# Patient Record
Sex: MALE | NOT HISPANIC OR LATINO | Employment: OTHER | URBAN - METROPOLITAN AREA
[De-identification: names, ages, dates, MRNs, and addresses within clinical notes are randomized per-mention and may not be internally consistent; named-entity substitution may affect disease eponyms.]

---

## 2017-08-16 ENCOUNTER — TRANSFERRED RECORDS (OUTPATIENT)
Dept: HEALTH INFORMATION MANAGEMENT | Facility: CLINIC | Age: 72
End: 2017-08-16
Payer: COMMERCIAL

## 2018-07-17 ENCOUNTER — RECORDS - HEALTHEAST (OUTPATIENT)
Dept: LAB | Facility: CLINIC | Age: 73
End: 2018-07-17

## 2018-07-17 LAB
ALBUMIN SERPL-MCNC: 4 G/DL (ref 3.5–5)
ALP SERPL-CCNC: 71 U/L (ref 45–120)
ALT SERPL W P-5'-P-CCNC: 36 U/L (ref 0–45)
ANION GAP SERPL CALCULATED.3IONS-SCNC: 11 MMOL/L (ref 5–18)
AST SERPL W P-5'-P-CCNC: 26 U/L (ref 0–40)
BILIRUB SERPL-MCNC: 0.8 MG/DL (ref 0–1)
BUN SERPL-MCNC: 19 MG/DL (ref 8–28)
CALCIUM SERPL-MCNC: 9.4 MG/DL (ref 8.5–10.5)
CHLORIDE BLD-SCNC: 104 MMOL/L (ref 98–107)
CHOLEST SERPL-MCNC: 222 MG/DL
CO2 SERPL-SCNC: 26 MMOL/L (ref 22–31)
CREAT SERPL-MCNC: 0.94 MG/DL (ref 0.7–1.3)
FASTING STATUS PATIENT QL REPORTED: ABNORMAL
GFR SERPL CREATININE-BSD FRML MDRD: >60 ML/MIN/1.73M2
GLUCOSE BLD-MCNC: 103 MG/DL (ref 70–125)
HDLC SERPL-MCNC: 42 MG/DL
LDLC SERPL CALC-MCNC: 137 MG/DL
POTASSIUM BLD-SCNC: 3.7 MMOL/L (ref 3.5–5)
PROT SERPL-MCNC: 6.8 G/DL (ref 6–8)
PSA SERPL-MCNC: 5.6 NG/ML (ref 0–6.5)
SODIUM SERPL-SCNC: 141 MMOL/L (ref 136–145)
TRIGL SERPL-MCNC: 216 MG/DL
TSH SERPL DL<=0.005 MIU/L-ACNC: 1.71 UIU/ML (ref 0.3–5)
VIT B12 SERPL-MCNC: 789 PG/ML (ref 213–816)

## 2018-07-18 LAB — HCV AB SERPL QL IA: NEGATIVE

## 2019-07-18 ENCOUNTER — RECORDS - HEALTHEAST (OUTPATIENT)
Dept: LAB | Facility: CLINIC | Age: 74
End: 2019-07-18

## 2019-07-18 LAB
ALBUMIN SERPL-MCNC: 4.2 G/DL (ref 3.5–5)
ALP SERPL-CCNC: 67 U/L (ref 45–120)
ALT SERPL W P-5'-P-CCNC: 27 U/L (ref 0–45)
ANION GAP SERPL CALCULATED.3IONS-SCNC: 13 MMOL/L (ref 5–18)
AST SERPL W P-5'-P-CCNC: 22 U/L (ref 0–40)
BILIRUB SERPL-MCNC: 0.6 MG/DL (ref 0–1)
BUN SERPL-MCNC: 19 MG/DL (ref 8–28)
CALCIUM SERPL-MCNC: 9.6 MG/DL (ref 8.5–10.5)
CHLORIDE BLD-SCNC: 102 MMOL/L (ref 98–107)
CHOLEST SERPL-MCNC: 238 MG/DL
CO2 SERPL-SCNC: 27 MMOL/L (ref 22–31)
CREAT SERPL-MCNC: 0.98 MG/DL (ref 0.7–1.3)
FASTING STATUS PATIENT QL REPORTED: ABNORMAL
GFR SERPL CREATININE-BSD FRML MDRD: >60 ML/MIN/1.73M2
GLUCOSE BLD-MCNC: 99 MG/DL (ref 70–125)
HDLC SERPL-MCNC: 50 MG/DL
LDLC SERPL CALC-MCNC: 135 MG/DL
POTASSIUM BLD-SCNC: 3.9 MMOL/L (ref 3.5–5)
PROT SERPL-MCNC: 6.9 G/DL (ref 6–8)
PSA SERPL-MCNC: 3.9 NG/ML (ref 0–6.5)
SODIUM SERPL-SCNC: 142 MMOL/L (ref 136–145)
TRIGL SERPL-MCNC: 266 MG/DL

## 2019-12-03 ENCOUNTER — AMBULATORY - HEALTHEAST (OUTPATIENT)
Dept: CARDIOLOGY | Facility: CLINIC | Age: 74
End: 2019-12-03

## 2019-12-03 ENCOUNTER — RECORDS - HEALTHEAST (OUTPATIENT)
Dept: ADMINISTRATIVE | Facility: OTHER | Age: 74
End: 2019-12-03

## 2019-12-04 ENCOUNTER — OFFICE VISIT - HEALTHEAST (OUTPATIENT)
Dept: CARDIOLOGY | Facility: CLINIC | Age: 74
End: 2019-12-04

## 2019-12-04 ENCOUNTER — AMBULATORY - HEALTHEAST (OUTPATIENT)
Dept: CARDIOLOGY | Facility: CLINIC | Age: 74
End: 2019-12-04

## 2019-12-04 DIAGNOSIS — I20.0 ANGINA PECTORIS, CRESCENDO (H): ICD-10-CM

## 2019-12-04 DIAGNOSIS — E78.00 PURE HYPERCHOLESTEROLEMIA: ICD-10-CM

## 2019-12-04 DIAGNOSIS — E66.812 CLASS 2 OBESITY DUE TO EXCESS CALORIES IN ADULT: ICD-10-CM

## 2019-12-04 DIAGNOSIS — I10 ESSENTIAL HYPERTENSION: ICD-10-CM

## 2019-12-04 DIAGNOSIS — E66.09 CLASS 2 OBESITY DUE TO EXCESS CALORIES IN ADULT: ICD-10-CM

## 2019-12-04 RX ORDER — BIOTIN 5 MG
1-2 TABLET ORAL DAILY
Status: SHIPPED | COMMUNITY
Start: 2019-12-04

## 2019-12-04 RX ORDER — NITROGLYCERIN 0.4 MG/1
0.4 TABLET SUBLINGUAL EVERY 5 MIN PRN
Status: SHIPPED | COMMUNITY
Start: 2019-12-04 | End: 2022-05-03

## 2019-12-04 RX ORDER — HYDROCHLOROTHIAZIDE 25 MG/1
25 TABLET ORAL DAILY
Status: SHIPPED | COMMUNITY
Start: 2019-12-04

## 2019-12-04 ASSESSMENT — MIFFLIN-ST. JEOR: SCORE: 1962.35

## 2019-12-05 ENCOUNTER — SURGERY - HEALTHEAST (OUTPATIENT)
Dept: CARDIOLOGY | Facility: CLINIC | Age: 74
End: 2019-12-05

## 2019-12-06 ENCOUNTER — SURGERY - HEALTHEAST (OUTPATIENT)
Dept: CARDIOLOGY | Facility: CLINIC | Age: 74
End: 2019-12-06

## 2019-12-06 ASSESSMENT — MIFFLIN-ST. JEOR: SCORE: 1952.83

## 2019-12-12 ENCOUNTER — COMMUNICATION - HEALTHEAST (OUTPATIENT)
Dept: CARDIOLOGY | Facility: CLINIC | Age: 74
End: 2019-12-12

## 2019-12-19 ENCOUNTER — OFFICE VISIT - HEALTHEAST (OUTPATIENT)
Dept: CARDIOLOGY | Facility: CLINIC | Age: 74
End: 2019-12-19

## 2019-12-19 DIAGNOSIS — I10 ESSENTIAL HYPERTENSION: ICD-10-CM

## 2019-12-19 DIAGNOSIS — E78.00 PURE HYPERCHOLESTEROLEMIA: ICD-10-CM

## 2019-12-19 DIAGNOSIS — I25.10 CORONARY ARTERY DISEASE INVOLVING NATIVE HEART WITHOUT ANGINA PECTORIS, UNSPECIFIED VESSEL OR LESION TYPE: ICD-10-CM

## 2019-12-19 ASSESSMENT — MIFFLIN-ST. JEOR: SCORE: 1975.96

## 2019-12-20 ENCOUNTER — AMBULATORY - HEALTHEAST (OUTPATIENT)
Dept: CARDIAC REHAB | Facility: HOSPITAL | Age: 74
End: 2019-12-20

## 2019-12-20 DIAGNOSIS — I20.0 ANGINA PECTORIS, CRESCENDO (H): ICD-10-CM

## 2019-12-20 DIAGNOSIS — Z95.5 STENTED CORONARY ARTERY: ICD-10-CM

## 2019-12-23 ENCOUNTER — AMBULATORY - HEALTHEAST (OUTPATIENT)
Dept: CARDIAC REHAB | Facility: HOSPITAL | Age: 74
End: 2019-12-23

## 2019-12-23 DIAGNOSIS — Z95.5 STENTED CORONARY ARTERY: ICD-10-CM

## 2019-12-30 ENCOUNTER — AMBULATORY - HEALTHEAST (OUTPATIENT)
Dept: CARDIAC REHAB | Facility: HOSPITAL | Age: 74
End: 2019-12-30

## 2019-12-30 DIAGNOSIS — Z95.5 STENTED CORONARY ARTERY: ICD-10-CM

## 2020-01-06 ENCOUNTER — AMBULATORY - HEALTHEAST (OUTPATIENT)
Dept: CARDIAC REHAB | Facility: HOSPITAL | Age: 75
End: 2020-01-06

## 2020-01-06 DIAGNOSIS — Z95.5 STENTED CORONARY ARTERY: ICD-10-CM

## 2020-01-13 ENCOUNTER — AMBULATORY - HEALTHEAST (OUTPATIENT)
Dept: CARDIAC REHAB | Facility: HOSPITAL | Age: 75
End: 2020-01-13

## 2020-01-13 DIAGNOSIS — Z95.5 STENTED CORONARY ARTERY: ICD-10-CM

## 2020-01-15 ENCOUNTER — AMBULATORY - HEALTHEAST (OUTPATIENT)
Dept: CARDIAC REHAB | Facility: HOSPITAL | Age: 75
End: 2020-01-15

## 2020-01-15 DIAGNOSIS — Z95.5 STENTED CORONARY ARTERY: ICD-10-CM

## 2020-01-20 ENCOUNTER — AMBULATORY - HEALTHEAST (OUTPATIENT)
Dept: CARDIAC REHAB | Facility: HOSPITAL | Age: 75
End: 2020-01-20

## 2020-01-20 DIAGNOSIS — Z95.5 STENTED CORONARY ARTERY: ICD-10-CM

## 2020-01-22 ENCOUNTER — AMBULATORY - HEALTHEAST (OUTPATIENT)
Dept: CARDIAC REHAB | Facility: HOSPITAL | Age: 75
End: 2020-01-22

## 2020-01-22 DIAGNOSIS — Z95.5 STENTED CORONARY ARTERY: ICD-10-CM

## 2020-01-27 ENCOUNTER — COMMUNICATION - HEALTHEAST (OUTPATIENT)
Dept: CARDIOLOGY | Facility: CLINIC | Age: 75
End: 2020-01-27

## 2020-01-27 ENCOUNTER — OFFICE VISIT - HEALTHEAST (OUTPATIENT)
Dept: CARDIOLOGY | Facility: CLINIC | Age: 75
End: 2020-01-27

## 2020-01-27 DIAGNOSIS — E66.812 CLASS 2 OBESITY DUE TO EXCESS CALORIES IN ADULT: ICD-10-CM

## 2020-01-27 DIAGNOSIS — E78.00 PURE HYPERCHOLESTEROLEMIA: ICD-10-CM

## 2020-01-27 DIAGNOSIS — I10 ESSENTIAL HYPERTENSION: ICD-10-CM

## 2020-01-27 DIAGNOSIS — E66.09 CLASS 2 OBESITY DUE TO EXCESS CALORIES IN ADULT: ICD-10-CM

## 2020-01-27 DIAGNOSIS — I25.10 CORONARY ARTERY DISEASE INVOLVING NATIVE HEART WITHOUT ANGINA PECTORIS, UNSPECIFIED VESSEL OR LESION TYPE: ICD-10-CM

## 2020-01-27 ASSESSMENT — MIFFLIN-ST. JEOR: SCORE: 1971.43

## 2020-01-29 ENCOUNTER — AMBULATORY - HEALTHEAST (OUTPATIENT)
Dept: CARDIAC REHAB | Facility: HOSPITAL | Age: 75
End: 2020-01-29

## 2020-01-29 DIAGNOSIS — Z95.5 STENTED CORONARY ARTERY: ICD-10-CM

## 2020-02-03 ENCOUNTER — AMBULATORY - HEALTHEAST (OUTPATIENT)
Dept: CARDIAC REHAB | Facility: HOSPITAL | Age: 75
End: 2020-02-03

## 2020-02-03 DIAGNOSIS — Z95.5 STENTED CORONARY ARTERY: ICD-10-CM

## 2020-02-10 ENCOUNTER — HOSPITAL ENCOUNTER (OUTPATIENT)
Dept: CARDIOLOGY | Facility: HOSPITAL | Age: 75
Discharge: HOME | End: 2020-02-10
Attending: INTERNAL MEDICINE

## 2020-02-10 ENCOUNTER — AMBULATORY - HEALTHEAST (OUTPATIENT)
Dept: LAB | Facility: HOSPITAL | Age: 75
End: 2020-02-10

## 2020-02-10 ENCOUNTER — HOSPITAL ENCOUNTER (OUTPATIENT)
Dept: NUCLEAR MEDICINE | Facility: HOSPITAL | Age: 75
Discharge: HOME | End: 2020-02-10
Attending: INTERNAL MEDICINE

## 2020-02-10 DIAGNOSIS — E78.00 PURE HYPERCHOLESTEROLEMIA: ICD-10-CM

## 2020-02-10 DIAGNOSIS — I25.10 CORONARY ARTERY DISEASE INVOLVING NATIVE HEART WITHOUT ANGINA PECTORIS, UNSPECIFIED VESSEL OR LESION TYPE: ICD-10-CM

## 2020-02-10 LAB
CHOLEST SERPL-MCNC: 204 MG/DL
CV STRESS CURRENT BP HE: NORMAL
CV STRESS CURRENT HR HE: 100
CV STRESS CURRENT HR HE: 111
CV STRESS CURRENT HR HE: 112
CV STRESS CURRENT HR HE: 115
CV STRESS CURRENT HR HE: 117
CV STRESS CURRENT HR HE: 121
CV STRESS CURRENT HR HE: 121
CV STRESS CURRENT HR HE: 123
CV STRESS CURRENT HR HE: 126
CV STRESS CURRENT HR HE: 126
CV STRESS CURRENT HR HE: 73
CV STRESS CURRENT HR HE: 75
CV STRESS CURRENT HR HE: 75
CV STRESS CURRENT HR HE: 77
CV STRESS CURRENT HR HE: 77
CV STRESS CURRENT HR HE: 80
CV STRESS CURRENT HR HE: 81
CV STRESS CURRENT HR HE: 82
CV STRESS CURRENT HR HE: 83
CV STRESS CURRENT HR HE: 86
CV STRESS CURRENT HR HE: 86
CV STRESS DEVIATION TIME HE: NORMAL
CV STRESS ECHO PERCENT HR HE: NORMAL
CV STRESS EXERCISE STAGE HE: NORMAL
CV STRESS EXERCISE STAGE REACHED HE: NORMAL
CV STRESS FINAL RESTING BP HE: NORMAL
CV STRESS FINAL RESTING HR HE: 83
CV STRESS MAX HR HE: 126
CV STRESS MAX TREADMILL GRADE HE: 12
CV STRESS MAX TREADMILL SPEED HE: 2.5
CV STRESS PEAK DIA BP HE: NORMAL
CV STRESS PEAK SYS BP HE: NORMAL
CV STRESS PHASE HE: NORMAL
CV STRESS PROTOCOL HE: NORMAL
CV STRESS RESTING PT POSITION HE: NORMAL
CV STRESS RESTING PT POSITION HE: NORMAL
CV STRESS ST DEVIATION AMOUNT HE: NORMAL
CV STRESS ST DEVIATION ELEVATION HE: NORMAL
CV STRESS ST EVELATION AMOUNT HE: NORMAL
CV STRESS TEST TYPE HE: NORMAL
CV STRESS TOTAL STAGE TIME MIN 1 HE: NORMAL
FASTING STATUS PATIENT QL REPORTED: YES
HDLC SERPL-MCNC: 52 MG/DL
LDLC SERPL CALC-MCNC: 124 MG/DL
NUC STRESS EJECTION FRACTION: 52 %
RATE PRESSURE PRODUCT: NORMAL
STRESS ECHO BASELINE DIASTOLIC HE: 84
STRESS ECHO BASELINE HR: 74
STRESS ECHO BASELINE SYSTOLIC BP: 136
STRESS ECHO CALCULATED PERCENT HR: 85 %
STRESS ECHO LAST STRESS DIASTOLIC BP: 82
STRESS ECHO LAST STRESS HR: 126
STRESS ECHO LAST STRESS SYSTOLIC BP: 168
STRESS ECHO POST ESTIMATED WORKLOAD: 5.8
STRESS ECHO POST EXERCISE DUR MIN: 4
STRESS ECHO POST EXERCISE DUR SEC: 0
STRESS ECHO TARGET HR: 148
TRIGL SERPL-MCNC: 140 MG/DL

## 2020-02-11 ENCOUNTER — COMMUNICATION - HEALTHEAST (OUTPATIENT)
Dept: CARDIOLOGY | Facility: CLINIC | Age: 75
End: 2020-02-11

## 2020-02-12 ENCOUNTER — AMBULATORY - HEALTHEAST (OUTPATIENT)
Dept: CARDIAC REHAB | Facility: HOSPITAL | Age: 75
End: 2020-02-12

## 2020-02-12 ENCOUNTER — COMMUNICATION - HEALTHEAST (OUTPATIENT)
Dept: CARDIOLOGY | Facility: CLINIC | Age: 75
End: 2020-02-12

## 2020-02-12 DIAGNOSIS — Z95.5 STENTED CORONARY ARTERY: ICD-10-CM

## 2020-02-12 DIAGNOSIS — I20.0 ANGINA PECTORIS, CRESCENDO (H): ICD-10-CM

## 2020-02-17 ENCOUNTER — AMBULATORY - HEALTHEAST (OUTPATIENT)
Dept: CARDIAC REHAB | Facility: HOSPITAL | Age: 75
End: 2020-02-17

## 2020-02-17 DIAGNOSIS — Z95.5 STENTED CORONARY ARTERY: ICD-10-CM

## 2020-02-19 ENCOUNTER — AMBULATORY - HEALTHEAST (OUTPATIENT)
Dept: CARDIAC REHAB | Facility: HOSPITAL | Age: 75
End: 2020-02-19

## 2020-02-19 DIAGNOSIS — Z95.5 STENTED CORONARY ARTERY: ICD-10-CM

## 2020-03-02 ENCOUNTER — AMBULATORY - HEALTHEAST (OUTPATIENT)
Dept: CARDIAC REHAB | Facility: HOSPITAL | Age: 75
End: 2020-03-02

## 2020-03-02 DIAGNOSIS — Z95.5 STENTED CORONARY ARTERY: ICD-10-CM

## 2020-03-11 ENCOUNTER — AMBULATORY - HEALTHEAST (OUTPATIENT)
Dept: CARDIAC REHAB | Facility: HOSPITAL | Age: 75
End: 2020-03-11

## 2020-03-11 DIAGNOSIS — Z95.5 STENTED CORONARY ARTERY: ICD-10-CM

## 2020-03-16 ENCOUNTER — AMBULATORY - HEALTHEAST (OUTPATIENT)
Dept: CARDIAC REHAB | Facility: HOSPITAL | Age: 75
End: 2020-03-16

## 2020-03-16 DIAGNOSIS — Z95.5 STENTED CORONARY ARTERY: ICD-10-CM

## 2020-05-05 ENCOUNTER — AMBULATORY - HEALTHEAST (OUTPATIENT)
Dept: CARDIAC REHAB | Facility: HOSPITAL | Age: 75
End: 2020-05-05

## 2020-05-05 DIAGNOSIS — Z95.5 STENTED CORONARY ARTERY: ICD-10-CM

## 2020-05-07 ENCOUNTER — AMBULATORY - HEALTHEAST (OUTPATIENT)
Dept: CARDIAC REHAB | Facility: HOSPITAL | Age: 75
End: 2020-05-07

## 2020-05-07 DIAGNOSIS — Z95.5 STENTED CORONARY ARTERY: ICD-10-CM

## 2020-05-12 ENCOUNTER — AMBULATORY - HEALTHEAST (OUTPATIENT)
Dept: CARDIAC REHAB | Facility: HOSPITAL | Age: 75
End: 2020-05-12

## 2020-05-12 DIAGNOSIS — Z95.5 STENTED CORONARY ARTERY: ICD-10-CM

## 2020-05-14 ENCOUNTER — AMBULATORY - HEALTHEAST (OUTPATIENT)
Dept: CARDIAC REHAB | Facility: HOSPITAL | Age: 75
End: 2020-05-14

## 2020-05-14 DIAGNOSIS — Z95.5 STENTED CORONARY ARTERY: ICD-10-CM

## 2020-05-19 ENCOUNTER — AMBULATORY - HEALTHEAST (OUTPATIENT)
Dept: CARDIAC REHAB | Facility: HOSPITAL | Age: 75
End: 2020-05-19

## 2020-05-19 DIAGNOSIS — Z95.5 STENTED CORONARY ARTERY: ICD-10-CM

## 2020-05-22 ENCOUNTER — COMMUNICATION - HEALTHEAST (OUTPATIENT)
Dept: EMERGENCY MEDICINE | Facility: HOSPITAL | Age: 75
End: 2020-05-22

## 2020-06-09 ENCOUNTER — AMBULATORY - HEALTHEAST (OUTPATIENT)
Dept: CARDIAC REHAB | Facility: HOSPITAL | Age: 75
End: 2020-06-09

## 2020-06-09 DIAGNOSIS — Z95.5 STENTED CORONARY ARTERY: ICD-10-CM

## 2020-06-11 ENCOUNTER — AMBULATORY - HEALTHEAST (OUTPATIENT)
Dept: CARDIAC REHAB | Facility: HOSPITAL | Age: 75
End: 2020-06-11

## 2020-06-11 DIAGNOSIS — Z95.5 STENTED CORONARY ARTERY: ICD-10-CM

## 2020-06-16 ENCOUNTER — AMBULATORY - HEALTHEAST (OUTPATIENT)
Dept: CARDIAC REHAB | Facility: HOSPITAL | Age: 75
End: 2020-06-16

## 2020-06-16 DIAGNOSIS — Z95.5 STENTED CORONARY ARTERY: ICD-10-CM

## 2020-06-18 ENCOUNTER — AMBULATORY - HEALTHEAST (OUTPATIENT)
Dept: CARDIAC REHAB | Facility: HOSPITAL | Age: 75
End: 2020-06-18

## 2020-06-18 DIAGNOSIS — Z95.5 STENTED CORONARY ARTERY: ICD-10-CM

## 2020-06-19 ENCOUNTER — COMMUNICATION - HEALTHEAST (OUTPATIENT)
Dept: CARDIOLOGY | Facility: CLINIC | Age: 75
End: 2020-06-19

## 2020-06-19 DIAGNOSIS — I10 ESSENTIAL HYPERTENSION: ICD-10-CM

## 2020-06-23 ENCOUNTER — AMBULATORY - HEALTHEAST (OUTPATIENT)
Dept: CARDIAC REHAB | Facility: HOSPITAL | Age: 75
End: 2020-06-23

## 2020-06-23 DIAGNOSIS — Z95.5 STENTED CORONARY ARTERY: ICD-10-CM

## 2020-06-25 ENCOUNTER — AMBULATORY - HEALTHEAST (OUTPATIENT)
Dept: CARDIAC REHAB | Facility: HOSPITAL | Age: 75
End: 2020-06-25

## 2020-06-25 DIAGNOSIS — Z95.5 STENTED CORONARY ARTERY: ICD-10-CM

## 2020-06-30 ENCOUNTER — AMBULATORY - HEALTHEAST (OUTPATIENT)
Dept: CARDIAC REHAB | Facility: HOSPITAL | Age: 75
End: 2020-06-30

## 2020-06-30 DIAGNOSIS — Z95.5 STENTED CORONARY ARTERY: ICD-10-CM

## 2020-07-02 ENCOUNTER — AMBULATORY - HEALTHEAST (OUTPATIENT)
Dept: CARDIAC REHAB | Facility: HOSPITAL | Age: 75
End: 2020-07-02

## 2020-07-02 DIAGNOSIS — Z95.5 STENTED CORONARY ARTERY: ICD-10-CM

## 2020-07-07 ENCOUNTER — AMBULATORY - HEALTHEAST (OUTPATIENT)
Dept: CARDIAC REHAB | Facility: HOSPITAL | Age: 75
End: 2020-07-07

## 2020-07-07 ENCOUNTER — AMBULATORY - HEALTHEAST (OUTPATIENT)
Dept: CARDIOLOGY | Facility: CLINIC | Age: 75
End: 2020-07-07

## 2020-07-07 ENCOUNTER — RECORDS - HEALTHEAST (OUTPATIENT)
Dept: ADMINISTRATIVE | Facility: OTHER | Age: 75
End: 2020-07-07

## 2020-07-07 DIAGNOSIS — Z95.5 STENTED CORONARY ARTERY: ICD-10-CM

## 2020-07-09 ENCOUNTER — COMMUNICATION - HEALTHEAST (OUTPATIENT)
Dept: CARDIOLOGY | Facility: CLINIC | Age: 75
End: 2020-07-09

## 2020-07-09 ENCOUNTER — AMBULATORY - HEALTHEAST (OUTPATIENT)
Dept: CARDIAC REHAB | Facility: HOSPITAL | Age: 75
End: 2020-07-09

## 2020-07-09 DIAGNOSIS — Z95.5 STENTED CORONARY ARTERY: ICD-10-CM

## 2020-07-10 ENCOUNTER — OFFICE VISIT - HEALTHEAST (OUTPATIENT)
Dept: CARDIOLOGY | Facility: CLINIC | Age: 75
End: 2020-07-10

## 2020-07-10 DIAGNOSIS — I10 ESSENTIAL HYPERTENSION: ICD-10-CM

## 2020-07-10 DIAGNOSIS — I47.29 PAROXYSMAL VT (H): ICD-10-CM

## 2020-07-10 DIAGNOSIS — E66.812 CLASS 2 OBESITY DUE TO EXCESS CALORIES IN ADULT: ICD-10-CM

## 2020-07-10 DIAGNOSIS — I25.10 CORONARY ARTERY DISEASE INVOLVING NATIVE HEART WITHOUT ANGINA PECTORIS, UNSPECIFIED VESSEL OR LESION TYPE: ICD-10-CM

## 2020-07-10 DIAGNOSIS — E66.09 CLASS 2 OBESITY DUE TO EXCESS CALORIES IN ADULT: ICD-10-CM

## 2020-07-10 DIAGNOSIS — E78.00 PURE HYPERCHOLESTEROLEMIA: ICD-10-CM

## 2020-07-10 DIAGNOSIS — M54.40 BACK PAIN OF LUMBAR REGION WITH SCIATICA: ICD-10-CM

## 2020-07-10 LAB
CHOLEST SERPL-MCNC: 209 MG/DL
FASTING STATUS PATIENT QL REPORTED: YES
HDLC SERPL-MCNC: 45 MG/DL
LDLC SERPL CALC-MCNC: 112 MG/DL
TRIGL SERPL-MCNC: 258 MG/DL

## 2020-07-10 ASSESSMENT — MIFFLIN-ST. JEOR: SCORE: 1982.31

## 2020-07-13 ENCOUNTER — AMBULATORY - HEALTHEAST (OUTPATIENT)
Dept: CARDIOLOGY | Facility: CLINIC | Age: 75
End: 2020-07-13

## 2020-07-13 DIAGNOSIS — E78.00 PURE HYPERCHOLESTEROLEMIA: ICD-10-CM

## 2020-07-14 ENCOUNTER — AMBULATORY - HEALTHEAST (OUTPATIENT)
Dept: CARDIAC REHAB | Facility: HOSPITAL | Age: 75
End: 2020-07-14

## 2020-07-14 DIAGNOSIS — Z95.5 STENTED CORONARY ARTERY: ICD-10-CM

## 2020-07-16 ENCOUNTER — AMBULATORY - HEALTHEAST (OUTPATIENT)
Dept: CARDIAC REHAB | Facility: HOSPITAL | Age: 75
End: 2020-07-16

## 2020-07-16 DIAGNOSIS — Z95.5 STENTED CORONARY ARTERY: ICD-10-CM

## 2020-08-11 ENCOUNTER — RECORDS - HEALTHEAST (OUTPATIENT)
Dept: LAB | Facility: CLINIC | Age: 75
End: 2020-08-11

## 2020-08-11 LAB — PSA SERPL-MCNC: 2.2 NG/ML (ref 0–6.5)

## 2020-12-30 ENCOUNTER — COMMUNICATION - HEALTHEAST (OUTPATIENT)
Dept: CARDIOLOGY | Facility: CLINIC | Age: 75
End: 2020-12-30

## 2020-12-30 DIAGNOSIS — I20.0 ANGINA PECTORIS, CRESCENDO (H): ICD-10-CM

## 2021-01-14 ENCOUNTER — COMMUNICATION - HEALTHEAST (OUTPATIENT)
Dept: CARDIOLOGY | Facility: CLINIC | Age: 76
End: 2021-01-14

## 2021-01-14 DIAGNOSIS — I20.0 ANGINA PECTORIS, CRESCENDO (H): ICD-10-CM

## 2021-01-28 ENCOUNTER — COMMUNICATION - HEALTHEAST (OUTPATIENT)
Dept: CARDIOLOGY | Facility: CLINIC | Age: 76
End: 2021-01-28

## 2021-01-29 ENCOUNTER — AMBULATORY - HEALTHEAST (OUTPATIENT)
Dept: CARDIOLOGY | Facility: CLINIC | Age: 76
End: 2021-01-29

## 2021-01-29 DIAGNOSIS — E78.00 PURE HYPERCHOLESTEROLEMIA: ICD-10-CM

## 2021-01-29 LAB
CHOLEST SERPL-MCNC: 164 MG/DL
FASTING STATUS PATIENT QL REPORTED: YES
HDLC SERPL-MCNC: 38 MG/DL
LDLC SERPL CALC-MCNC: 78 MG/DL
TRIGL SERPL-MCNC: 241 MG/DL

## 2021-02-02 ENCOUNTER — RECORDS - HEALTHEAST (OUTPATIENT)
Dept: ADMINISTRATIVE | Facility: OTHER | Age: 76
End: 2021-02-02

## 2021-02-04 ENCOUNTER — COMMUNICATION - HEALTHEAST (OUTPATIENT)
Dept: CARDIOLOGY | Facility: CLINIC | Age: 76
End: 2021-02-04

## 2021-02-05 ENCOUNTER — OFFICE VISIT - HEALTHEAST (OUTPATIENT)
Dept: CARDIOLOGY | Facility: CLINIC | Age: 76
End: 2021-02-05

## 2021-02-05 DIAGNOSIS — M54.40 BACK PAIN OF LUMBAR REGION WITH SCIATICA: ICD-10-CM

## 2021-02-05 DIAGNOSIS — E66.812 CLASS 2 OBESITY DUE TO EXCESS CALORIES IN ADULT: ICD-10-CM

## 2021-02-05 DIAGNOSIS — I10 ESSENTIAL HYPERTENSION: ICD-10-CM

## 2021-02-05 DIAGNOSIS — E66.09 CLASS 2 OBESITY DUE TO EXCESS CALORIES IN ADULT: ICD-10-CM

## 2021-02-05 DIAGNOSIS — I25.10 CORONARY ARTERY DISEASE INVOLVING NATIVE HEART WITHOUT ANGINA PECTORIS, UNSPECIFIED VESSEL OR LESION TYPE: ICD-10-CM

## 2021-02-05 DIAGNOSIS — E78.00 PURE HYPERCHOLESTEROLEMIA: ICD-10-CM

## 2021-02-05 RX ORDER — CELECOXIB 200 MG/1
1 CAPSULE ORAL DAILY
Status: ON HOLD | COMMUNITY
Start: 2021-01-06 | End: 2021-08-05

## 2021-02-05 RX ORDER — VENLAFAXINE HYDROCHLORIDE 37.5 MG/1
1 CAPSULE, EXTENDED RELEASE ORAL DAILY PRN
Status: SHIPPED | COMMUNITY
Start: 2021-01-06 | End: 2021-08-03

## 2021-02-05 ASSESSMENT — MIFFLIN-ST. JEOR: SCORE: 2012.25

## 2021-02-08 ENCOUNTER — COMMUNICATION - HEALTHEAST (OUTPATIENT)
Dept: CARDIOLOGY | Facility: CLINIC | Age: 76
End: 2021-02-08

## 2021-02-08 DIAGNOSIS — R06.02 SHORTNESS OF BREATH: ICD-10-CM

## 2021-02-18 ENCOUNTER — OFFICE VISIT - HEALTHEAST (OUTPATIENT)
Dept: CARDIOLOGY | Facility: CLINIC | Age: 76
End: 2021-02-18

## 2021-02-18 DIAGNOSIS — I47.29 PAROXYSMAL VT (H): ICD-10-CM

## 2021-02-23 ENCOUNTER — COMMUNICATION - HEALTHEAST (OUTPATIENT)
Dept: CARDIOLOGY | Facility: CLINIC | Age: 76
End: 2021-02-23

## 2021-02-25 ENCOUNTER — OFFICE VISIT - HEALTHEAST (OUTPATIENT)
Dept: CARDIOLOGY | Facility: CLINIC | Age: 76
End: 2021-02-25

## 2021-02-25 DIAGNOSIS — I47.29 PAROXYSMAL VT (H): ICD-10-CM

## 2021-03-02 ENCOUNTER — OFFICE VISIT - HEALTHEAST (OUTPATIENT)
Dept: CARDIOLOGY | Facility: CLINIC | Age: 76
End: 2021-03-02

## 2021-03-02 DIAGNOSIS — I47.29 PAROXYSMAL VT (H): ICD-10-CM

## 2021-03-09 ENCOUNTER — COMMUNICATION - HEALTHEAST (OUTPATIENT)
Dept: CARDIOLOGY | Facility: CLINIC | Age: 76
End: 2021-03-09

## 2021-03-09 DIAGNOSIS — E78.00 PURE HYPERCHOLESTEROLEMIA: ICD-10-CM

## 2021-03-09 DIAGNOSIS — I20.0 ANGINA PECTORIS, CRESCENDO (H): ICD-10-CM

## 2021-03-10 ENCOUNTER — OFFICE VISIT - HEALTHEAST (OUTPATIENT)
Dept: CARDIOLOGY | Facility: CLINIC | Age: 76
End: 2021-03-10

## 2021-03-10 DIAGNOSIS — I47.29 PAROXYSMAL VT (H): ICD-10-CM

## 2021-03-10 RX ORDER — ATORVASTATIN CALCIUM 80 MG/1
TABLET, FILM COATED ORAL
Qty: 90 TABLET | Refills: 3 | Status: SHIPPED | OUTPATIENT
Start: 2021-03-10 | End: 2021-07-22

## 2021-03-10 RX ORDER — EZETIMIBE 10 MG/1
TABLET ORAL
Qty: 90 TABLET | Refills: 3 | Status: SHIPPED | OUTPATIENT
Start: 2021-03-10 | End: 2022-03-02

## 2021-03-23 ENCOUNTER — TRANSFERRED RECORDS (OUTPATIENT)
Dept: HEALTH INFORMATION MANAGEMENT | Facility: CLINIC | Age: 76
End: 2021-03-23

## 2021-04-08 ENCOUNTER — HOSPITAL ENCOUNTER (OUTPATIENT)
Dept: CARDIOLOGY | Facility: HOSPITAL | Age: 76
Discharge: HOME | End: 2021-04-08
Attending: INTERNAL MEDICINE

## 2021-04-08 DIAGNOSIS — R06.02 SHORTNESS OF BREATH: ICD-10-CM

## 2021-04-08 LAB
AORTIC ROOT: 3.7 CM
AORTIC VALVE MEAN VELOCITY: 119 CM/S
ASCENDING AORTA: 4 CM
AV DIMENSIONLESS INDEX VTI: 0.6
AV MEAN GRADIENT: 6 MMHG
AV PEAK GRADIENT: 9.2 MMHG
AV VALVE AREA: 3 CM2
AV VELOCITY RATIO: 0.6
BSA FOR ECHO PROCEDURE: 2.49 M2
CV BLOOD PRESSURE: ABNORMAL MMHG
CV ECHO HEIGHT: 70 IN
CV ECHO WEIGHT: 278 LBS
DOP CALC AO PEAK VEL: 152 CM/S
DOP CALC AO VTI: 32.6 CM
DOP CALC LVOT AREA: 4.91 CM2
DOP CALC LVOT DIAMETER: 2.5 CM
DOP CALC LVOT PEAK VEL: 92.5 CM/S
DOP CALC LVOT STROKE VOLUME: 97.6 CM3
DOP CALCLVOT PEAK VEL VTI: 19.9 CM
ECHO EJECTION FRACTION ESTIMATED: 45 %
EJECTION FRACTION: 40 % (ref 55–75)
FRACTIONAL SHORTENING: 28.6 % (ref 28–44)
INTERVENTRICULAR SEPTUM IN END DIASTOLE: 1.1 CM (ref 0.6–1)
IVS/PW RATIO: 1.1
LA AREA 1: 22.2 CM2
LA AREA 2: 31.5 CM2
LEFT ATRIUM LENGTH: 6.43 CM
LEFT ATRIUM SIZE: 4.8 CM
LEFT ATRIUM VOLUME INDEX: 37.1 ML/M2
LEFT ATRIUM VOLUME: 92.4 ML
LEFT VENTRICLE CARDIAC INDEX: 2.6 L/MIN/M2
LEFT VENTRICLE CARDIAC OUTPUT: 6.5 L/MIN
LEFT VENTRICLE DIASTOLIC VOLUME INDEX: 34.5 CM3/M2 (ref 34–74)
LEFT VENTRICLE DIASTOLIC VOLUME: 86 CM3 (ref 62–150)
LEFT VENTRICLE HEART RATE: 67 BPM
LEFT VENTRICLE MASS INDEX: 114.7 G/M2
LEFT VENTRICLE SYSTOLIC VOLUME INDEX: 20.9 CM3/M2 (ref 11–31)
LEFT VENTRICLE SYSTOLIC VOLUME: 52 CM3 (ref 21–61)
LEFT VENTRICULAR INTERNAL DIMENSION IN DIASTOLE: 6.3 CM (ref 4.2–5.8)
LEFT VENTRICULAR INTERNAL DIMENSION IN SYSTOLE: 4.5 CM (ref 2.5–4)
LEFT VENTRICULAR MASS: 285.7 G
LEFT VENTRICULAR OUTFLOW TRACT MEAN GRADIENT: 2 MMHG
LEFT VENTRICULAR OUTFLOW TRACT MEAN VELOCITY: 70 CM/S
LEFT VENTRICULAR OUTFLOW TRACT PEAK GRADIENT: 3 MMHG
LEFT VENTRICULAR POSTERIOR WALL IN END DIASTOLE: 1 CM (ref 0.6–1)
LV STROKE VOLUME INDEX: 39.2 ML/M2
MITRAL VALVE E/A RATIO: 0.6
MV AVERAGE E/E' RATIO: 9.3 CM/S
MV DECELERATION TIME: 275 MS
MV E'TISSUE VEL-LAT: 4.19 CM/S
MV E'TISSUE VEL-MED: 6.24 CM/S
MV LATERAL E/E' RATIO: 11.6
MV MEDIAL E/E' RATIO: 7.8
MV PEAK A VELOCITY: 86.4 CM/S
MV PEAK E VELOCITY: 48.4 CM/S
NUC REST DIASTOLIC VOLUME INDEX: 4448 LBS
NUC REST SYSTOLIC VOLUME INDEX: 70 IN
TRICUSPID VALVE ANULAR PLANE SYSTOLIC EXCURSION: 1.9 CM

## 2021-04-08 ASSESSMENT — MIFFLIN-ST. JEOR: SCORE: 2012.25

## 2021-04-09 ENCOUNTER — AMBULATORY - HEALTHEAST (OUTPATIENT)
Dept: CARDIOLOGY | Facility: CLINIC | Age: 76
End: 2021-04-09

## 2021-04-09 ENCOUNTER — COMMUNICATION - HEALTHEAST (OUTPATIENT)
Dept: CARDIOLOGY | Facility: CLINIC | Age: 76
End: 2021-04-09

## 2021-04-09 DIAGNOSIS — I10 ESSENTIAL HYPERTENSION: ICD-10-CM

## 2021-05-03 RX ORDER — CARVEDILOL 3.12 MG/1
9.25 TABLET ORAL 2 TIMES DAILY WITH MEALS
Qty: 540 TABLET | Refills: 1 | Status: SHIPPED | OUTPATIENT
Start: 2021-05-03 | End: 2021-10-18

## 2021-06-01 ENCOUNTER — RECORDS - HEALTHEAST (OUTPATIENT)
Dept: ADMINISTRATIVE | Facility: CLINIC | Age: 76
End: 2021-06-01

## 2021-06-04 VITALS — BODY MASS INDEX: 38.6 KG/M2 | WEIGHT: 269 LBS

## 2021-06-04 VITALS — HEIGHT: 70 IN | WEIGHT: 264.9 LBS | BODY MASS INDEX: 37.92 KG/M2

## 2021-06-04 VITALS — WEIGHT: 267 LBS | BODY MASS INDEX: 38.31 KG/M2

## 2021-06-04 VITALS — WEIGHT: 271 LBS | BODY MASS INDEX: 38.88 KG/M2

## 2021-06-04 VITALS
WEIGHT: 271.4 LBS | DIASTOLIC BLOOD PRESSURE: 70 MMHG | HEIGHT: 70 IN | BODY MASS INDEX: 38.85 KG/M2 | RESPIRATION RATE: 16 BRPM | HEART RATE: 64 BPM | SYSTOLIC BLOOD PRESSURE: 130 MMHG

## 2021-06-04 VITALS
BODY MASS INDEX: 38.51 KG/M2 | SYSTOLIC BLOOD PRESSURE: 120 MMHG | DIASTOLIC BLOOD PRESSURE: 84 MMHG | RESPIRATION RATE: 16 BRPM | HEIGHT: 70 IN | WEIGHT: 269 LBS | HEART RATE: 69 BPM

## 2021-06-04 VITALS
WEIGHT: 270 LBS | SYSTOLIC BLOOD PRESSURE: 128 MMHG | RESPIRATION RATE: 16 BRPM | BODY MASS INDEX: 38.65 KG/M2 | HEART RATE: 68 BPM | HEIGHT: 70 IN | DIASTOLIC BLOOD PRESSURE: 84 MMHG

## 2021-06-04 VITALS
WEIGHT: 267 LBS | HEART RATE: 59 BPM | BODY MASS INDEX: 38.22 KG/M2 | SYSTOLIC BLOOD PRESSURE: 130 MMHG | RESPIRATION RATE: 12 BRPM | HEIGHT: 70 IN | DIASTOLIC BLOOD PRESSURE: 82 MMHG

## 2021-06-04 VITALS — BODY MASS INDEX: 38.45 KG/M2 | WEIGHT: 268 LBS

## 2021-06-04 VITALS — WEIGHT: 268 LBS | BODY MASS INDEX: 38.45 KG/M2

## 2021-06-04 VITALS — BODY MASS INDEX: 38.31 KG/M2 | WEIGHT: 267 LBS

## 2021-06-04 VITALS — BODY MASS INDEX: 38.74 KG/M2 | WEIGHT: 270 LBS

## 2021-06-04 VITALS — WEIGHT: 265 LBS | BODY MASS INDEX: 38.02 KG/M2

## 2021-06-04 VITALS — BODY MASS INDEX: 37.74 KG/M2 | WEIGHT: 263 LBS

## 2021-06-04 VITALS — WEIGHT: 269 LBS | BODY MASS INDEX: 38.6 KG/M2

## 2021-06-04 VITALS — WEIGHT: 266 LBS | BODY MASS INDEX: 38.17 KG/M2

## 2021-06-04 VITALS — BODY MASS INDEX: 37.59 KG/M2 | WEIGHT: 262 LBS

## 2021-06-04 VITALS — BODY MASS INDEX: 38.88 KG/M2 | WEIGHT: 271 LBS

## 2021-06-04 VITALS — WEIGHT: 270 LBS | BODY MASS INDEX: 38.74 KG/M2

## 2021-06-04 VITALS — BODY MASS INDEX: 38.17 KG/M2 | WEIGHT: 266 LBS

## 2021-06-04 NOTE — PROGRESS NOTES
Cardiac Rehab  Phase II Assessment    Assessment Date: 12-20-19    Diagnosis: CAD    Procedure: CARLOS EDUARDO to LAD  Date of Onset: 12-6-19  ICD/Pacemaker: No   Post-op Complications: na  ECG History: SR with 1 AVB and PVC's EF%:50-55%  Past Medical History:   Patient Active Problem List   Diagnosis     Essential hypertension     Pure hypercholesterolemia     Class 2 obesity due to excess calories in adult     CAD (coronary artery disease)     Past Medical History:   Diagnosis Date     Angina pectoris (H)     per h&p     Hypertension     per H&P     Pure hypercholesterolemia     per H&P     Past Surgical History:   Procedure Laterality Date     CV CORONARY ANGIOGRAM N/A 12/6/2019    Procedure: Coronary Angiogram;  Surgeon: Andria Morrison MD;  Location: [facility];  Service: Cardiology     CV LEFT HEART CATHETERIZATION WITH LEFT VENTRICULOGRAM N/A 12/6/2019    Procedure: Left Heart Catheterization with Left Ventriculogram;  Surgeon: Andria Morrison MD;  Location: [facility];  Service: Cardiology         Physical Assessment  Precautions/ Physical Limitations: Back pain and left leg pain   Oxygen: No  O2 Sats: 95-97% Lung Sounds: Clear Edema: none  Incisions: na  Sleeping Pattern: fair   Appetite: good   Nutrition Risk Screen: no risk at this time    Pain  Location: none  Intensity: (0-10 scale) 0    Psychosocial/ Emotional Health  1. In the past 12 months, have you been in a relationship where you have been abused physically, emotionally, sexually or financially? No  notified: NA  2. Who do you turn to for emotional support?: Wife/ Family  3. Do you have cultural or spiritual needs? No  4. Have there been any major life changes in the past 12 months? Yes - Cousin and Nephew- sudden death    Referral Information  Primary Physician: Bryson Worthington MD  Cardiologist: Jesus  Surgeon: alberta    Home exercise/Equipment: Air dyne/ Gym membership    Patient's long-term goal(s): Home exercise  program    1. Living Accommodations: Home Steps: Yes      Support people at home: Wife   2. Marital Status:   3. Family is able to assist with cares      P[employer]/Community involvement: yes  4. Recreation/Hobbies: Travel, Family        See Doc Flowsheet

## 2021-06-04 NOTE — PROGRESS NOTES
ITP ASSESSMENT   Assessment Day: Initial    Session Number: 1-2  Precautions: Standard    Diagnosis: Stent    Risk Stratification: High    Referring Provider: Andria Morrison MD  EXERCISE  Exercise Assessment: Initial       6 Minute Walk Test   Pre   Pre Exercise HR: 64                    Pre Exercise BP: 126/85      Peak  Peak HR: 101                   Peak BP: 143/93    Peak feet: 1200    Peak O2 SAT: 95    Peak RPE: 5    Peak MPH: 2.27      Symptoms:  Peak Symptoms: Back and left leg pain      5 mins. Post  5 Min Post HR: 68    5 Min Post BP: 145/92                           Exercise Plan  Goals Next 30 days   STG-- Pt will tolerate 40 min of exercise in CR at met levels 2.7-3.2 without sx's.  Pt will establish a home exercise program for 20-25 mins, 3-4 x week. Pt will continue to tolerate PT work as a .  LTG-- Pt will tolerate a exercise program at his gym- 5-6 x week for 40-45 mins at 5.5-6 met level.       Education Goals: All goals in this section met    Education Goals Met: Patient can state cardiac s/s and appropriate emergency response.;Has system for taking medication.;Medication review.      Exercise Prescription  Exercise Mode: Treadmill;Bike;Nustep;Arm Erg.;Hallway Walking    Frequency:  2-3 x week    Duration: 30-40 min    Intensity / THR: 20-30 beats above resting heart rate    RPE 11-14  Progression / Met level: 2.7-3.2    Resistive Training?: Yes      Current Exercise (mins/week): 5      Interventions  Home Exercise:  Mode: Walk/ Bike    Frequency: 3-4 x week    Duration: 20-25 min      Education Material : Educational videos;Provide written material;Individual education and counseling;Offer educational classes      Education Completed  Exercise Education Completed: Cardiac Anatomy;Signs and Symptoms;Medication review;RPE;Emergency Plan;Home Exercise;Warm up/cool down;FITT Principles;BP/HR Reponse to exercise;Benefits of Exercise;End point of exercise              Exercise  "Follow-up/Discharge  Follow up/Discharge: Skilled therapy needed to monitor for CV sx's and cardiac rhythm with increasing workloads, pt will learn to exercise within restrictions of back and left leg pain. Provide education and support for risk factor management.    NUTRITION  Nutrition Assessment: Initial      Nutrition Risk Factors:  Nutrition Risk Factors: Dyslipidemia;Overweight  Cholesterol: 243  LDL: 166  HDL: 47  Triglycerides: 149      Nutrition Plan  Interventions  Other Nutrition Intervention: Diet Class;Therapist/Pt Discussion;Educational Videos;Provide with Written Material      Education Completed  Nutrition Education Completed: Risk factor overview      Goals  Nutrition Goals (Next 30 days): Patient will follow a low sodium diet;Patient will follow a low saturated fat diet;Patient knows appropriate portion size;Patient will lose weight      Goals Met  Nutrition Goals Met: Completed Nutritional Risk Screen;Provided Rate your Plate Survey;Reviewed Dietitian schedule      Height, Weight, and  BMI  Weight: 266 lb (120.7 kg)  Height: 5' 10\" (1.778 m)  BMI: 38.17      Nutrition Follow-up  Follow-up/Discharge: Enc to return diet survey and consult with the dietitian         Other Risk Factors  Other Risk Factor Assessment: Initial      HTN Risk Factor: Hypertension      Pre Exercise BP: 126/85  Post Exercise BP: 104/62      Hypertension Plan  Goals  HTN Goals: Follow low sodium diet;Take medication as prescribed;Exercises regularly      Goals Met  HTN Goals Met: Take medication as prescribed      HTN Interventions  HTN Interventions: Diet consult;Therapist/patient discussion;Provide written material;Offer educational videos;Offer educational classes      HTN Education Completed  HTN Education Completed: Medication review;Risk factor overview      Tobacco Risk Factor: NA        Risk Factor Follow-up   Follow-up/Discharge: Provide education and support for risk factor management     PSYCHOSOCIAL  Psychosocial " Assessment: Initial       [facility] Q of L Summary Score: 26      PHQ-9 Total Score: 8      Psychosocial Risk Factor: Stress      Psychosocial Plan  Interventions    Interventions: Offer educational videos and classes;Provide written material;Individual education and counseling       Education Completed  Education Completed: Relaxation/Coping Techniques;S/S of depression;Effects of stress on body      Goals  Goals (Next 30 days): Identify stressors;Practicing stress management skills;Improvement in [facility] score      Goals Met  Goals Met: Identified Support system;Oriented to stress management classes      Psychosocial Follow-up  Follow-up/Discharge: Reviewed stress as a risk factor       Patient involved in Goal setting?: Yes      Signature: _____________________________________________________________    Date: __________________    Time: __________________See Doc Flowsheet

## 2021-06-04 NOTE — PATIENT INSTRUCTIONS - HE
Medication     o Take all your medications as prescribed  o Do not stop any medications without talking with a healthcare provider    Exercise      o Physical activity is important for overall health  o Set a goal of 150 minutes of exercise each week  o For example, 30 minutes of exercise 5 days each week.    o These 30 minutes can be broken into shorter periods of 15 minutes twice daily or 10 minutes three times daily  o Start any exercise program slowly and work towards the goal of 150 minutes each week  o For example, you may start with 10 minutes and plan to add a few minutes each week as you get stronger   o Examples of exercise include walking, swimming, or biking  o Remember to stretch and stay hydrated with exercise    Diet     o A heart healthy diet includes:  o A variety of fruits and vegetables  o Whole grains  o Low-fat dairy (fat-free, 1% fat, and low-fat)  o Lean meats and poultry without skin   o Fish (eat fish 2 times each week)  o Nuts  o Limit saturated fat to about 13 grams each day (based on a 2000 calorie diet)  o Limit red meat  o Limit sugars (sweets and sugary beverages)  o Limit your portion sizes  o Do not add salt to your food when cooking or at the table  o Limit alcohol intake (no more than 1 drink each day for women or 2 drinks each day for men)    Weight Loss     o Work on losing weight with diet and exercise  o You BMI (body mass index) should be between 18.5-24.9  o This is a calculation of your weight and height  o Please ask your healthcare provider for your BMI    Manage Other Chronic Health Conditions     o Control cholesterol  o Eat a diet low in saturated fat  o Exercise   o Take a statin medication as prescribed  o Manage blood pressure  o Eat a diet low in sodium  o Exercise  o Reduce stress  o Lose weight   o Take blood pressure medications as prescribed  o Control blood sugars if diabetic  o Monitor sugars and carbohydrates in your diet  o Lose weight   o Take diabetes  medications as prescribed  o Follow-up with your primary care provider to make sure your blood sugars are well controlled    Stress Reduction     o Find time each day to relax  o Reading, listening to music, yoga, meditation, exercise, spending time with friends and family, volunteering   o Get 6-8 hours of sleep each night    Smoking Cessation     o Smoking causes numerous health problems including coronary artery disease  o It is never too late to quit  o Set realistic goals for quitting  o Decrease the number of cigarettes used each week  o Use nicotine gum or patches to help you quit    Information from the [employer] Association.  Please visit their website at www.heart.org

## 2021-06-04 NOTE — TELEPHONE ENCOUNTER
From: Andria Lee MD  Sent: 12/12/2019  10:19 AM CST  To: Viridiana Figueroa RN  This 75-year-old gentleman just got recent stents due to accelerated angina.  He is now on dual antiplatelet therapy.  If he wants to try Naprosyn once a day with food we can do that, I am little worried about bleeding probability however.        Called pt to discuss. Shared with him Dr. Lee's recommendations. Pt aware of the increased risk of bleeding with taking Aleve. He will take sparingly and only as needed. Confirmed FU plans with Clare next Wednesday. Encouraged pt to bring questions or concerns up during OV.

## 2021-06-04 NOTE — TELEPHONE ENCOUNTER
----- Message from Beverly Chopra sent at 12/12/2019  9:49 AM CST -----  Regarding: LBF pt  Caller: Cem  Primary cardiologist: Dr. Lee  Detailed reason for call: Cem states he has knee issues and wants to take Naproxen 200mg in a 12 hour release, one in the a.m. and one in the p.m. for this knee pain. Has recent stents. Please call back.   Best phone number: [phone]  Best time to contact: Today  Ok to leave a detailed message? Yes  Device? No        Dr. Lee, is there any issue with pt taking Naproxen 200 mg 12 hour release two times a day for knee pain? Pt had recent stents placed 12/6.

## 2021-06-05 VITALS — WEIGHT: 278 LBS | BODY MASS INDEX: 39.8 KG/M2 | HEIGHT: 70 IN

## 2021-06-05 VITALS
RESPIRATION RATE: 14 BRPM | WEIGHT: 278 LBS | SYSTOLIC BLOOD PRESSURE: 124 MMHG | OXYGEN SATURATION: 91 % | DIASTOLIC BLOOD PRESSURE: 76 MMHG | HEIGHT: 70 IN | HEART RATE: 63 BPM | BODY MASS INDEX: 39.8 KG/M2

## 2021-06-05 NOTE — PATIENT INSTRUCTIONS - HE
Mr Priest EBONY Kevin,  I enjoyed visiting with you again today.  I am glad to hear you are doing well.  Per our conversation let us check the stress test and the lipids in about 2 weeks.  I will plan on seeing you 3-4 months or sooner if needed.  Suresh Lee

## 2021-06-05 NOTE — TELEPHONE ENCOUNTER
LM for patient to call us back as he forgot his medications in the exam room. Medication bag was left at the .

## 2021-06-05 NOTE — PROGRESS NOTES
Cem EBONY Almontedonald has participated in 9 sessions of Phase II Cardiac Rehab. To see Dr. Lee on 1/27/20.    Progress Report:   Cardiac Rehab Treatment Progress Report 1/6/2020 1/13/2020 1/15/2020 1/20/2020 1/22/2020   Weight 270 lbs 271 lbs 271 lbs 267 lbs 270 lbs   Pre Exercise  HR 74 91 81 71 72   Pre Exercise /64 158/86 124/68 124/70 130/78   Nustep Peak Heart Rate 103 100 108 106 100   Nustep Peak Blood Pressure 156/80 158/80 138/76 136/80 150/86   Heart Rate 77 79 83 72 800   Post Exercise /78 128/70 120/60 110/74 130/68   ECG SR/1 AVB/PVC SR, occ-freq PAC's, occ PVC's with exercise, resolved with slightly decreased workloads SR occ PAC's SR/PVC SR;  Occ. PAC's and PVC's   Total Exercise Minutes 45 40 40 40 40         Current Status:  Currently exercising without complaints or symptoms. Reports chest discomfort and arm discomfort at home, did not use NTG. States sx resolve with rest. Pt has been instructed to report sx to MD.    If Physician recommends change in treatment plan, please place orders.        __________________________________________________      _____________  Signature                                                                                                  Date

## 2021-06-05 NOTE — PROGRESS NOTES
ITP ASSESSMENT   Assessment Day: 30 Day    Session Number: 7  Precautions: Standard    Diagnosis: Stent    Risk Stratification: High    Referring Provider: Bryson Worthington MD  EXERCISE  Exercise Assessment: Reassessment                           Exercise Plan  Goals Next 30 days  ADL'S: STG-- Pt will tolerate 40 min of exercise in CR at met levels 3-3.5 without sx's.  Pt will establish a home exercise program for 20-25 mins, 3-4 x week. Pt will continue to tolerate PT work as a .  LTG-- Pt will tolerate a exercise program at his gym- 5-6 x week for 40-45 mins at 5.5-6 met level.     Education Goals: All goals in this section met    Education Goals Met: Patient can state cardiac s/s and appropriate emergency response.;Has system for taking medication.;Medication review.                          Goals Met  Initial ADL's goals met: STG met: Tolerates 40 min of exercise at 3 METS with no sx's.      Initial Leisure goals met: STG not yet met:  Reports he has not established a home exercise plan, has not yet RTW part time.    Initial Progression: Tolerates 40 min of exercise at 2.8-3 METS with no sx's.  Knee pain limits his exercise tolerance.  Encouraged him to use his [employer] bike at home for regular exercise.      Exercise Prescription  Exercise Mode: Bike;Nustep;Arm Erg.;Stairs    Frequency: 2-3x/week    Duration: 40 min    Intensity / THR: 20-30 beats above resting heart rate    RPE 11-14  Progression / Met level: 3-4    Resistive Training?: Yes      Current Exercise (mins/week): 100      Interventions  Home Exercise:  Mode: Bike/Walk    Frequency: 3-4x/week    Duration: 20-30 min      Education Material : Educational videos;Provide written material;Individual education and counseling;Offer educational classes      Education Completed  Exercise Education Completed: Cardiac Anatomy;Signs and Symptoms;Medication review;RPE;Emergency Plan;Home Exercise;Warm up/cool down;FITT Principles;BP/HR Reponse to  "exercise;Benefits of Exercise;End point of exercise              Exercise Follow-up/Discharge  Follow up/Discharge: Skilled therapy needed to monitor for CV sx's and cardiac rhythm, (increased PVC's and PAC's with exercise occasionally,)  with increasing workloads, pt will learn to exercise within restrictions of back and left leg pain. Provide education and support for risk factor management.            NUTRITION  Nutrition Assessment: Reassessment      Nutrition Risk Factors:  Nutrition Risk Factors: Dyslipidemia;Overweight  Cholesterol: 243  LDL: 166  HDL: 47  Triglycerides: 149      Nutrition Plan  Interventions  No data recorded  Other Nutrition Intervention: Diet Class;Therapist/Pt Discussion;Educational Videos;Provide with Written Material      Education Completed  Nutrition Education Completed: Risk factor overview      Goals  Nutrition Goals (Next 30 days): Patient will lose weight;Patient knows appropriate portion size;Patient will follow a low saturated fat diet;Patient will follow a low sodium diet      Goals Met  Nutrition Goals Met: Completed Nutritional Risk Screen;Provided Rate your Plate Survey;Reviewed Dietitian schedule      Height, Weight, and  BMI  Weight: 271 lb (122.9 kg)  Height: 5' 10\" (1.778 m)  BMI: 38.88      Nutrition Follow-up  Follow-up/Discharge: Enc to return diet survey and consult with the dietitian       Other Risk Factors  Other Risk Factor Assessment: Reassessment      HTN Risk Factor: Hypertension      Pre Exercise BP: 124/68  Post Exercise BP: 128/70      Hypertension Plan  Goals  HTN Goals: Follow low sodium diet;Exercises regularly      Goals Met  HTN Goals Met: Take medication as prescribed      HTN Interventions  HTN Interventions: Diet consult;Therapist/patient discussion;Provide written material;Offer educational videos;Offer educational classes      HTN Education Completed  HTN Education Completed: Medication review;Risk factor overview      Tobacco Risk Factor: " NA    Risk Factor Follow-up   Follow-up/Discharge: BP's have been occasionally elevated.  Will continue to monitor.         PSYCHOSOCIAL  Psychosocial Assessment: Reassessment       [facility] INGRID Q of L Summary Score: 26      PHQ-9 Total Score: 8      Psychosocial Risk Factor: Stress      Psychosocial Plan  Interventions  Interventions: Offer educational videos and classes;Provide written material;Individual education and counseling      Education Completed  Education Completed: Relaxation/Coping Techniques;S/S of depression;Effects of stress on body      Goals  Goals (Next 30 days): Practicing stress management skills      Goals Met  Goals Met: Identified Support system;Oriented to stress management classes      Psychosocial Follow-up  Follow-up/Discharge: Offered class on Effects of Stress on Your Heart.           Patient involved in Goal setting?: Yes      Signature: _____________________________________________________________    Date: __________________    Time: __________________See Doc Flowsheet

## 2021-06-06 NOTE — PROGRESS NOTES
ITP ASSESSMENT   Assessment Day: 60 Day    Session Number: 12  Precautions: Standard    Diagnosis: Stent    Risk Stratification: High    Referring Provider: Bryson Worthington MD   ITPs sent to Dr. Lee  EXERCISE  Exercise Assessment: Reassessment                           Exercise Plan  Goals Next 30 days  ADL'S: STG-- Pt will tolerate 40 min of exercise in CR at met levels 3-3.5 without sx's. Pt will continue to tolerate PT work as a .  LTG-- Pt will tolerate a exercise program at his gym- 5-6 x week for 40-45 mins at 5.5-6 met level.     Education Goals: All goals in this section met    Education Goals Met: Patient can state cardiac s/s and appropriate emergency response.;Has system for taking medication.;Medication review.                          Goals Met  30 day ADL'S goals met: Goal met: Pt exercises 30-40 minutes 2x/week at health club     30 day Leisure goals met: Goal partially met: Pt is able to tolerate 3 METs for 40 minutes     30 Day Progression: Pt has reached 3 METs on Nustep. Continues to progress well.       Initial ADL's goals met: STG met: Tolerates 40 min of exercise at 3 METS with no sx's.      Initial Leisure goals met: STG not yet met:  Reports he has not established a home exercise plan, has not yet RTW part time.    Initial Progression: Tolerates 40 min of exercise at 2.8-3 METS with no sx's.  Knee pain limits his exercise tolerance.  Encouraged him to use his [employer] bike at home for regular exercise.      Exercise Prescription  Exercise Mode: Bike;Nustep;Arm Erg.;Stairs    Frequency: 2-3x/week    Duration: 40 min    Intensity / THR: 20-30 beats above resting heart rate    RPE 11-14  Progression / Met level: 3-4    Resistive Training?: Yes      Current Exercise (mins/week): 130      Interventions  Home Exercise:  Mode: Bike/Walk    Frequency: 3-4x/week    Duration: 30-40 min      Education Material : Educational videos;Provide written material;Individual education and  "counseling;Offer educational classes      Education Completed  Exercise Education Completed: Cardiac Anatomy;Signs and Symptoms;Medication review;RPE;Emergency Plan;Home Exercise;Warm up/cool down;FITT Principles;BP/HR Reponse to exercise;Benefits of Exercise;End point of exercise              Exercise Follow-up/Discharge  Follow up/Discharge: Skilled therapy needed to monitor for CV sx's and cardiac rhythm, (increased PVC's and PAC's with exercise occasionally,)  with increasing workloads, pt will learn to exercise within restrictions of back and left leg pain. Provide education and support for risk factor management.    NUTRITION  Nutrition Assessment: Reassessment      Nutrition Risk Factors:  Nutrition Risk Factors: Dyslipidemia;Overweight  Cholesterol: 204 (2/10/2020)  LDL: 124  HDL: 52  Triglycerides: 140      Nutrition Plan  Interventions  Diet Consult: Completed    Other Nutrition Intervention: Therapist/Pt Discussion;Provide with Written Material    Initial Rate Your Plate Score: 44    Education Completed  Nutrition Education Completed: Low Saturated fat diet;Low sodium diet      Goals  Nutrition Goals (Next 30 days): Patient will follow a low saturated fat diet;Patient knows appropriate portion size      Goals Met  Nutrition Goals Met: Completed Nutritional Risk Screen;Provided Rate your Plate Survey;Reviewed Dietitian schedule      Height, Weight, and  BMI  Weight: 265 lb (120.2 kg)  Height: 5' 10\" (1.778 m)  BMI: 38.02      Nutrition Follow-up  Follow-up/Discharge: Encouraged pt to ask RD questions as needed         Other Risk Factors  Other Risk Factor Assessment: Reassessment      HTN Risk Factor: Hypertension      Pre Exercise BP: 122/74  Post Exercise BP: 116/66      Hypertension Plan  Goals  HTN Goals: Follow low sodium diet;Exercises regularly      Goals Met  HTN Goals Met: Take medication as prescribed      HTN Interventions  HTN Interventions: Diet consult;Therapist/patient discussion;Provide " written material;Offer educational videos;Offer educational classes      HTN Education Completed  HTN Education Completed: Medication review;Risk factor overview      Tobacco Risk Factor: NA      Risk Factor Follow-up   Follow-up/Discharge: BP's have been occasionally elevated.  Will continue to monitor.     PSYCHOSOCIAL  Psychosocial Assessment: Reassessment       [facility] INGRID Q of L Summary Score: 26      PHQ-9 Total Score: 8      Psychosocial Risk Factor: Stress      Psychosocial Plan  Interventions  Interventions: Offer educational videos and classes;Provide written material;Individual education and counseling      Education Completed  Education Completed: Relaxation/Coping Techniques;S/S of depression;Effects of stress on body      Goals  Goals (Next 30 days): Practicing stress management skills      Goals Met  Goals Met: Identified Support system;Oriented to stress management classes      Psychosocial Follow-up  Follow-up/Discharge: Offered class on Effects of Stress on Your Heart.             Patient involved in Goal setting?: Yes      Signature: _____________________________________________________________    Date: __________________    Time: __________________

## 2021-06-06 NOTE — PROGRESS NOTES
ITP ASSESSMENT   Assessment Day: 90 Day    Session Number: 16  Precautions: Standard    Diagnosis: Stent    Risk Stratification: High    Referring Provider: Bryson Worthington MD   ITP:Dr. Lee  EXERCISE  Exercise Assessment: Reassessment                          Exercise Plan  Goals Next 30 days  ADL'S: STG:Pt will tolerate 40 min exercise in CR at 3.3-4MET level and start light weight training.    Leisure: LTG:Pt will exercise at his gym 2-3x/week for 40-45 min, gradually increasing to 5-5-6 MET level    Education Goals: All goals in this section met    Education Goals Met: Patient can state cardiac s/s and appropriate emergency response.;Has system for taking medication.;Medication review.                          Goals Met  60 day ADL'S goals met: STG partially met, pt is tolerating 40 min of eercise at 3-3.3MET level.    60 day Leisure goals met: LTG not met, pt has been inconsistent with going to gym for exercise, states he is too busy.    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    60 Day Progression: Pt has progressed to 3-3.3MET level on the nustep.  Pt refuses treadmill due to knee pain.      30 day ADL'S goals met: Goal met: Pt exercises 30-40 minutes 2x/week at health club     30 day Leisure goals met: Goal partially met: Pt is able to tolerate 3 METs for 40 minutes     30 Day Progression: Pt has reached 3 METs on Nustep. Continues to progress well.       Initial ADL's goals met: STG met: Tolerates 40 min of exercise at 3 METS with no sx's.      Initial Leisure goals met: STG not yet met:  Reports he has not established a home exercise plan, has not yet RTW part time.    Initial Progression: Tolerates 40 min of exercise at 2.8-3 METS with no sx's.  Knee pain limits his exercise tolerance.  Encouraged him to use his [employer] bike at home for regular exercise.      Exercise Prescription  Exercise Mode: Nustep    Frequency: 2x/week    Duration: 40 min    Intensity / THR:  "20-30 beats above resting heart rate    RPE 11-14  Progression / Met level: 3.3-4.5    Resistive Training?: Yes      Current Exercise (mins/week): 190      Interventions  Home Exercise:  Mode: bike, walk    Frequency: 3-4x/week    Duration: 30-40 min      Education Material : Educational videos;Provide written material;Individual education and counseling;Offer educational classes      Education Completed  Exercise Education Completed: Cardiac Anatomy;Signs and Symptoms;Medication review;RPE;Emergency Plan;Home Exercise;Warm up/cool down;FITT Principles;BP/HR Reponse to exercise;Benefits of Exercise;End point of exercise              Exercise Follow-up/Discharge  Follow up/Discharge: Skilled therapy continues to be  needed to monitor for CV sx's and cardiac rhythm, (increased PVC's and PAC's with exercise occasionally,)  with increasing workloads, pt will learn to exercise within restrictions of back and left leg pain. Provide education and support for risk factor management.    NUTRITION  Nutrition Assessment: Reassessment      Nutrition Risk Factors:  Nutrition Risk Factors: Dyslipidemia;Overweight  Cholesterol: 204  LDL: 124  HDL: 52  Triglycerides: 140      Nutrition Plan  Interventions  Diet Consult: Completed    Other Nutrition Intervention: Therapist/Pt Discussion;Provide with Written Material    Initial Rate Your Plate Score: 44      Education Completed  Nutrition Education Completed: Low Saturated fat diet;Low sodium diet      Goals  Nutrition Goals (Next 30 days): Patient will follow a low sodium diet;Patient will follow a low saturated fat diet;Patient knows appropriate portion size      Goals Met  Nutrition Goals Met: Completed Nutritional Risk Screen;Provided Rate your Plate Survey;Reviewed Dietitian schedule;Patient follows a low sodium diet;Patient has lost weight;Patient states following a low saturated fat diet      Height, Weight, and  BMI  Weight: 265 lb (120.2 kg)  Height: 5' 10\" (1.778 m)  BMI: " 38.02      Nutrition Follow-up  Follow-up/Discharge: Pt reports he is doing better with low sodium and low fat, encouraged to continue improving his heart heallthy diet         Other Risk Factors  Other Risk Factor Assessment: Reassessment      HTN Risk Factor: Hypertension      Pre Exercise BP: 126/70  Post Exercise BP: 112/66      Hypertension Plan  Goals  HTN Goals: Exercises regularly      Goals Met  HTN Goals Met: Follow low sodium diet;Take medication as prescribed      HTN Interventions  HTN Interventions: Diet consult;Therapist/patient discussion;Provide written material;Offer educational videos;Offer educational classes      HTN Education Completed  HTN Education Completed: Medication review;Risk factor overview      Tobacco Risk Factor: NA      Risk Factor Follow-up   Follow-up/Discharge: Pt encouraged to be more consistent with home exercise.     PSYCHOSOCIAL  Psychosocial Assessment: Reassessment       [place] COOP Q of L Summary Score: 26      PHQ-9 Total Score: 8      Psychosocial Risk Factor: Stress      Psychosocial Plan  Interventions  Interventions: Offer educational videos and classes;Provide written material;Individual education and counseling      Education Completed  Education Completed: Relaxation/Coping Techniques;S/S of depression;Effects of stress on body      Goals  Goals (Next 30 days): Improvement in [place] COOP score      Goals Met  Goals Met: Identified Support system;Oriented to stress management classes;Identify stressors;Practicing stress management skills      Psychosocial Follow-up  Follow-up/Discharge: Pt reports he likes to read for relaxation             Patient involved in Goal setting?: Yes      Signature: _____________________________________________________________    Date: __________________    Time: __________________

## 2021-06-07 NOTE — PROGRESS NOTES
ITP ASSESSMENT   Assessment Day: 120 Day    Session Number: 17  Precautions: Standard    Diagnosis: Stent    Risk Stratification: High    Referring Provider: Bryson Worthington MD  EXERCISE  Exercise Assessment: Reassessment    Tolerated 40' of exercise at 3.3 Mets,  Pt is on hold secondary to Corona-Virus                            Exercise Plan  Goals Next 30 days   STG:Pt will tolerate 40 min exercise in CR at 3.3-4MET level and start light weight training.     LTG:Pt will exercise at his gym 2-3x/week for 40-45 min, gradually increasing to 5-5-6 MET level      Education Goals: All goals in this section met    Education Goals Met: Patient can state cardiac s/s and appropriate emergency response.;Has system for taking medication.;Medication review.                          Goals Met  90 day ADL'S goals met: Will l evaluate goals when pt resumes        60 day ADL'S goals met: STG partially met, pt is tolerating 40 min of eercise at 3-3.3MET level.    60 day Leisure goals met: LTG not met, pt has been inconsistent with going to gym for exercise, states he is too busy.    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    60 Day Progression: Pt has progressed to 3-3.3MET level on the nustep.  Pt refuses treadmill due to knee pain.      30 day ADL'S goals met: Goal met: Pt exercises 30-40 minutes 2x/week at health club     30 day Leisure goals met: Goal partially met: Pt is able to tolerate 3 METs for 40 minutes   30 Day Progression: Pt has reached 3 METs on Nustep. Continues to progress well.       Initial ADL's goals met: STG met: Tolerates 40 min of exercise at 3 METS with no sx's.      Initial Leisure goals met: STG not yet met:  Reports he has not established a home exercise plan, has not yet RTW part time.    Initial Progression: Tolerates 40 min of exercise at 2.8-3 METS with no sx's.  Knee pain limits his exercise tolerance.  Encouraged him to use his [employer] bike at home for regular  exercise.      Exercise Prescription  Exercise Mode: Nustep    Frequency: 2x/week    Duration: 40 min    Intensity / THR: 20-30 beats above resting heart rate    RPE 11-14  Progression / Met level: 3.3-4.5    Resistive Training?: Yes      Current Exercise (mins/week): 190      Interventions  Home Exercise:  Mode: bike, walk    Frequency: 3-4x/week    Duration: 3-4x/week      Education Material : Educational videos;Provide written material;Individual education and counseling;Offer educational classes      Education Completed  Exercise Education Completed: Cardiac Anatomy;Signs and Symptoms;Medication review;RPE;Emergency Plan;Home Exercise;Warm up/cool down;FITT Principles;BP/HR Reponse to exercise;Benefits of Exercise;End point of exercise              Exercise Follow-up/Discharge  Follow up/Discharge: Skilled therapy continues to be  needed to monitor for CV sx's and cardiac rhythm, (increased PVC's and PAC's with exercise occasionally,)  with increasing workloads, pt will learn to exercise within restrictions of back and left leg pain. Provide education and support for risk factor management.    NUTRITION  Nutrition Assessment: Reassessment      Nutrition Risk Factors:  Nutrition Risk Factors: Dyslipidemia;Overweight  Cholesterol: 204  LDL: 124  HDL: 52  Triglycerides: 140      Nutrition Plan  Interventions  Diet Consult: Completed    Other Nutrition Intervention: Therapist/Pt Discussion;Provide with Written Material    Initial Rate Your Plate Score: 44      Education Completed  Nutrition Education Completed: Low Saturated fat diet;Low sodium diet      Goals  Nutrition Goals (Next 30 days): Patient will follow a low sodium diet;Patient will follow a low saturated fat diet;Patient knows appropriate portion size      Goals Met  Nutrition Goals Met: Completed Nutritional Risk Screen;Provided Rate your Plate Survey;Reviewed Dietitian schedule;Patient follows a low sodium diet;Patient has lost weight;Patient states  "following a low saturated fat diet      Height, Weight, and  BMI  Weight: 262 lb (118.8 kg)  Height: 5' 10\" (1.778 m)  BMI: 37.59      Nutrition Follow-up  Follow-up/Discharge: Pt reports he is doing better with low sodium and low fat, encouraged to continue improving his heart heallthy diet         Other Risk Factors  Other Risk Factor Assessment: Reassessment      HTN Risk Factor: Hypertension      Pre Exercise BP: 118/66  Post Exercise BP: 110/68      Hypertension Plan  Goals  HTN Goals: Exercises regularly      Goals Met  HTN Goals Met: Follow low sodium diet;Take medication as prescribed      HTN Interventions  HTN Interventions: Diet consult;Therapist/patient discussion;Provide written material;Offer educational videos;Offer educational classes      HTN Education Completed  HTN Education Completed: Medication review;Risk factor overview      Tobacco Risk Factor: NA        Risk Factor Follow-up   Follow-up/Discharge: Nilesh review Risk factor education when pt resumes     PSYCHOSOCIAL  Psychosocial Assessment: Reassessment       [facility]: 26      PHQ-9 Total Score: 8      Psychosocial Risk Factor: Stress      Psychosocial Plan  Interventions  Interventions: Offer educational videos and classes;Provide written material;Individual education and counseling      Education Completed  Education Completed: Relaxation/Coping Techniques;S/S of depression;Effects of stress on body      Goals  Goals (Next 30 days): Improvement in [place] CO[employer] score      Goals Met  Goals Met: Identified Support system;Oriented to stress management classes;Identify stressors;Practicing stress management skills      Psychosocial Follow-up  Follow-up/Discharge: Will review Stress/ Relaxation when pt resumes   (W)             Patient involved in Goal setting?: No (ON hold secondary to COVID -19)      Signature: _____________________________________________________________    Date: __________________    Time: " __________________

## 2021-06-07 NOTE — PROGRESS NOTES
"ITP ASSESSMENT   Assessment Day: 150 Day    Session Number: 18  Precautions: Standard    Diagnosis: Stent    Risk Stratification: High    Referring Provider: Bryson Worthington MD  EXERCISE  Exercise Assessment: Reassessment    Resumes after being on hold due to Covid 19 precautions.  States \"I'm back at [facility] [address]\"  So needs to continue CR.                           Exercise Plan  Goals Next 30 days  ADL'S: STG:Pt will tolerate 40 min exercise in CR at 3.3-4MET level and start light weight training.    Leisure: STG: Lose 1-2 lbs /month with daily exercise and portion-controlled, cardiac diet    Education Goals: All goals in this section met    Education Goals Met: Patient can state cardiac s/s and appropriate emergency response.;Has system for taking medication.;Medication review.                          Goals Met        120 day ADL'S goals met: Goal met:  Tolerates 40 min of exercise at 3.2-3.4 METS,    120 Day Progress: SOB continues to limit his progress.  Will advance workloads as tolerates      90 day ADL'S goals met: Will l evaluate goals when pt resumes    60 day ADL'S goals met: STG partially met, pt is tolerating 40 min of eercise at 3-3.3MET level.    60 day Leisure goals met: LTG not met, pt has been inconsistent with going to gym for exercise, states he is too busy.    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    60 Day Progression: Pt has progressed to 3-3.3MET level on the nustep.  Pt refuses treadmill due to knee pain.      30 day ADL'S goals met: Goal met: Pt exercises 30-40 minutes 2x/week at health club     30 day Leisure goals met: Goal partially met: Pt is able to tolerate 3 METs for 40 minutes     No data recorded  30 Day Progression: Pt has reached 3 METs on Nustep. Continues to progress well.       Exercise Prescription  Exercise Mode: Nustep;Arm Erg.;Stairs    Frequency: 2x/week    Duration: 40 min    Intensity / THR: 20-30 beats above resting heart rate    RPE " "11-14  Progression / Met level: 3.4-4    Resistive Training?: Yes      Current Exercise (mins/week): 60      Interventions  Home Exercise:  Mode: Bike, walk    Frequency: 3-4x/week    Duration: 30-40 min      Education Material : Educational videos;Provide written material;Individual education and counseling;Offer educational classes      Education Completed  Exercise Education Completed: Cardiac Anatomy;Signs and Symptoms;Medication review;RPE;Emergency Plan;Home Exercise;Warm up/cool down;FITT Principles;BP/HR Reponse to exercise;Benefits of Exercise;End point of exercise;Stretching;Strength training              Exercise Follow-up/Discharge  Follow up/Discharge: Skilled therapy continues to be  needed to monitor for CV sx's and cardiac rhythm, (increased PVC's and PAC's with exercise occasionally,)  with increasing workloads, pt will learn to exercise within restrictions of back and left leg pain. Provide education and support for risk factor management.    NUTRITION  Nutrition Assessment: Reassessment      Nutrition Risk Factors:  Nutrition Risk Factors: Dyslipidemia;Overweight      Nutrition Plan  Interventions  Diet Consult: Completed    Other Nutrition Intervention: Therapist/Pt Discussion;Provide with Written Material    Initial Rate Your Plate Score: 44      Education Completed  Nutrition Education Completed: Low Saturated fat diet;Low sodium diet      Goals  Nutrition Goals (Next 30 days): Patient will follow a low sodium diet;Patient will follow a low saturated fat diet;Patient knows appropriate portion size;Patient will lose weight      Goals Met  Nutrition Goals Met: Completed Nutritional Risk Screen;Provided Rate your Plate Survey;Reviewed Dietitian schedule;Patient follows a low sodium diet;Patient has lost weight;Patient states following a low saturated fat diet      Height, Weight, and  BMI  Weight: 269 lb (122 kg)  Height: 5' 10\" (1.778 m)  BMI: 38.6    Pre BMI: 38.6     Nutrition " "Follow-up  Follow-up/Discharge: Has gained 7 lbs since being on hold due to Covid 19 precautions.  Wants to get back \"on track\" and improve his health.         Other Risk Factors  Other Risk Factor Assessment: Reassessment      HTN Risk Factor: Hypertension      Pre Exercise BP: 128/66  Post Exercise BP: 112/68      Hypertension Plan  Goals  HTN Goals: Exercises regularly      Goals Met  HTN Goals Met: Follow low sodium diet;Take medication as prescribed      HTN Interventions  HTN Interventions: Diet consult;Therapist/patient discussion;Provide written material;Offer educational videos;Offer educational classes      HTN Education Completed  HTN Education Completed: Medication review;Risk factor overview      Tobacco Risk Factor: NA    Risk Factor Follow-up   Follow-up/Discharge: BP's are occ elevated, will continue to monitor this.     PSYCHOSOCIAL  Psychosocial Assessment: Reassessment      Psychosocial Risk Factor: Stress      Psychosocial Plan  Interventions  If PHQ-9 is >9, send letter to MD  Interventions: Offer educational videos and classes;Provide written material;Individual education and counseling      Education Completed  Education Completed: Relaxation/Coping Techniques;S/S of depression;Effects of stress on body      Goals  Goals (Next 30 days): Practicing stress management skills      Goals Met  Goals Met: Identified Support system;Oriented to stress management classes;Identify stressors;Practicing stress management skills      Psychosocial Follow-up  Follow-up/Discharge: Continues to work on stress management, health concerns are stressful to him.             Patient involved in Goal setting?: Yes      Signature: _____________________________________________________________    Date: __________________    Time: __________________See Doc Flowsheet  "

## 2021-06-08 NOTE — PROGRESS NOTES
ITP ASSESSMENT   Assessment Day: 180 Day    Session Number: 0    Diagnosis: Stent    Risk Stratification: High    Referring Provider: Bryson Worthington MD  EXERCISE  Exercise Assessment: Reassessment    Continues to feel limited by shortness of breath with exercise.  Tolerates 40 min at 3.5 METS.                         Exercise Plan  Goals Next 30 days  ADL'S: STG:Pt will tolerate 40 min exercise in CR at 3.3-4MET level and start light weight training.    Leisure: STG: Lose 1-2 lbs /month with daily exercise and portion-controlled, cardiac diet    Education Goals: All goals in this section met    Education Goals Met: Patient can state cardiac s/s and appropriate emergency response.;Has system for taking medication.;Medication review.                          Goals Met  150+ day ADL'S goals met: Goal partially met: Tolerates 40 min of exercise at 3.5 METS  but is limited by SOB   150+ day Leisure goals met: Goal not met:  No weight loss this past month      150+ Day Progress: Slow progress as Pt reports he continues to be limited by SOB with activity/exercise.  Has missed several sessions and was encouraged to report sx's to MD.       150+ day ADL'S goals met: Goal partially met: Tolerates 40 min of exercise at 3.5 METS  but is limited by SOB   150+ day Leisure goals met: Goal not met:  No weight loss this past month      150+ Day Progress: Slow progress as Pt reports he continues to be limited by SOB with activity/exercise.  Has missed several sessions and was encouraged to report sx's to MD.       120 day ADL'S goals met: Goal met:  Tolerates 40 min of exercise at 3.2-3.4 METS,    120 Day Progress: SOB continues to limit his progress.  Will advance workloads as tolerates      90 day ADL'S goals met: Will l evaluate goals when pt resumes    60 day ADL'S goals met: STG partially met, pt is tolerating 40 min of eercise at 3-3.3MET level.    60 day Leisure goals met: LTG not met, pt has been inconsistent with going  to gym for exercise, states he is too busy.    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    60 Day Progression: Pt has progressed to 3-3.3MET level on the nustep.  Pt refuses treadmill due to knee pain.      30 day ADL'S goals met: Goal met: Pt exercises 30-40 minutes 2x/week at health club     30 day Leisure goals met: Goal partially met: Pt is able to tolerate 3 METs for 40 minutes     30 Day Progression: Pt has reached 3 METs on Nustep. Continues to progress well.       Exercise Prescription  Exercise Mode: Nustep;Arm Erg.;Stairs    Frequency: 2x/week    Duration: 40 min    Intensity / THR: 20-30 beats above resting heart rate    RPE 11-14  Progression / Met level: 3.5-4    Resistive Training?: Yes      Current Exercise (mins/week): 60      Interventions  Home Exercise:  Mode: Bike, walk    Frequency: 3-4x/week    Duration: 30-40 min      Education Material : Educational videos;Provide written material;Individual education and counseling;Offer educational classes      Education Completed  Exercise Education Completed: Cardiac Anatomy;Signs and Symptoms;Medication review;RPE;Emergency Plan;Home Exercise;Warm up/cool down;FITT Principles;BP/HR Reponse to exercise;Benefits of Exercise;End point of exercise;Stretching;Strength training              Exercise Follow-up/Discharge  Follow up/Discharge: Skilled therapy continues to be  needed to monitor for CV sx's and cardiac rhythm, (increased PVC's and PAC's with exercise occasionally,)  with increasing workloads, pt will learn to exercise within restrictions of back and left leg pain. Provide education and support for risk factor management.    NUTRITION  Nutrition Assessment: Reassessment      Nutrition Risk Factors:  Nutrition Risk Factors: Dyslipidemia;Overweight      Nutrition Plan  Interventions  Diet Consult: Completed    Other Nutrition Intervention: Therapist/Pt Discussion;Provide with Written Material    Initial Rate Your Plate  "Score: 44    Pre       Education Completed  Nutrition Education Completed: Low Saturated fat diet;Low sodium diet      Goals  Nutrition Goals (Next 30 days): Patient will lose weight;Patient will follow a low sodium diet;Patient will follow a low saturated fat diet;Patient knows appropriate portion size      Goals Met  Nutrition Goals Met: Completed Nutritional Risk Screen;Provided Rate your Plate Survey;Reviewed Dietitian schedule;Patient follows a low sodium diet;Patient has lost weight;Patient states following a low saturated fat diet      Height, Weight, and  BMI  Weight: 269 lb (122 kg)  Height: 5' 10\" (1.778 m)  BMI: 38.6    Pre BMI: 38.6     Nutrition Follow-up  Follow-up/Discharge: No weight loss (or gain) this month.  Will continue to provide support and education for losing wt to manage risk factors.         Other Risk Factors  Other Risk Factor Assessment: Reassessment      HTN Risk Factor: Hypertension      Pre Exercise BP: 114/68  Post Exercise BP: 116/66      Hypertension Plan  Goals  HTN Goals: Exercises regularly      Goals Met  HTN Goals Met: Follow low sodium diet;Take medication as prescribed      HTN Interventions  HTN Interventions: Diet consult;Therapist/patient discussion;Provide written material;Offer educational videos;Offer educational classes      HTN Education Completed  HTN Education Completed: Medication review;Risk factor overview;Low sodium diet      Tobacco Risk Factor: NA      Risk Factor Follow-up   Follow-up/Discharge: BP's are controlled.     PSYCHOSOCIAL  Psychosocial Assessment: Reassessment       Pre       Pre       Psychosocial Risk Factor: Stress      Psychosocial Plan  Interventions  If PHQ-9 is >9, send letter to MD  Interventions: Offer educational videos and classes;Provide written material;Individual education and counseling      Education Completed  Education Completed: Relaxation/Coping Techniques;S/S of depression;Effects of stress on body      Goals  Goals (Next 30 " days): Practicing stress management skills      Goals Met  Goals Met: Identified Support system;Oriented to stress management classes;Identify stressors;Practicing stress management skills      Psychosocial Follow-up  Follow-up/Discharge: Continues to work on stress management, health concerns are stressful to him.             Patient involved in Goal setting?: No (Goals to be reviewed when Pt. returns to CR)      Signature: _____________________________________________________________    Date: __________________    Time: __________________See Doc Flowsheet

## 2021-06-09 NOTE — TELEPHONE ENCOUNTER
----- Message from Andria Lee MD sent at 6/18/2020  1:35 PM CDT -----  Regarding: FW: Cardiac Rehab Arrhythmia  Can you please contact patient and let him know cardiac rehab contacted me and I reviewed chart.Does have what appear to be PACs with a Baron SANABRIADoubt this is PVC triplets.In any event, would benefit from beta-blocker therapy, would like to start metoprolol tartrate 25 mg p.o. twice daily and have him cut his amlodipine in half from 10 to 5 mg a day.  Have him monitor blood pressures and let us know if they start running a mock.LF  ----- Message -----  From: Salina Alvarado EPS  Sent: 6/18/2020  10:15 AM CDT  To: Andria Lee MD  Subject: Cardiac Rehab Arrhythmia                         Good Morning,   Your patient was here for his 26th session of Cardiac Rehab. At the end of his session he had a couple PVC triplets with one episode followed by SVT with HR 187bpm. Pt was asymptomatic but states he did feel like he was working at the end. 7 Lead EKG is saved in the media folder. Thanks.            Called patient to update on recommendations from [facility]BF. Went over rhythm strips. He said that he was really pushing himself as hard as he could go on that day at rehab. He is comfortable with the medication changes but would like to give it another day at rehab to see if it occurs again. He [facility]. He will cut his Amlodipine in half once he starts the Metoprolol. He plans on making some life style changes and losing some weight. He will call after his cardiac rehab on Tuesday and see how his rhythm strips went there. Orders placed. -[facility] [place]

## 2021-06-09 NOTE — PROGRESS NOTES
Cem Kevin has participated in 32 sessions of Phase II Cardiac Rehab.    Progress Report:   Cardiac Rehab Treatment Progress Report 6/25/2020 6/30/2020 7/2/2020 7/7/2020 7/9/2020   Weight 268 lbs 268 lbs 269 lbs 271 lbs 269 lbs   Pre Exercise  HR 64 56 59 72 73   Pre Exercise /68 122/72 118/64 130/70 104/60   Treadmill Peak HR - 111 105 - -   Nustep Peak Heart Rate 102 95 90 92 104   Nustep Peak Blood Pressure 158/70 158/84 142/66 138/74 136/66   Heart Rate 67 65 68 72 72   Post Exercise /66 106/70 112/64 130/76 110/64   ECG SR/ST with PAC, PVC SB/SR/ST with PACs/PVCs SB/SR/ST with PVC SR/BBB, PAC's SR/BBB, PVC/PACs   Total Exercise Minutes 40 45 40 45 40         Current Status:  Currently exercising without complaints or symptoms.    If Physician recommends change in treatment plan, please place orders.        __________________________________________________      _____________  Signature                                                                                                  Date

## 2021-06-09 NOTE — PROGRESS NOTES
ITP ASSESSMENT   Assessment Day: 210 Day    Session Number: 29  Precautions: Standard    Diagnosis: Stent    Risk Stratification: High    Referring Provider: Bryson Worthington MD  EXERCISE  Exercise Assessment: Reassessment                           Exercise Plan  Goals Next 30 days   STG:Pt will tolerate 40 min exercise in CR at 3.5-4 MET level and start light weight training.    STG: Lose 1-2 lbs /month with daily exercise and portion-controlled, cardiac diet    STG: Tolerate 10-15 min on TM each session      Education Goals: All goals in this section met    Education Goals Met: Patient can state cardiac s/s and appropriate emergency response.;Has system for taking medication.;Medication review.                          Goals Met  150+ day ADL'S goals met: Pt has reached 3.1 to 3.3 METs   150+ day Leisure goals met: Pts wt remains the same      150+ Day Progress: Pt is progressing slowly, pt is motivated to increase tolerance of TM over the next 30 days     150+ day ADL'S goals met: Pt has reached 3.1 to 3.3 METs   150+ day Leisure goals met: Pts wt remains the same      150+ Day Progress: Pt is progressing slowly, pt is motivated to increase tolerance of TM over the next 30 days     120 day ADL'S goals met: Goal met:  Tolerates 40 min of exercise at 3.2-3.4 METS,    120 Day Progress: SOB continues to limit his progress.  Will advance workloads as tolerates      90 day ADL'S goals met: Will l evaluate goals when pt resumes      60 day ADL'S goals met: STG partially met, pt is tolerating 40 min of eercise at 3-3.3MET level.    60 day Leisure goals met: LTG not met, pt has been inconsistent with going to gym for exercise, states he is too busy.    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    60 Day Progression: Pt has progressed to 3-3.3MET level on the nustep.  Pt refuses treadmill due to knee pain.      30 day ADL'S goals met: Goal met: Pt exercises 30-40 minutes 2x/week at health  club     30 day Leisure goals met: Goal partially met: Pt is able to tolerate 3 METs for 40 minutes     30 Day Progression: Pt has reached 3 METs on Nustep. Continues to progress well.       Exercise Prescription  Exercise Mode: Nustep;Arm Erg.;Stairs    Frequency: 2x/week    Duration: 40 min    Intensity / THR: 20-30 beats above resting heart rate    RPE 11-14  Progression / Met level: 3.5-4    Resistive Training?: Yes      Current Exercise (mins/week): 80      Interventions  Home Exercise:  Mode: Bike/walk    Frequency: 3-5x/week    Duration: 30-40 min      Education Material : Educational videos;Provide written material;Individual education and counseling;Offer educational classes      Education Completed  Exercise Education Completed: Cardiac Anatomy;Signs and Symptoms;Medication review;RPE;Emergency Plan;Home Exercise;Warm up/cool down;FITT Principles;BP/HR Reponse to exercise;Benefits of Exercise;End point of exercise;Stretching;Strength training              Exercise Follow-up/Discharge  Follow up/Discharge: Skilled therapy continues to be  needed to monitor for CV sx's and cardiac rhythm, (increased PVC's and PAC's with exercise occasionally,)  with increasing workloads, pt will learn to exercise within restrictions of back and left leg pain. Provide education and support for risk factor management.    NUTRITION  Nutrition Assessment: Reassessment      Nutrition Risk Factors:  Nutrition Risk Factors: Dyslipidemia;Overweight  Cholesterol: 204  LDL: 124  HDL: 52  Triglycerides: 140      Nutrition Plan  Interventions  Diet Consult: Completed    Other Nutrition Intervention: Therapist/Pt Discussion;Provide with Written Material    Initial Rate Your Plate Score: 44    Education Completed  Nutrition Education Completed: Low Saturated fat diet;Low sodium diet      Goals  Nutrition Goals (Next 30 days): Patient will lose weight;Patient will follow a low sodium diet;Patient will follow a low saturated fat  "diet;Patient knows appropriate portion size      Goals Met  Nutrition Goals Met: Completed Nutritional Risk Screen;Provided Rate your Plate Survey;Reviewed Dietitian schedule;Patient follows a low sodium diet;Patient has lost weight;Patient states following a low saturated fat diet      Height, Weight, and  BMI  Weight: 268 lb (121.6 kg)  Height: 5' 10\" (1.778 m)  BMI: 38.45      Nutrition Follow-up  Follow-up/Discharge: No weight loss (or gain) this month.  Will continue to provide support and education for losing wt to manage risk factors.         Other Risk Factors  Other Risk Factor Assessment: Reassessment      HTN Risk Factor: Hypertension      Pre Exercise BP: 122/72  Post Exercise BP: 118/66      Hypertension Plan  Goals  HTN Goals: Exercises regularly      Goals Met  HTN Goals Met: Follow low sodium diet;Take medication as prescribed      HTN Interventions  HTN Interventions: Diet consult;Therapist/patient discussion;Provide written material;Offer educational videos;Offer educational classes      HTN Education Completed  HTN Education Completed: Medication review;Risk factor overview;Low sodium diet      Tobacco Risk Factor: NA      Risk Factor Follow-up   Follow-up/Discharge: Reviewed all risk factors, pt states understanding     PSYCHOSOCIAL  Psychosocial Assessment: Reassessment       [facility] INGRID Q of L Summary Score: 26      PHQ-9 Total Score: 8        Psychosocial Risk Factor: Stress      Psychosocial Plan  Interventions  If PHQ-9 is >9, send letter to MD  Interventions: Offer educational videos and classes;Provide written material;Individual education and counseling      Education Completed  Education Completed: Relaxation/Coping Techniques;S/S of depression;Effects of stress on body      Goals  Goals (Next 30 days): Practicing stress management skills      Goals Met  Goals Met: Identified Support system;Oriented to stress management classes;Identify stressors;Practicing stress management " skills      Psychosocial Follow-up  Follow-up/Discharge: Reviewed stress mgmt and enc finding effective stress mgmt techniques             Patient involved in Goal setting?: Yes      Signature: _____________________________________________________________    Date: __________________    Time: __________________

## 2021-06-09 NOTE — TELEPHONE ENCOUNTER
Wellness Screening Tool  Symptom Screening:  Do you have one of the following NEW symptoms:    Fever (subjective or >100.0)?  No    A new cough?  No    Shortness of breath?  No     Chills? No     New loss of taste or smell? No     Generalized body aches? No     New persistent headache? No     New sore throat? No     Nausea, vomiting, or diarrhea?  No    Within the past 3 weeks, have you been exposed to someone with a known positive illness below:    COVID-19 (known or suspected)?  No    Chicken pox?  No    Mealses?  No    Pertussis?  No    Patient notified of visitor policy- They may have one person accompany them to their appointment, but they will need to wear a mask and will be screened upon arrival for symptoms: Yes  Pt informed to wear a mask: Yes  Pt notified if they develop any symptoms listed above, prior to their appointment, they are to call the clinic directly at [phone] for further instructions.  Yes  Patient's appointment status: Patient will be seen in clinic as scheduled on 0950 with LBF on 7/10

## 2021-06-09 NOTE — PATIENT INSTRUCTIONS - HE
Mr Priest EBONY Kevin,  I enjoyed visiting with you again today.  I am glad to hear you are doing well.  Per our conversation we will check cholesterol today, if not where it needs to be I will add Zetia 10 mg a day and recheck it thereafter.  Continue to work on weight.  Go to the aspirin every other day.  Per our conversation go ahead with a back injection but consider Effient hold for a week if need be.  I will plan on seeing you December or sooner if needed.  Suresh Lee

## 2021-06-10 NOTE — PROGRESS NOTES
ITP ASSESSMENT   Assessment Day: 240 day - Discharge    Session Number: 0    Diagnosis: Stent    Risk Stratification: High    Referring Provider: Bryson Worthington MD  EXERCISE  Exercise Assessment: Discharge    Tolerates 40 min of exercise at 3.1-3.3 METS with no sx's.  C/o occ shortness of breath with activities and walking at home.                        Goals Met  150+ day ADL'S goals met: Pt has reached 3.1 to 3.3 METs          150+ Day Progress: Pt declines to complete his last 2 sessions so will discharge from the program ( no-showed for several weeks, spoke with his wife who states she would let him know to call us with any questions about his exercise program.)     150+ day ADL'S goals met: Pt has reached 3.1 to 3.3 METs          150+ Day Progress: Pt declines to complete his last 2 sessions so will discharge from the program ( no-showed for several weeks, spoke with his wife who states she would let him know to call us with any questions about his exercise program.)     120 day ADL'S goals met: Goal met:  Tolerates 40 min of exercise at 3.2-3.4 METS,    120 Day Progress: SOB continues to limit his progress.  Will advance workloads as tolerates      90 day ADL'S goals met: Will l evaluate goals when pt resumes      60 day ADL'S goals met: STG partially met, pt is tolerating 40 min of eercise at 3-3.3MET level.    60 day Leisure goals met: LTG not met, pt has been inconsistent with going to gym for exercise, states he is too busy.    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    60 Day Progression: Pt has progressed to 3-3.3MET level on the nustep.  Pt refuses treadmill due to knee pain.      30 day ADL'S goals met: Goal met: Pt exercises 30-40 minutes 2x/week at health club     30 day Leisure goals met: Goal partially met: Pt is able to tolerate 3 METs for 40 minutes     No data recorded  30 Day Progression: Pt has reached 3 METs on Nustep. Continues to progress well.  "    Interventions    Education Material : Educational videos;Provide written material;Individual education and counseling;Offer educational classes      Education Completed  Exercise Education Completed: Cardiac Anatomy;Signs and Symptoms;Medication review;RPE;Emergency Plan;Home Exercise;Warm up/cool down;FITT Principles;BP/HR Reponse to exercise;Benefits of Exercise;End point of exercise;Stretching;Strength training              Exercise Follow-up/Discharge  Follow up/Discharge: Pt did not complete his last 2 sessions, but progressed to tolerating 3.1-3.3 METS with no sx's.   NUTRITION  Nutrition Assessment: Discharge      Nutrition Risk Factors:  Nutrition Risk Factors: Dyslipidemia;Overweight      Nutrition Plan  Interventions  Diet Consult: Completed    Other Nutrition Intervention: Therapist/Pt Discussion;Provide with Written Material    Initial Rate Your Plate Score: 44    Pre     No data recorded    Education Completed  Nutrition Education Completed: Low Saturated fat diet;Low sodium diet    Goals Met  Nutrition Goals Met: Completed Nutritional Risk Screen;Provided Rate your Plate Survey;Reviewed Dietitian schedule;Patient follows a low sodium diet;Patient has lost weight;Patient states following a low saturated fat diet      Height, Weight, and  BMI  Weight: 267 lb (121.1 kg)  Height: 5' 10\" (1.778 m)  BMI: 38.31    Pre BMI: 38.31     Nutrition Follow-up  Follow-up/Discharge: Wt was down 1# this month.         Other Risk Factors  Other Risk Factor Assessment: Discharge      HTN Risk Factor: Hypertension      Pre Exercise BP: 104/60  Post Exercise BP: 108/64      Hypertension Plan  Goals  HTN Goals: Exercises regularly      Goals Met  HTN Goals Met: Follow low sodium diet;Take medication as prescribed      HTN Interventions  HTN Interventions: Diet consult;Therapist/patient discussion;Provide written material;Offer educational videos;Offer educational classes      HTN Education Completed  HTN Education " Completed: Medication review;Risk factor overview;Low sodium diet      Tobacco Risk Factor: NA    Risk Factor Follow-up   Follow-up/Discharge: BP's are well controlled     PSYCHOSOCIAL  Psychosocial Assessment: Discharge    Unable to do follow-up surveys due to patient dropping out from program.    Psychosocial Risk Factor: Stress      Psychosocial Plan  Interventions  If PHQ-9 is >9, send letter to MD  Interventions: Offer educational videos and classes;Provide written material;Individual education and counseling      Education Completed  Education Completed: Relaxation/Coping Techniques;S/S of depression;Effects of stress on body    Goals Met  Goals Met: Identified Support system;Oriented to stress management classes;Identify stressors;Practicing stress management skills      Psychosocial Follow-up  Follow-up/Discharge: Needs continued work on managing stress/anger issues.  Education provided.               Signature: _____________________________________________________________    Date: __________________    Time: __________________See Doc Flowsheet

## 2021-06-14 NOTE — TELEPHONE ENCOUNTER

## 2021-06-15 NOTE — PATIENT INSTRUCTIONS - HE
Mr Cem BECK Gagedonald,  I enjoyed visiting with you again today.  I am glad to hear you are doing well.  Per our conversation work on diet, look up [facility] or [place] or [employer]HA Class I diet.  If planning of back surgery call me at [phone].  Let the prasugrel run out and then start aspirin daily.  Consider a sleep study.  I will plan on seeing you 1 year or sooner if needed.  Suresh Lee

## 2021-06-15 NOTE — TELEPHONE ENCOUNTER
----- Message from Esmer Cantor sent at 2/23/2021  9:04 AM CST -----  Regarding: [facility]gosh- Possible xray  Patient having echo done and would like to have a chest xray done at the same time. Please call to discuss [phone]    PC to patient, he was interested in having an Chest X-ray done to rule out any PNA or other respiratory reasons for his cough. Informed patient that order for X-ray should come from PCP. LBF is recommending an echo to follow-up on heart murmur. -kcl

## 2021-06-15 NOTE — TELEPHONE ENCOUNTER

## 2021-06-16 PROBLEM — E78.5 HYPERLIPIDEMIA: Status: ACTIVE | Noted: 2021-02-11

## 2021-06-16 PROBLEM — I10 ESSENTIAL HYPERTENSION: Status: ACTIVE | Noted: 2019-12-04

## 2021-06-16 PROBLEM — I47.20 PAROXYSMAL VT (H): Status: ACTIVE | Noted: 2020-07-10

## 2021-06-16 PROBLEM — F32.A DEPRESSIVE DISORDER: Status: ACTIVE | Noted: 2021-02-11

## 2021-06-16 PROBLEM — E66.812 CLASS 2 OBESITY DUE TO EXCESS CALORIES IN ADULT: Status: ACTIVE | Noted: 2019-12-04

## 2021-06-16 PROBLEM — I47.29 PAROXYSMAL VT (H): Status: ACTIVE | Noted: 2020-07-10

## 2021-06-16 PROBLEM — M54.40 BACK PAIN OF LUMBAR REGION WITH SCIATICA: Status: ACTIVE | Noted: 2020-07-10

## 2021-06-16 PROBLEM — E66.09 CLASS 2 OBESITY DUE TO EXCESS CALORIES IN ADULT: Status: ACTIVE | Noted: 2019-12-04

## 2021-06-16 PROBLEM — E78.00 PURE HYPERCHOLESTEROLEMIA: Status: ACTIVE | Noted: 2019-12-04

## 2021-06-16 PROBLEM — I25.10 CAD (CORONARY ARTERY DISEASE): Status: ACTIVE | Noted: 2019-12-19

## 2021-06-16 NOTE — TELEPHONE ENCOUNTER
----- Message from Andria Lee MD sent at 4/8/2021  2:35 PM CDT -----  Please inform the 75-year-old gentleman with a history of coronary artery disease that echo shows some mild heart weakness, given this, we would prefer to use low-dose beta-blocker rather than the low-dose calcium channel blocker, specifically amlodipine.  Ideally I had like to stop the amlodipine, try a trial of carvedilol 3.125 mg p.o. twice daily.  Can you please arrange to start this and maybe bring him in for blood pressure check in about a week.  Warned him side effects might be some lightheadedness and slower heartbeat but otherwise no swelling which amlodipine does.  Once he gets in a stable dose of a beta-blocker with a good blood pressure I had like to see him back.          Called Cem and updated on echocardiogram results that demonstrated a mildly decreased ejection fraction. He verbalized understanding of rationale to change over to a beta blocker from his Amlodipine. Carvedilol 3.125 mg PO two times a day ordered. Medication education provided and he can take his BP at home. He will monitor blood pressure daily and as needed should symptoms of dizziness and lightheadedness arise. He was advised to take with food and that fatigue can occur but typically subsides in a few weeks. We will check in on him in a few weeks to assess how he is tolerating the medication and subsequently arrange for [facility] follow up. -[facility] [place]

## 2021-06-16 NOTE — TELEPHONE ENCOUNTER
Patient was called for BP updates. He bought a new BP cuff and has been writing down his recordings. He states they have mostly been in the 160-170 range. He shares the following recordings:    167/76  174/101  169/94    Dr. Lee, pt reports elevated home BP using new automatic cuff. He started 3.125 mg Carvedilol two times a day - 5 days ago. He is tolerating new med fine. He is also taking hydrochlorothiazide 25 mg daily, any new recs?

## 2021-06-17 NOTE — TELEPHONE ENCOUNTER
Telephone Encounter by Viridiana Figueroa RN at 4/20/2021 12:37 PM     Author: Viridiana Figueroa RN Service: -- Author Type: Registered Nurse    Filed: 4/20/2021 12:40 PM Encounter Date: 4/9/2021 Status: Signed    : Viridiaan Figueroa RN (Registered Nurse)       Andria Lee MD Larsen, Kellie C, RN   Caller: Unspecified (1 week ago)             Thank you, I think it would be reasonable to go up to 6.25 mg of carvedilol twice a day, 2 3.125 mg tablets twice a day.  He can then phone in some blood pressures a week or so thereafter and once we find what medicine dose he settles on we can get him the prescription strength.  LF      Spoke with patient, he is agreeable to increasing dose of carvedilol. Will take two pills twice a day and continue to monitor BP recordings. Will follow-up in 1-2 weeks. -kcl

## 2021-06-17 NOTE — TELEPHONE ENCOUNTER
Telephone Encounter by Viridiana Figueroa RN at 2/8/2021 10:44 AM     Author: Viridiana Figueroa RN Service: -- Author Type: Registered Nurse    Filed: 2/8/2021 12:40 PM Encounter Date: 2/8/2021 Status: Addendum    : Viridiana Figueroa RN (Registered Nurse)    Related Notes: Original Note by Viridiana Figueroa RN (Registered Nurse) filed at 2/8/2021 12:30 PM       ----- Message from Andria Lee MD sent at 2/5/2021 11:20 AM CST -----  Please contact this man and let him know I reviewed his chart and given his shortness of breath I would like to set him up for echo to rule out major valve issues please.  Can be done within 1 to 4 months.    ------------------------    Andria Lee MD Caswell, Mallory J, RN             I know we have issues now but is it possible to look into a sleep study for this patient?  LEROY        Spoke with patient. He is hesitant to go forward with sleep med referral. He states he is a light sleeper and does not feel comfortable with the idea of being restricted on his movement at night. He will have a discussion with his wife and return call to nurse if he decided to go forward with referral. He is however, agreeable with echocardiogram. Order placed, msg sent to scheduling to arrange within 1-4 months. -kcl

## 2021-06-17 NOTE — TELEPHONE ENCOUNTER
Telephone Encounter by Viridiana Figueroa RN at 5/3/2021  1:00 PM     Author: Viridiana Figueroa RN Service: -- Author Type: Registered Nurse    Filed: 5/3/2021  1:19 PM Encounter Date: 4/9/2021 Status: Signed    : Viridiana Figueroa RN (Registered Nurse)       [facility] Caswell, Mallory J, RN   Caller: Unspecified (Today, 12:51 PM)             General phone call:     Caller: PATIENT     Primary cardiologist: DIONTE     Detailed reason for call: PATIENT WANTS TO TALK TO YOU ABOUT HIS BLOOD PRESSURE READINGS.     Best phone number: [phone]     Best time to contact: ANY     Ok to leave a detailed message? YES     Device? NO     Additional Info:          PC to Cem, he shared BP recordings and requests a refill of Carvedilol. He is currently taking 6.25 mg two times a day.     140/84  130/93  135/84  137/86  141/60  163/?  169/65    Dr. Lee, since increasing carvedilol his BP has came down some. He does every once in a while have a higher reading in the 160s. He is feeling well. Any further med changes or should we stay put on current dose of carvedilol 3.125 BID?

## 2021-06-17 NOTE — TELEPHONE ENCOUNTER
Telephone Encounter by Viridiana Figueroa RN at 5/3/2021  3:34 PM     Author: Viridiana Figueroa RN Service: -- Author Type: Registered Nurse    Filed: 5/3/2021  3:41 PM Encounter Date: 4/9/2021 Status: Signed    : Viridiana Figueroa RN (Registered Nurse)       Andria Lee MD Larsen, Kellie C, RN   Caller: Unspecified (3 weeks ago)             Please confirm current carvedilol dose, in 1 part of your note you mentioned 6.25 mg p.o. twice daily, and another part of you know you mention 3.25 mg p.o. twice daily?  In any event, blood pressures are overall unacceptable, if he is feeling well I would push carvedilol further, if he is on 3.125 go up to 6.25 twice daily.  If he is on 6.25 please have him take 1-1/2 or 9.75 twice daily.  Thank you.  LF      Patient will increase carvedilol to 9.75 mg two times a day. Confirmed with patient that he is currently taking 6.25 mg two times a day. New prescription sent. He will continue to monitor his BP and return call with recordings in a few weeks. -kcl

## 2021-06-20 NOTE — LETTER
Letter by Gemma Nguyen RN at      Author: Gemma Nguyen RN Service: -- Author Type: --    Filed:  Encounter Date: 2/11/2020 Status: (Other)         Cem Kevin  [address]  [facility] [address]             February 11, 2020         Dear Mr. Kevin,    Below are the results from your recent visit:    Resulted Orders   NM Exercise Stress Test   Result Value Ref Range    Pharmacologic Protocol  Sandeep     Test Type Treadmill     Baseline HR 74     Baseline Systolic      Baseline Diastolic BP 84     Last Stress      Last Stress Systolic      Last Stress Diastolic BP 82     Target      PERCENT HR 85%     Exercise duration (min) 4     Exercise duration (sec) 0     Estimated workload 5.8     Exercise Stage Reached  Stage 2     ST Deviation Elevation II 3.8mm     Deviation Time aVL -1.8mm     ST Elevation Amount II 5.7mm     ST Deviation Amount he aVR -3.4mm     Final Resting /76     Final Resting HR 83     Max Treadmill Speed 2.5     Max Treadmill Grade 12.0     Peak Systolic /89     Peak Diastolic /89     Max      Stress Phase Resting     Stress Resting Pt Position STANDING     Current HR 77     Current /84     Stress Phase Resting     Stress Resting Pt Position MANUAL EVENT     Current      Current /82     Stress Phase Stress     Stage Minute EXE 00:00     Exercise Stage STAGE 1     Current HR 81     Current /88     Stress Phase Stress     Stage Minute EXE 01:00     Exercise Stage STAGE 1     Current      Current /88     Stress Phase Stress     Stage Minute EXE 01:33     Exercise Stage STAGE 1     Current      Current /82     Stress Phase Stress     Stage Minute EXE 02:00     Exercise Stage STAGE 1     Current      Current /82     Stress Phase Stress     Stage Minute EXE 03:00     Exercise Stage STAGE 2     Current      Current /82     Stress Phase Stress     Stage Minute EXE  03:27     Exercise Stage STAGE 2     Current      Current /82     Stress Phase Stress     Stage Minute EXE 04:00     Exercise Stage STAGE 2     Current      Current /82     Stress Phase Stress     Stage Minute EXE 04:00     Exercise Stage STAGE 2     Current      Current /82     Stress Phase Recovery     Stage Minute REC 00:15     Exercise Stage Recovery     Current      Current /82     Stress Phase Recovery     Stage Minute REC 00:59     Exercise Stage Recovery     Current      Current /82     Stress Phase Recovery     Stage Minute REC 01:44     Exercise Stage Recovery     Current HR 86     Current /89     Stress Phase Recovery     Stage Minute REC 01:59     Exercise Stage Recovery     Current HR 86     Current /89     Stress Phase Recovery     Stage Minute REC 02:59     Exercise Stage Recovery     Current HR 73     Current /89     Stress Phase Recovery     Stage Minute REC 03:52     Exercise Stage Recovery     Current HR 75     Current /80     Stress Phase Recovery     Stage Minute REC 03:59     Exercise Stage Recovery     Current HR 75     Current /80     Stress Phase Recovery     Stage Minute REC 04:59     Exercise Stage Recovery     Current HR 80     Current /80     Stress Phase Recovery     Stage Minute REC 05:38     Exercise Stage Recovery     Current HR 77     Current /76     Stress Phase Recovery     Stage Minute REC 05:59     Exercise Stage Recovery     Current HR 82     Current /76     Stress Phase Recovery     Stage Minute REC 06:02     Exercise Stage Recovery     Current HR 83     Current /76     Calculated Percent HR 85 %    Rate Pressure Product 21,168.0     Left Ventricular EF 52 %    Narrative    ?  An exercise stress test was performed following a Sandeep protocol with   the patient exercising for 4 minutes and 0 seconds.  ?  The patient reported dyspnea during the stress test.  ?  The  patient's exercise capacity is moderately impaired.  ?  The stress electrocardiogram is negative for inducible ischemic EKG   changes.  ?  Nuclear perfusion imaging is negative for inducible myocardial ischemia   or infarction.  ?  The left ventricular ejection fraction at stress is 52%.  ?  The patient is at a low risk of future cardiac ischemic events.  ?  A prior study was conducted on 11/5/2009. Prior images were unavailable   for comparison review.       The test results show that you passed your stress test. Please follow up with Dr. Lee in May as previously discussed.          Result Notes for NM Exercise Stress Test     Notes recorded by Andria Lee MD on 2/10/2020 at 4:25 PM CST  Please inform him stress test looks good, follow-up as we discussed.  LF       Please call with questions or contact us using [employer].    Sincerely,    Gemma JAIME

## 2021-06-20 NOTE — LETTER
Letter by Gemma Nguyen RN at      Author: Gemma Nguyen RN Service: -- Author Type: --    Filed:  Encounter Date: 2/11/2020 Status: (Other)         Cem Kevin  [address]  [facility] [address]             February 11, 2020         Dear Mr. Kevin,    Below are the results from your recent visit:    Resulted Orders   Lipid Profile   Result Value Ref Range    Triglycerides 140 <=149 mg/dL    Cholesterol 204 (H) <=199 mg/dL    LDL Calculated 124 <=129 mg/dL    HDL Cholesterol 52 >=40 mg/dL    Patient Fasting > 8hrs? Yes      The test results show that your cholesterol numbers have improved. Please follow up as previously discussed.       Notes recorded by Andria Lee MD on 2/10/2020 at 2:33 PM CST   Please inform him that cholesterol is much better but still minimally elevated, normal 202 104, bad  cholesterol 124 and in past 166.  Make sure he is taking the atorvastatin 80 mg a day.  Let him know  that if he does have a subsequent event I might need to consider the PCSK9 inhibitors.   LF      Please call with questions or contact us using [employer].    Sincerely,    Gemma JAIME

## 2021-06-20 NOTE — LETTER
Letter by Lisha Perez RN at      Author: Lisha Perez RN Service: -- Author Type: --    Filed:  Encounter Date: 5/22/2020 Status: (Other)       5/22/2020        Cem Kevin  [address]  [facility] [address]    This letter provides a written record that you were tested for COVID-19 on 5/21/20.     Your result was negative.    This means that we didnt find the virus that causes COVID-19 in your sample. A test may show negative when you do actually have the virus. This can happen when the virus is in the early stages of infection, before you feel illness symptoms.    Even if you dont have symptoms, they may still appear. For safety, its very important to follow these rules.    Keep yourself away from others (self-isolation):      Stay home. Dont go to work, school or anywhere else.     Stay in your own room (and use your own bathroom), if you can.    Stay away from others in your home. No hugging, kissing or shaking hands. No visitors.    Clean high touch surfaces often (doorknobs, counters, handles, etc.). Use a household cleaning spray or wipes.    Cover your mouth and nose with a mask, tissue or washcloth to avoid spreading germs.    Wash your hands and face often with soap and water.    Stay in self-isolation until you meet ALL of the guidelines below:    1. You have had no fever for at least 72 hours (that is 3 full days of no fever without the use of medicine that reduces fevers), AND  2. other symptoms (such as cough, shortness of breath) have gotten better, AND  3. at least 10 days have passed since your symptoms first appeared.    Going back to work  Check with your employer for any guidelines to follow for going back to work.    Employers: This document serves as formal notice that your employee tested negative for COVID-19, as of the testing date shown above.    For questions regarding this letter or your Negative COVID-19 result, call [phone] between 8A to 6:30P (M-F) and 10A to  6:30P (weekends).

## 2021-06-22 ENCOUNTER — TRANSFERRED RECORDS (OUTPATIENT)
Dept: HEALTH INFORMATION MANAGEMENT | Facility: CLINIC | Age: 76
End: 2021-06-22

## 2021-06-28 NOTE — PROGRESS NOTES
Progress Notes by Clare Freeman CNP at 12/19/2019  1:30 PM     Author: Clare Freeman CNP Service: -- Author Type: Nurse Practitioner    Filed: 12/19/2019  2:06 PM Encounter Date: 12/19/2019 Status: Signed    : Clare Freeman CNP (Nurse Practitioner)             Assessment/Recommendations   1.  Coronary artery disease: PCI on December 6, 2019 with drug-eluting stent to mid LAD.  Dual antiplatelet therapy is being used with aspirin indefinitely and prasugrel for 1 year.  We discussed the importance of antiplatelet therapy and talking with his cardiologist prior to stopping these medications for any reason.      Risk factor modification and lifestyle management topics were discussed including managing comorbidities, weight loss, heart healthy diet and exercise.  He will begin cardiac rehab tomorrow.  He has a stationary bike at home which he will plan to start using.    2.  Dyslipidemia: Cem Kevin is on high intensity statin therapy with atorvastatin 80 mg daily.  His dose was increased following PCI.  LDL is 166.  I recommended rechecking cholesterol in 2 to 3 months.  We discussed a diet low in saturated fat, weight loss, and exercise along with medication for better control of cholesterol.     3.  Hypertension: His blood pressure is well controlled today taking amlodipine and hydrochlorothiazide.    4.  Probable obstructive sleep apnea: His wife notes that he snores and stops breathing at times while sleeping.  He is not interested in sleep study.  I recommended not sleeping on his back.    Ugo will follow-up with Dr. Lee on January 27.       History of Present Illness/Subjective    Cem Kevin is seen at [facility] for post coronary intervention follow up.  He has a history of hypertension and dyslipidemia.  He had increased chest pain, shortness of breath, and diaphoresis and was seen by Dr. Lee in clinic.  PCI on December 6, 2019 with  drug-eluting stent to mid LAD.  Dual antiplatelet therapy is being used with aspirin indefinitely and prasugrel for 1 year.      He has chronic shortness of breath with activity which remains unchanged.  He has brief episodes of pains in his left chest that last only a few seconds to a few minutes.  Pain is minimal rating it 1-2 out of 10.  He has no associated symptoms.  He states that these pains have decreased in frequency since PCI.  He denies fatigue, lightheadedness and lower extremity edema.      Results for orders placed during the hospital encounter of 12/06/19   Cardiac Catheterization [CATH01] 12/06/2019    Narrative   Exertional angina in obese patient with hypertension and hyperlipidemia.    Severe proximal-mid LAD disease treated with a Synergy CARLOS EDUARDO with good   angiographic results.    Mild-moderate coronary disease otherwise.    LV EDP 22 mmHg, LV EF grossly normal.    Blood loss <20 cc           Physical Examination Review of Systems   Vitals:    12/19/19 1331   BP: 128/84   Pulse: 68   Resp: 16     Body mass index is 38.74 kg/m .  Wt Readings from Last 3 Encounters:   12/19/19 (!) 270 lb (122.5 kg)   12/06/19 (!) 264 lb 14.4 oz (120.2 kg)   12/04/19 (!) 267 lb (121.1 kg)       General Appearance:     Alert, cooperative and in no acute distress.   ENT/Mouth: membranes moist, no oral lesions or bleeding gums.      EYES:  no scleral icterus, normal conjunctivae   Chest/Lungs:   lungs are clear to auscultation, no rales or wheezing, respirations unlabored   Cardiovascular:   Regular. Normal first and second heart sounds, no edema bilateral lower extremities    Abdomen:  Soft, nontender, nondistended, bowel sounds present   Extremities: no cyanosis or clubbing   Skin:  Neurologic: warm, dry.  mood and affect are appropriate, alert and oriented x3     Puncture Site: Right radial site is soft with healing bruises.  Radial pulses and Pedal pulses intact and symmetrical.  CMS intact.        General:  WNL  Eyes: WNL  Ears/Nose/Throat: WNL  Lungs: Shortness of Breath  Heart: Chest Pain, Shortness of Breath with activity  Stomach: WNL  Bladder: WNL  Muscle/Joints: Joint Pain  Skin: Poor Wound Healing  Nervous System: WNL  Mental Health: WNL     Blood: Easy Bleeding, Easy Bruising     Medical History  Surgical History Family History Social History   Past Medical History:   Diagnosis Date   ? Angina pectoris (H)     per h&p   ? Hypertension     per H&P   ? Pure hypercholesterolemia     per H&P    Past Surgical History:   Procedure Laterality Date   ? CV CORONARY ANGIOGRAM N/A 12/6/2019    Procedure: Coronary Angiogram;  Surgeon: Andria Morrison MD;  Location: [facility];  Service: Cardiology   ? CV LEFT HEART CATHETERIZATION WITH LEFT VENTRICULOGRAM N/A 12/6/2019    Procedure: Left Heart Catheterization with Left Ventriculogram;  Surgeon: Andria Morrison MD;  Location: [facility];  Service: Cardiology    No family history on file. Social History     Socioeconomic History   ? Marital status:      Spouse name: Not on file   ? Number of children: Not on file   ? Years of education: Not on file   ? Highest education level: Not on file   Occupational History   ? Not on file   Social Needs   ? Financial resource strain: Not on file   ? Food insecurity:     Worry: Not on file     Inability: Not on file   ? Transportation needs:     Medical: Not on file     Non-medical: Not on file   Tobacco Use   ? Smoking status: Former Smoker   ? Smokeless tobacco: Never Used   ? Tobacco comment: Quit 12 years ago   Substance and Sexual Activity   ? Alcohol use: Yes     Alcohol/week: 1.0 standard drinks     Types: 1 Glasses of wine per week     Comment: 2-3 glasses per month'   ? Drug use: Not on file   ? Sexual activity: Not on file   Lifestyle   ? Physical activity:     Days per week: Not on file     Minutes per session: Not on file   ? Stress: Not on file   Relationships   ? Social connections:     Talks on  phone: Not on file     Gets together: Not on file     Attends Jewish service: Not on file     Active member of club or organization: Not on file     Attends meetings of clubs or organizations: Not on file     Relationship status: Not on file   ? Intimate partner violence:     Fear of current or ex partner: Not on file     Emotionally abused: Not on file     Physically abused: Not on file     Forced sexual activity: Not on file   Other Topics Concern   ? Not on file   Social History Narrative   ? Not on file          Medications  Allergies   Current Outpatient Medications   Medication Sig Dispense Refill   ? amLODIPine (NORVASC) 10 MG tablet Take 10 mg by mouth.     ? aspirin (ASPIRIN LOW DOSE) 81 MG EC tablet Take 1 tablet (81 mg total) by mouth daily.  0   ? atorvastatin (LIPITOR) 80 MG tablet Take 1 tablet (80 mg total) by mouth at bedtime. 30 tablet 11   ? cholecalciferol, vitamin D3, 2,000 unit Tab Take 2,000 Units by mouth.     ? cyanocobalamin (VITAMIN B-12) 100 MCG tablet Take 100 mcg by mouth daily.     ? glucosam-msm-chond-hrb149-hyal (GLUCOS-CHOND-MSM, WITH ANTIOX,) 500-500-66.7 mg Tab Take 1 tablet by mouth.     ? hydroCHLOROthiazide (HYDRODIURIL) 25 MG tablet Take 25 mg by mouth.     ? meloxicam (MOBIC) 7.5 MG tablet Take 7.5 mg by mouth 2 (two) times a day.      ? multivitamin (ONE A DAY) per tablet Take 1 tablet by mouth.     ? prasugrel (EFFIENT) 10 mg Tab tablet Take 1 tablet (10 mg total) by mouth daily. For 12 months 30 tablet 11   ? venlafaxine (EFFEXOR) 75 MG tablet Take 75 mg by mouth daily.     ? nitroglycerin (NITROSTAT) 0.6 MG SL tablet Place 0.6 mg under the tongue every 5 (five) minutes as needed for chest pain.       No current facility-administered medications for this visit.     Allergies   Allergen Reactions   ? Vasotec [Enalapril Maleate] Swelling     IV form only per patient report         Lab Results    Chemistry/lipid CBC Cardiac Enzymes/BNP/TSH/INR   Lab Results   Component  Value Date    CHOL 243 (H) 12/06/2019    HDL 47 12/06/2019    LDLCALC 166 (H) 12/06/2019    TRIG 149 12/06/2019    CREATININE 1.11 12/06/2019    BUN 24 12/06/2019    K 4.2 12/06/2019     12/06/2019     12/06/2019    CO2 29 12/06/2019    Lab Results   Component Value Date    WBC 5.7 12/06/2019    HGB 14.7 12/06/2019    HCT 45.2 12/06/2019    MCV 86 12/06/2019     12/06/2019    Lab Results   Component Value Date    BNP <10 12/20/2016    TSH 1.71 07/17/2018

## 2021-06-28 NOTE — PROGRESS NOTES
Progress Notes by Andria Lee MD at 1/27/2020  8:50 AM     Author: Andria Lee MD Service: -- Author Type: Physician    Filed: 1/27/2020  9:26 AM Encounter Date: 1/27/2020 Status: Signed    : Andria Lee MD (Physician)           [facility] Follow-up Note    Assessment & Plan        1. Coronary artery disease involving native heart without angina pectoris, unspecified vessel or lesion type -due to what sounds like accelerated angina he underwent angiography December 2019 showing left main distal 25% stenosis, proximal LAD 20% stenosis with a mid 70% lesion which was intervened upon, first diagonal 25% stenosis.  Circumflex had an ostial 40% stenosis with the second obtuse marginal artery 40% stenosis, right coronary artery proximal 20%, mid 20% and distal 20%.  Given recent episode of chest discomfort, although atypical and I reassured him, will arrange for exercise stress nuclear.   2. Pure hypercholesterolemia -now on atorvastatin 80 mg and I will recheck fasting lipids in 2 weeks.   3. Essential hypertension -under good control currently.   4. Class 2 obesity due to excess calories in adult -work on weight loss, does carry the diagnosis of sleep apnea but not under treatment because it is mild.     Plan  1.  Exercise stress nuclear and if medium or large sized area of ischemia angiography.  2.  If chest discomfort persists and uses nitros frequently consider Imdur.  3.  Weight loss.  4.  Consider repeat sleep apnea evaluation.  5.  Check fasting lipids in 2 weeks.  6.  Follow-up with me in about 3 to 4 months.    Subjective  CC: 74-year-old white gentleman here for follow-up today.  Since have seen him he went to the Cath Lab and had intervention on his LAD.  Yesterday, while watching a basketball game of his grandsons, he had chest stabbing, took a nitro, stood up and then became lightheaded.  There might of been a syncopal spell.  Otherwise in general he has  "various chest discomforts which are more stabbing and move around in his chest, no significant angina, effort related angina, shortness of breath, PND, orthopnea, other syncopal spells or peripheral edema.    Medications  Current Outpatient Medications   Medication Sig   ? amLODIPine (NORVASC) 10 MG tablet Take 10 mg by mouth daily.    ? aspirin (ASPIRIN LOW DOSE) 81 MG EC tablet Take 1 tablet (81 mg total) by mouth daily.   ? atorvastatin (LIPITOR) 80 MG tablet Take 1 tablet (80 mg total) by mouth at bedtime.   ? cholecalciferol, vitamin D3, 2,000 unit Tab Take 2,000 Units by mouth.   ? cyanocobalamin (VITAMIN B-12) 100 MCG tablet Take 100 mcg by mouth daily.   ? glucosam-msm-chond-hrb149-hyal (GLUCOS-CHOND-MSM, WITH ANTIOX,) 500-500-66.7 mg Tab Take 1-2 tablets by mouth.    ? hydroCHLOROthiazide (HYDRODIURIL) 25 MG tablet Take 25 mg by mouth daily.    ? meloxicam (MOBIC) 7.5 MG tablet Take 7.5 mg by mouth 2 (two) times a day.    ? multivitamin (ONE A DAY) per tablet Take 1 tablet by mouth daily.    ? nitroglycerin (NITROSTAT) 0.6 MG SL tablet Place 0.6 mg under the tongue every 5 (five) minutes as needed for chest pain.   ? prasugrel (EFFIENT) 10 mg Tab tablet Take 1 tablet (10 mg total) by mouth daily. For 12 months   ? venlafaxine (EFFEXOR) 75 MG tablet Take 75 mg by mouth daily.       Objective  /84 (Patient Site: Right Arm, Patient Position: Sitting, Cuff Size: Adult Large)   Pulse 69   Resp 16   Ht 5' 10\" (1.778 m)   Wt (!) 269 lb (122 kg)   BMI 38.60 kg/m      General Appearance:    Alert, cooperative, no distress, appears stated age   Head:    Normocephalic, without obvious abnormality, atraumatic   Throat:   Lips, mucosa, and tongue normal; teeth and gums normal   Neck:   Supple, symmetrical, trachea midline, no adenopathy;        thyroid:  No enlargement/tenderness/nodules; no carotid    bruit or JVD   Back:     Symmetric, no curvature, ROM normal, no CVA tenderness   Lungs:     Clear to " auscultation bilaterally, respirations unlabored   Chest wall:    No tenderness or deformity   Heart:    Regular rate and rhythm, S1 and S2 normal, no murmur, rub   or gallop   Abdomen:     Soft, non-tender, bowel sounds active all four quadrants,     no masses, no organomegaly   Extremities:   Normal, atraumatic, no cyanosis or edema   Pulses:   2+ and symmetric all extremities   Skin:   Skin color, texture, turgor normal, no rashes or lesions     Results    Lab Results personally reviewed   Lab Results   Component Value Date    CHOL 243 (H) 12/06/2019    CHOL 238 (H) 07/18/2019     Lab Results   Component Value Date    HDL 47 12/06/2019    HDL 50 07/18/2019     Lab Results   Component Value Date    LDLCALC 166 (H) 12/06/2019    LDLCALC 135 (H) 07/18/2019     Lab Results   Component Value Date    TRIG 149 12/06/2019    TRIG 266 (H) 07/18/2019     Lab Results   Component Value Date    WBC 8.6 01/25/2020    HGB 14.8 01/25/2020    HCT 45.0 01/25/2020     01/25/2020     Lab Results   Component Value Date    CREATININE 1.06 01/25/2020    BUN 24 01/25/2020     01/25/2020    K 3.5 01/25/2020    CO2 26 01/25/2020     Review of Systems:   General: WNL  Eyes: WNL  Ears/Nose/Throat: Nosebleeds  Lungs: WNL  Heart: Chest Pain  Stomach: WNL  Bladder: WNL  Muscle/Joints: Joint Pain, Muscle Weakness, Muscle Pain  Skin: WNL  Nervous System: Dizziness, Loss of Balance  Mental Health: Depression, Anxiety     Blood: Easy Bleeding, Easy Bruising

## 2021-06-28 NOTE — PROGRESS NOTES
Progress Notes by Andria Lee MD at 12/4/2019  9:10 AM     Author: Andria Lee MD Service: -- Author Type: Physician    Filed: 12/4/2019 10:04 AM Encounter Date: 12/4/2019 Status: Signed    : Andria Lee MD (Physician)         [facility] Heart Care Office Consult     Assessment:     1. Angina pectoris, crescendo (H) -sounds like new onset crescendo angina with a discomfort in the chest and associated shortness of breath and diaphoresis.  Sounds like it is worsening with an episode at rest last week.  Given risk factors assume obstructive epicardial coronary artery disease and given this we will arrange for coronary angiography and possible intervention.  Risks, benefits, consultation and alternatives were briefly discussed with him and he is willing to proceed as is his wife.   2. Essential hypertension -under good control on Norvasc.   3. Pure hypercholesterolemia -unacceptable levels of cholesterol and LDL.  Given this we will ask him to increase his atorvastatin to 80 mg.  Will recheck thereafter.   4. Class 2 obesity due to excess calories in adult -work on weight loss, will need sleep apnea evaluation as well.      Plan:   1.  Weight loss.  2.  Angiography with possible intervention.  3.  Increase atorvastatin 80 mg a day.  4.  Consider outpatient sleep apnea evaluation.  5.  Follow-up with me thereafter.    History of Present Illness:   Thank you for asking the [facility] Heart Care team to see Cem Kevin a 74 y.o. male  in consultation  to evaluate chest discomfort.   The patient has been in his usual state of health which includes a discomfort in the left side of his chest that would come on with activity and go away with rest.  This is been going on for about 10 to 15 years and he believes he had a stress test in the past, cannot recall where, that was normal.  Over the last months to year the discomfort is increased, lasting slightly longer up to 5 to 10 minutes with  activity, more intense,, now with lesser activity, and now associated with shortness of breath and diaphoresis.  He tells me he was able to mow the lawn with a push mower without stopping last year however this season he had to stop several times.  On Thanksgiving day, he had a similar episode that came on at rest while at dinner.  It resolved within about 5 to 10 minutes.  Primary obtained ECG suggesting old anterior Q wave MI type pattern and is here to discuss further.    Past Medical History:   Pure hypercholesterolemia  Obesity  L-spine issues  Benign essential hypertension    Past history is negative for cancer, tuberculosis, diabetes mellitus, myocardial infarction,  rheumatic fever, cerebrovascular accident, chronic kidney disease, peptic ulcer disease, chronic obstructive pulmonary disease, or thyroid disorder.    Past Surgical History:   No past surgical history on file.    Family History:   Family history positive hypertension but negative for coronary artery disease.    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  He lives independently with his wife.  Reports that he has quit smoking, having smoked 1 to 2 packs a day for about 45 years quitting 13 years ago. He does not have any smokeless tobacco history on file. The primary care physician is Bryson Worthington MD    Meds:   Scheduled Meds:  Current Outpatient Medications   Medication Sig Dispense Refill   ? amLODIPine (NORVASC) 10 MG tablet Take 10 mg by mouth.     ? aspirin 325 MG tablet Take 325 mg by mouth.     ? atorvastatin (LIPITOR) 40 MG tablet Take 40 mg by mouth.     ? cetirizine (ZYRTEC) 10 MG tablet Take 10 mg by mouth.     ? cholecalciferol, vitamin D3, 2,000 unit Tab Take 2,000 Units by mouth.     ? cyanocobalamin (VITAMIN B-12) 100 MCG tablet Take 100 mcg by mouth daily.     ? FLUoxetine (PROZAC) 20 MG capsule Take 20 mg by mouth.     ?  "glucosam-msm-chond-hrb149-hyal (GLUCOS-CHOND-MSM, WITH ANTIOX,) 500-500-66.7 mg Tab Take 1 tablet by mouth.     ? hydroCHLOROthiazide (HYDRODIURIL) 25 MG tablet Take 25 mg by mouth.     ? meloxicam (MOBIC) 7.5 MG tablet Take 7.5 mg by mouth daily.     ? moxifloxacin (VIGAMOX) 0.5 % ophthalmic solution Apply 1 drop to eye.     ? multivitamin (ONE A DAY) per tablet Take 1 tablet by mouth.     ? nepafenac (NEVANAC) 0.1 % ophthalmic suspension Apply 1 drop to eye.     ? nitroglycerin (NITROSTAT) 0.6 MG SL tablet Place 0.6 mg under the tongue every 5 (five) minutes as needed for chest pain.     ? prednisoLONE acetate (PRED-FORTE) 1 % ophthalmic suspension Apply 1 drop to eye.       No current facility-administered medications for this visit.        PRN Meds:.    Allergies:   Patient has no known allergies.    Objective:      Physical Exam  (!) 267 lb (121.1 kg)  5' 10\" (1.778 m)  Body mass index is 38.31 kg/m .  /82 (Patient Site: Left Arm, Patient Position: Sitting, Cuff Size: Adult Regular)   Pulse (!) 59   Resp 12   Ht 5' 10\" (1.778 m)   Wt (!) 267 lb (121.1 kg)   BMI 38.31 kg/m      General Appearance:   Alert, cooperative and in no acute distress.   HEENT:  No scleral icterus; the mucous membranes were pink and moist.   Neck: JVP normal. No thyromegaly. No HJR   Chest: The spine was straight. The chest was symmetric.   Lungs:   Respirations unlabored; the lungs are clear to auscultation.   Cardiovascular:   S1 and S2 without murmur, clicks or rubs. Brachial, radial, carotid and posterior tibial pulses are intact and symetrical.  No carotid bruits noted   Abdomen:  No organomegaly, masses, bruits, or tenderness. Bowels sounds are present   Extremities: No cyanosis, clubbing, or edema.   Skin: No xanthelasma.   Neurologic: Mood and affect are appropriate.         Lab Reviewed Personally by myself  Lab Results   Component Value Date     07/18/2019    K 3.9 07/18/2019     07/18/2019    CO2 27 " 07/18/2019    BUN 19 07/18/2019    CREATININE 0.98 07/18/2019    CALCIUM 9.6 07/18/2019     Lab Results   Component Value Date    WBC 5.7 08/07/2017    HGB 15.1 08/07/2017    HCT 46.1 08/07/2017    MCV 85 08/07/2017     08/07/2017     Lab Results   Component Value Date    CHOL 238 (H) 07/18/2019    TRIG 266 (H) 07/18/2019    HDL 50 07/18/2019     Lab Results   Component Value Date    BNP <10 12/20/2016       ECG personally reviewed by myself shows sinus rhythm, left anterior hemiblock, left ventricular perjury by voltage, old anterior Q wave MI type pattern.     Review of Systems:     Review of Systems:   General: WNL  Eyes: WNL  Ears/Nose/Throat: WNL  Lungs: Shortness of Breath  Heart: Chest Pain, Shortness of Breath with activity, Leg Swelling  Stomach: WNL  Bladder: Frequent Urination at Night  Muscle/Joints: Joint Pain  Skin: WNL  Nervous System: WNL  Mental Health: Depression     Blood: WNL

## 2021-06-29 NOTE — PROGRESS NOTES
Progress Notes by Andria Lee MD at 7/10/2020  9:50 AM     Author: Andria Lee MD Service: -- Author Type: Physician    Filed: 7/10/2020 10:23 AM Encounter Date: 7/10/2020 Status: Signed    : Andria Lee MD (Physician)           [facility]  Heart Care Clinic Follow-up Note    Assessment & Plan        1. Coronary artery disease involving native heart without angina pectoris, unspecified vessel or lesion type -due to what sounds like accelerated angina he underwent angiography December 2019 showing left main distal 25% stenosis, proximal LAD 20% stenosis with a mid 70% lesion which was intervened upon, first diagonal 25% stenosis. Circumflex had an ostial 40% stenosis with the second obtuse marginal artery 40% stenosis, right coronary artery proximal 20%, mid 20% and distal 20%.    Still with atypical episodes of chest discomfort, although normal exercise stress nuclear.   2. Pure hypercholesterolemia -LDL elevated at 124 down from 166 on atorvastatin 80 mg.  Will recheck today and if not under 100 add Zetia 10 mg.  If unable to get good control thereafter will consider PCSK9 inhibitor.  We will also consider research trial since he never had an event.   3. Essential hypertension -under good control currently on HCTZ as well as amlodipine.   4. Class 2 obesity due to excess calories in adult -weight loss.   5. Back pain of lumbar region with sciatica -no issues with back injection although may need to hold Effient for a week, defer to back specialist.   6. Paroxysmal VT (H) -noted on telemetry during cardiac rehab, most likely secondary to hypokalemia given HCTZ.  I have encouraged potassium rich foods.  At this point time he has prescription for metoprolol but is not taking it.     Plan  1.  Check fasting lipid profile today, if LDL not less than 100 add Zetia 10 mg a day, consider PCSK9 inhibitor.  2.  Weight loss.  3.  Coated aspirin every other day given bruising.  4.  Potassium  "rich foods such as bananas and citrus juices, if recurrent VT consider potassium supplement.  5.  Follow-up with me in December which will be a year out from stent.    Subjective  CC: 75-year-old white gentleman being seen in follow-up today.  Since I have seen him he still has vague chest discomfort, twinges lasting 5 to 30 seconds below his left hemidiaphragm.  He does have significant discomfort involving his left lower extremity from sciatica.  No significant chest discomfort, palpitations, PND, orthopnea or peripheral edema.  He does complain of excessive bruising.    Medications  Current Outpatient Medications   Medication Sig   ? amLODIPine (NORVASC) 10 MG tablet Take 0.5 tablets (5 mg total) by mouth daily.   ? aspirin (ASPIRIN LOW DOSE) 81 MG EC tablet Take 1 tablet (81 mg total) by mouth daily.   ? atorvastatin (LIPITOR) 80 MG tablet Take 1 tablet (80 mg total) by mouth at bedtime.   ? cholecalciferol, vitamin D3, 2,000 unit Tab Take 2,000 Units by mouth.   ? cyanocobalamin (VITAMIN B-12) 100 MCG tablet Take 100 mcg by mouth daily.   ? glucosam-msm-chond-hrb149-hyal (GLUCOS-CHOND-MSM, WITH ANTIOX,) 500-500-66.7 mg Tab Take 1-2 tablets by mouth.    ? hydroCHLOROthiazide (HYDRODIURIL) 25 MG tablet Take 25 mg by mouth daily.    ? meloxicam (MOBIC) 7.5 MG tablet Take 7.5 mg by mouth 2 (two) times a day.    ? multivitamin (ONE A DAY) per tablet Take 1 tablet by mouth daily.    ? nitroglycerin (NITROSTAT) 0.6 MG SL tablet Place 0.6 mg under the tongue every 5 (five) minutes as needed for chest pain.   ? prasugrel (EFFIENT) 10 mg Tab tablet Take 1 tablet (10 mg total) by mouth daily. For 12 months   ? venlafaxine (EFFEXOR) 75 MG tablet Take 75 mg by mouth daily.       Objective  /70 (Patient Site: Left Arm, Patient Position: Sitting, Cuff Size: Adult Large)   Pulse 64   Resp 16   Ht 5' 10\" (1.778 m)   Wt (!) 271 lb 6.4 oz (123.1 kg)   BMI 38.94 kg/m      General Appearance:    Alert, cooperative, no " distress, appears stated age   Head:    Normocephalic, without obvious abnormality, atraumatic   Throat:   Lips, mucosa, and tongue normal; teeth and gums normal   Neck:   Supple, symmetrical, trachea midline, no adenopathy;        thyroid:  No enlargement/tenderness/nodules; no carotid    bruit or JVD   Back:     Symmetric, no curvature, ROM normal, no CVA tenderness   Lungs:     Clear to auscultation bilaterally, respirations unlabored   Chest wall:    No tenderness or deformity   Heart:    Regular rate and rhythm, S1 and S2 normal, no murmur, rub   or gallop   Abdomen:     Soft, non-tender, bowel sounds active all four quadrants,     no masses, no organomegaly   Extremities:   Normal, atraumatic, no cyanosis or edema   Pulses:   2+ and symmetric all extremities   Skin:   Skin color, texture, turgor normal, no rashes or lesions     Results    Lab Results personally reviewed   Lab Results   Component Value Date    CHOL 204 (H) 02/10/2020    CHOL 243 (H) 12/06/2019     Lab Results   Component Value Date    HDL 52 02/10/2020    HDL 47 12/06/2019     Lab Results   Component Value Date    LDLCALC 124 02/10/2020    LDLCALC 166 (H) 12/06/2019     Lab Results   Component Value Date    TRIG 140 02/10/2020    TRIG 149 12/06/2019     Lab Results   Component Value Date    WBC 8.9 05/21/2020    HGB 16.0 05/21/2020    HCT 48.0 05/21/2020     05/21/2020     Lab Results   Component Value Date    CREATININE 1.07 05/21/2020    BUN 19 05/21/2020     05/21/2020    K 3.6 05/21/2020    CO2 24 05/21/2020     Review of Systems:   General: Weight Gain  Eyes: WNL  Ears/Nose/Throat: WNL  Lungs: Shortness of Breath  Heart: WNL  Stomach: WNL  Bladder: WNL  Muscle/Joints: Joint Pain, Muscle Weakness  Skin: Poor Wound Healing  Nervous System: WNL  Mental Health: Depression     Blood: Easy Bleeding, Easy Bruising

## 2021-06-30 NOTE — PROGRESS NOTES
Progress Notes by Malachi Aldana at 3/2/2021 10:00 AM     Author: Malachi Aldana Service: -- Author Type: Patient Access    Filed: 3/2/2021 11:34 AM Encounter Date: 3/2/2021 Status: Signed    : Malachi Aldana (Patient Access)       Parrot Study - Day 5 Call      Removing Patch 1    Study Purpose:   Atrial Fibrillation Algorithm Clinical Validation Study, prospective, multi-center, non significant risk     Subject was called on 02-Mar-2021 for their Day 5 phone call to instructed subjects on removing device patch 1 and ship it back to sponsor and instruction on putting on device 2 patch.     Did the subject have a new reportable adverse events since last visit: No  If yes, please generate adverse event report for PI to cosign    Plan: Subject is schedule for a Day 13 phone call on 10-Mar-2021 at 1:00PM     Malachi Aldana

## 2021-06-30 NOTE — PROGRESS NOTES
Progress Notes by Yamel Arellano RN at 2/18/2021 10:00 AM     Author: Yamel Arellano RN Service: -- Author Type: Exercise Phys Spec    Filed: 2/18/2021 11:52 AM Encounter Date: 2/18/2021 Status: Signed    : Yamel Arellano RN (Registered Nurse)       [facility] Study Consent Note    Study Purpose: Atrial Fibrillation Algorithm Clinical Validation Study, prospective, multi-center, non-significant risk     Cem Kevin was called today, 02/18/21, to discuss participation in the Parrot Study. The consent form version 3.0 was reviewed with subject. Subject was provided with a virtual copy of the consent form via [employer] e-consenting software.    The consent discuss included:    Study description and purpose     Qualifications for participation    Screening visit    Study procedures and follow up visits    Participant responsibilities     Study restrictions    Length of study    Study data    Potential risks and discomforts    New information    Potential benefits of participation    Incidental findings    Alternate therapies    Compensation for participation    Cost/Compensation for research related injury    Study Funding    Withdrawal participation    Confidentiality     Study contacts    Legal right    The subject was provided time to review the consent form and consider participation his questions were answered to his satisfaction. The patient has voluntarily agreed to participate in the above noted study.     The consent form version 3.0 and HIPPA form version 3.0 was signed 02/18/21 via [employer] e-consenting software. A copy of the signed consent form is delivered to patient via email from [employer] e-consent software. A copy will be placed in subject's medical record.     Past Medical History:   Diagnosis Date   ? Depression 2018   ? Hypertension  1967   ? CAD 2019   ? Angiogram 12/6/2019   ? Pure hypercholesterolemia  1995       [unfilled]      Current Outpatient Medications:    ?  amLODIPine (NORVASC) 10 MG tablet, Take 0.5 tablets (5 mg total) by mouth daily., Disp: 45 tablet, Rfl: 0  ?  atorvastatin (LIPITOR) 80 MG tablet, TAKE 1 TABLET AT BEDTIME, Disp: 90 tablet, Rfl: 0  ?  celecoxib (CELEBREX) 200 MG capsule, Take 1 capsule by mouth daily., Disp: , Rfl:    ?  ezetimibe (ZETIA) 10 mg tablet, Take 1 tablet (10 mg total) by mouth daily., Disp: 90 tablet, Rfl: 2  ?  hydroCHLOROthiazide (HYDRODIURIL) 25 MG tablet, Take 25 mg by mouth daily. , Disp: , Rfl:   ?  venlafaxine (EFFEXOR) 75 MG tablet, Take 75 mg by mouth daily., Disp: , Rfl:     Allergies   Allergen Reactions   ? Vasotec [Enalapril Maleate] Swelling     IV form only per patient report       Yamel Arellano, EPS, RN

## 2021-06-30 NOTE — PROGRESS NOTES
Progress Notes by Yamel Arellano RN at 2/18/2021 10:00 AM     Author: Yamel Arellano RN Service: -- Author Type: Exercise Phys Spec    Filed: 2/18/2021  1:06 PM Encounter Date: 2/18/2021 Status: Signed    : Ham Pinto MD (Physician)    Related Notes: Original Note by Yamel Arellano RN (Registered Nurse) filed at 2/18/2021 11:52 AM         Parrot Study Inclusion / Exclusion Criteria    Study Purpose: Atrial Fibrillation Algorithm Clinical Validation Study, prospective, multi-center, non significant risk     Protocol Version V 3.0 - 10 December 2020    Subject Cohort: 2    Inclusion Criteria-all must be Yes  Yes/No Cohort Subject must meet all inclusion criteria:    Yes   All 1. Able to read, understand, and provide written informed consent.   Yes All 2. Willing and able to participate in the study procedures as described in the consent form.    Yes All 3. Be 22 years of age and older   Yes    All 4. Able to communicate effectively with and follow instructions from study staff   Yes All 5. Able to wear the wrist device for duration of study participation     NA Cohort 1 6. Have no known medical history of AF    Yes:  D Cohort 2 7. have no known medical history of AF and active diagnosis of at least one of the following arrhythmias within the past 2 years:   A. Frequent PACs, defined as at least 1% of total beats of atrial ectopic beats by 24-48 hour Holter, ambulatory ECG monitor, or implantable loop recorder)   B. Frequent PVCs, defined as at least 1% of total beats of ventricular ectopic beats by 24-48 hour Holter, ambulatory ECG monitor, or implantable loop recorder   C. SVT, which will include atrial tachycardia, atrioventricular brijesh re-entrant tachycardia, atrioventricular re-entrant tachycardia by 12-lead ECG or 24-48 hour Holter, ambulatory ECG monitor, or implantable loop recorder   D. NSVT, defined as three or more consecutive ventricular beats at a rate of at least 100 beats per  minute and lasting no more than 30 seconds, by 12-lead ECG or 24-48 hour Holter, ambulatory ECG monitor, or implantable loop recorder     NA Cohort 3 and 4 8. have a known diagnosis of AF at the time of screening (confirmed by electronic medical record (EMR) or self-report) and have had a recent episode of AF, or confirmed AF on ECG, in the past 12 months     NA Cohort 4 9. have a known diagnosis of permanent AF at the time of screening (confirmed by EMR or self-report) and have had a recent episode of AF, or confirmed AF on ECG, in the past 12 months    Yes NA 10. Meet additional binning based on demographics             Exclusion Criteria-all must be no  Yes/No Criteria # Subject must not meet any exclusion criteria:    No All 1. Physical disability that prevents safe and adequate testing.   No All 2. Mental impairment resulting in limited ability to cooperate.   No All 3. Known uncontrolled medical conditions, Such as (but not limited to) significant anemia, important electrolyte imbalance and untreated or uncontrolled thyroid disease   No All 4. Open wound(s) on the wrist and / or forearm   No All 5. Tattoos, large moles, or scars on the wrist at the wrist device location    No All 6. Skin conditions on either wrist that would preclude subject from wearing a wristband on either wrist    No All 7. Known allergy or sensitivity to medical adhesives, isopropyl alcohol, wristbands, or ECG patch.   No All 8. Medical history or physical assessment finding that makes the subjects inappropriate for participation according to investigator(s)   No All 9. Participation in a previous study that used a wrist-worn sensor device with a simultaneous ECG reference patch    No All 10. Implantable cardiac devices such as a Pacemaker or Implantable Cardioverter defibrillator    No All 11. Clinically significant hand tremors, as judged by the investigator    No All 12. Acute illness including COVID and other respiratory illnesses     No Cohort 3 and 4 13. Subject with known history of AF on a rhythm control medications with history of complete AF rhythm control (I.e history of zero AF burden) will be excluded from Cohort 3 and 4.  (Subjects enrolled into Cohort 2 can be on rate control medications)      Subject has met all inclusion criteria and no exclusion criteria have been met.     Subject is ready to fully enrolled in the Parrot study.    Yamel Arellano, EPS, RN

## 2021-06-30 NOTE — PROGRESS NOTES
Progress Notes by Malinda Riggs CNP at 2/18/2021 10:00 AM     Author: Malinda Riggs CNP Service: -- Author Type: Nurse Practitioner    Filed: 2/18/2021 11:52 AM Encounter Date: 2/18/2021 Status: Signed    : Malinda Riggs CNP (Nurse Practitioner)           Study Purpose: Atrial Fibrillation Algorithm Clinical Validation Study, prospective, multi-center, non significant risk       Physical Examination performed via live video encounter   General Appearance:   Alert, well appearing,no distress, normal body habitus, upright.   HENT/Mouth: Atraumatic normocephalic membranes moist, mucous membranes pink and moist  No nasal discharge or   bleeding gums.    EYES:  no scleral icterus, normal conjunctivae   Neck: no evidence of thyromegaly.  Supple.    Chest/Lungs:   No audible wheezing equal chest wall expansion. Non labored breathing.  No cough.   Cardiovascular:   No evidence of elevated jugular venous pressure.      Abdomen:  no evidence of abdominal distention. No observe juandice.     Extremities: no cyanosis or clubbing noted.   No visible edema bilaterally   Skin:   skin dry, no visible ecchymosis  No markings of the bilateral wrists.   Neurologic: Mood affect appropriate to place time and person   Normal arm motion bilateral, no tremors.  No evidence of focal defect.              COVID: No symptoms, chills, shortness of breath, or difficulty breathing, muscle or body aches, headache, loss of taste or smell, sore throat, runny nose, congestion, nausea, vomiting or diarrhea.according to the [place] Department of Health and Human Services based on the CARES Act.       Malinda Riggs CNP

## 2021-06-30 NOTE — PROGRESS NOTES
Progress Notes by Andria Lee MD at 2/5/2021 10:30 AM     Author: Andria Lee MD Service: -- Author Type: Physician    Filed: 2/5/2021 11:21 AM Encounter Date: 2/5/2021 Status: Signed    : Andria Lee MD (Physician)           [facility]  Heart Care Clinic Follow-up Note    Assessment & Plan        1. Coronary artery disease involving native heart without angina pectoris, unspecified vessel or lesion type- due to what sounds like accelerated angina he underwent angiography December 2019 showing left main distal 25% stenosis, proximal LAD 20% stenosis with a mid 70% lesion which was intervened upon, first diagonal 25% stenosis. Circumflex had an ostial 40% stenosis with the second obtuse marginal artery 40% stenosis, right coronary artery proximal 20%, mid 20% and distal 20%.    Still with atypical episodes of chest discomfort, although normal exercise stress nuclear.   2. Essential hypertension-under good control currently.   3. Pure hypercholesterolemia-now on atorvastatin 80 mg as well as Zetia 10 mg with a total cholesterol 164 LDL of 78 although triglycerides elevated 241.  Did discuss starting Vascepa and he is a little hesitant and will work on diet.  If he does have recurrent event would need to consider starting that.   4. Class 2 obesity due to excess calories in adult-weight loss, consider sleep study given shortness of breath.   5. Back pain of lumbar region with sciatica-no cardiac reasons why he cannot pursue surgery here, if and when this is scheduled most likely will get pharmacological stress nuclear.   6.  Murmur, will check echo.    Plan  1.  Weight loss-discussed [place], Mediterranean, AHA type diet.  2.  If he had back surgery we will schedule pharmacological stress nuclear.  3.  Blood pressure goal run out and then go back to aspirin 81 mg a day.  4.  Given obesity work on weight loss and consider sleep study.  5.  Follow-up in 1 year or sooner if  "needed.  6.  Echo for murmur.    Subjective  CC: 74-year-old white gentleman being seen in follow-up.  He is about a year out from his stent.  Still has shortness of breath on minimal activity, really unchanged despite normal stress test.  Does complain of significant back discomfort limiting his ability.  Does not have any chest discomfort, palpitations, PND, orthopnea or peripheral edema.    Medications  Current Outpatient Medications   Medication Sig   ? amLODIPine (NORVASC) 10 MG tablet Take 0.5 tablets (5 mg total) by mouth daily.   ? aspirin (ASPIRIN LOW DOSE) 81 MG EC tablet Take 1 tablet (81 mg total) by mouth daily.   ? atorvastatin (LIPITOR) 80 MG tablet TAKE 1 TABLET AT BEDTIME   ? celecoxib (CELEBREX) 200 MG capsule Take 1 capsule by mouth daily.   ? cholecalciferol, vitamin D3, 2,000 unit Tab Take 2,000 Units by mouth daily.    ? ezetimibe (ZETIA) 10 mg tablet Take 1 tablet (10 mg total) by mouth daily.   ? glucosam-msm-chond-hrb149-hyal (GLUCOS-CHOND-MSM, WITH ANTIOX,) 500-500-66.7 mg Tab Take 1-2 tablets by mouth.    ? hydroCHLOROthiazide (HYDRODIURIL) 25 MG tablet Take 25 mg by mouth daily.    ? multivitamin (ONE A DAY) per tablet Take 1 tablet by mouth daily.    ? nitroglycerin (NITROSTAT) 0.6 MG SL tablet Place 0.6 mg under the tongue every 5 (five) minutes as needed for chest pain.   ? venlafaxine (EFFEXOR) 75 MG tablet Take 75 mg by mouth daily.   ? venlafaxine (EFFEXOR-XR) 37.5 MG 24 hr capsule Take 1 capsule by mouth daily as needed. Take it with the 75 mg capsule.       Objective  /76 (Patient Site: Right Arm, Patient Position: Sitting, Cuff Size: Adult Large)   Pulse 63   Resp 14   Ht 5' 10\" (1.778 m)   Wt (!) 278 lb (126.1 kg) Comment: with shoes.  SpO2 91%   BMI 39.89 kg/m      General Appearance:    Alert, cooperative, no distress, appears stated age   Head:    Normocephalic, without obvious abnormality, atraumatic   Throat:   Lips, mucosa, and tongue normal; teeth and gums " normal   Neck:   Supple, symmetrical, trachea midline, no adenopathy;        thyroid:  No enlargement/tenderness/nodules; no carotid    bruit or JVD   Back:     Symmetric, no curvature, ROM normal, no CVA tenderness   Lungs:     Clear to auscultation bilaterally, respirations unlabored   Chest wall:    No tenderness or deformity   Heart:    Regular rate and rhythm, S1 and S2 normal, 1/6 systolic ejection murmur, no rub   or gallop   Abdomen:     Soft, non-tender, bowel sounds active all four quadrants,     no masses, no organomegaly   Extremities:   Normal, atraumatic, no cyanosis or edema   Pulses:   2+ and symmetric all extremities   Skin:   Skin color, texture, turgor normal, no rashes or lesions     Results    Lab Results personally reviewed   Lab Results   Component Value Date    CHOL 164 01/29/2021    CHOL 209 (H) 07/10/2020     Lab Results   Component Value Date    HDL 38 (L) 01/29/2021    HDL 45 07/10/2020     Lab Results   Component Value Date    LDLCALC 78 01/29/2021    LDLCALC 112 07/10/2020     Lab Results   Component Value Date    TRIG 241 (H) 01/29/2021    TRIG 258 (H) 07/10/2020     Lab Results   Component Value Date    WBC 8.9 05/21/2020    HGB 16.0 05/21/2020    HCT 48.0 05/21/2020     05/21/2020     Lab Results   Component Value Date    CREATININE 1.07 05/21/2020    BUN 19 05/21/2020     05/21/2020    K 3.6 05/21/2020    CO2 24 05/21/2020     Review of Systems:   General: Weight Gain  Eyes: WNL  Ears/Nose/Throat: WNL  Lungs: WNL  Heart: Shortness of Breath with activity  Stomach: WNL  Bladder: WNL  Muscle/Joints: Joint Pain, Muscle Weakness, Muscle Pain  Skin: WNL  Nervous System: WNL  Mental Health: Depression     Blood: Easy Bleeding, Easy Bruising(Due to blood thinner.)

## 2021-06-30 NOTE — PROGRESS NOTES
Progress Notes by Sohan Krueger at 2/25/2021 11:00 AM     Author: Sohan Krueger Service: -- Author Type: Patient Access    Filed: 2/25/2021 11:28 AM Encounter Date: 2/25/2021 Status: Signed    : Sohan Krueger (Patient Access)       Parrot Study - Day 1 Call     Putting Devices On    Study Purpose:   Atrial Fibrillation Algorithm Clinical Validation Study, prospective, multi-center, non significant risk     Subject was called on 2/25/21 for their Day 1 phone call to instructed subjects on putting on device.     Did the subject have a new reportable adverse events since last visit: No  If yes, please generate adverse event report for PI to cosign    Plan: Subject is schedule for a Day 5 phone call on 3/2/21 at 10 AM date & time.     Sohan Krueger

## 2021-06-30 NOTE — PROGRESS NOTES
Progress Notes by Gemma Nguyen, RN at 4/9/2021  4:07 PM     Author: Gemma Nguyen RN Service: -- Author Type: Registered Nurse    Filed: 4/9/2021  4:11 PM Encounter Date: 4/9/2021 Status: Signed    : Gemma Nguyen RN (Registered Nurse)       Andria Lee MD Caswell, Mallory J, RN             Please inform the 75-year-old gentleman with a history of coronary artery disease that echo shows some mild heart weakness, given this, we would prefer to use low-dose beta-blocker rather than the low-dose calcium channel blocker, specifically amlodipine.  Ideally I had like to stop the amlodipine, try a trial of carvedilol 3.125 mg p.o. twice daily.  Can you please arrange to start this and maybe bring him in for blood pressure check in about a week.  Warned him side effects might be some lightheadedness and slower heartbeat but otherwise no swelling which amlodipine does.  Once he gets in a stable dose of a beta-blocker with a good blood pressure I had like to see him back.   LF      Amlodipine discontinued. Carvedilol 3.125 mg PO two times a day [facility] per patient request. He knows to stop amlodipine once he gets his carvedilol. See telephone encounter dated 4/9. -[facility] [place]

## 2021-06-30 NOTE — PROGRESS NOTES
Progress Notes by Felton Landa at 3/10/2021  1:00 PM     Author: Felton Landa Service: -- Author Type: Patient Access    Filed: 3/10/2021  1:29 PM Encounter Date: 3/10/2021 Status: Signed    : Felton Landa (Patient Access)       Parrot Study - Day 13 Call     Removing Patch 2    Study Purpose:   Atrial Fibrillation Algorithm Clinical Validation Study, prospective, multi-center, non significant risk     Subject was called on 10-Mar-21 for their Day 13 phone call to instructed subjects on removing device patch 2 and packing and ship ping the box back to sponsor.     Did the subject have a new reportable adverse events since last visit: No  If yes, please generate adverse event report for PI to cosign    Plan: Subject is schedule to return devices today and this will complete this study for the subject.    Felton Landa

## 2021-07-08 DIAGNOSIS — Z11.59 ENCOUNTER FOR SCREENING FOR OTHER VIRAL DISEASES: ICD-10-CM

## 2021-07-14 PROBLEM — I20.0 ANGINA PECTORIS, CRESCENDO (H): Status: RESOLVED | Noted: 2019-12-04 | Resolved: 2019-12-19

## 2021-07-21 ENCOUNTER — TELEPHONE (OUTPATIENT)
Dept: CARDIOLOGY | Facility: CLINIC | Age: 76
End: 2021-07-21

## 2021-07-21 DIAGNOSIS — I20.0 ANGINA PECTORIS, CRESCENDO (H): ICD-10-CM

## 2021-07-21 DIAGNOSIS — E78.00 PURE HYPERCHOLESTEROLEMIA: Primary | ICD-10-CM

## 2021-07-21 NOTE — TELEPHONE ENCOUNTER
===View-only below this line===  ----- Message -----  From: Suresh Lee MD  Sent: 7/21/2021   2:34 PM CDT  To: Gemma Nguyen RN    I concur, atorvastatin as well as Zetia, amlodipine, aspirin as well as HCTZ.  Possibly get a fasting lipid profile with him on new diet because of his triglycerides remained elevated I did want to place him on Vascepa.  LF      ==Called patient and updated on response from LBF. He verbalized understanding and will resume Atorvastatin. [facility]. LIP ordered and faxed to Dr. Worthington as patient is having pre-op physical next week. -[facility] [place]

## 2021-07-21 NOTE — TELEPHONE ENCOUNTER
"----- Message from Niki Gaitan sent at 2021 11:23 AM CDT -----  Regarding: NOREEN PT  General phone call:    Caller: Cem  Primary cardiologist: NOREEN  Detailed reason for call: Patient would like a call back to go over his meds before his back surgery   Best phone number: [phone]  Best time to contact: any  Ok to leave a detailed message? yes  Device? no    Additional Info:     Called back patient to address his concerns. He is having back surgery early August and will be having a pre-operative physical with his PMD, Dr. Worthington, very soon. He wanted to make sure his medication list matched on file with what he has at home. We confirmed all his medications except he stopped taking Atorvastatin 80 mg 4 weeks ago. Writer cannot find a trail/reason for him to be off this medication. He states that [facility] told him it \"\" and he understood that as he did not need to take it anymore. We will refill this  Medication and update NOREEN. -[facility] [place]       Dr. Lee,  Cem has been off his atorvastatin x4 weeks. He thought he did not need to take anymore- I cannot find any indication of this/trail of communication regarding stopping statin. He said [facility] told him his rx \"\", I am guessing he was misinformed or misunderstood that he needed a new rx sent in. He has also been on nutrisystem and changing his diet significantly. I will just renew Atorvastatin 80 mg unless you have any other recommendations?  Thanks,  Mal   "

## 2021-07-22 RX ORDER — ATORVASTATIN CALCIUM 80 MG/1
TABLET, FILM COATED ORAL
Qty: 90 TABLET | Refills: 3 | Status: SHIPPED | OUTPATIENT
Start: 2021-07-22 | End: 2022-08-11

## 2021-07-27 ENCOUNTER — TRANSFERRED RECORDS (OUTPATIENT)
Dept: HEALTH INFORMATION MANAGEMENT | Facility: CLINIC | Age: 76
End: 2021-07-27

## 2021-07-27 ENCOUNTER — LAB REQUISITION (OUTPATIENT)
Dept: LAB | Facility: CLINIC | Age: 76
End: 2021-07-27
Payer: COMMERCIAL

## 2021-07-27 DIAGNOSIS — I10 ESSENTIAL (PRIMARY) HYPERTENSION: ICD-10-CM

## 2021-07-27 DIAGNOSIS — Z01.818 ENCOUNTER FOR OTHER PREPROCEDURAL EXAMINATION: ICD-10-CM

## 2021-07-27 DIAGNOSIS — E78.5 HYPERLIPIDEMIA, UNSPECIFIED: ICD-10-CM

## 2021-07-27 LAB
ALBUMIN SERPL-MCNC: 3.7 G/DL (ref 3.5–5)
ALP SERPL-CCNC: 78 U/L (ref 45–120)
ALT SERPL W P-5'-P-CCNC: 22 U/L (ref 0–45)
ANION GAP SERPL CALCULATED.3IONS-SCNC: 11 MMOL/L (ref 5–18)
AST SERPL W P-5'-P-CCNC: 21 U/L (ref 0–40)
BILIRUB SERPL-MCNC: 0.7 MG/DL (ref 0–1)
BUN SERPL-MCNC: 18 MG/DL (ref 8–28)
CALCIUM SERPL-MCNC: 9.5 MG/DL (ref 8.5–10.5)
CHLORIDE BLD-SCNC: 101 MMOL/L (ref 98–107)
CHOLEST SERPL-MCNC: 244 MG/DL
CO2 SERPL-SCNC: 29 MMOL/L (ref 22–31)
CREAT SERPL-MCNC: 1.02 MG/DL (ref 0.7–1.3)
ERYTHROCYTE [DISTWIDTH] IN BLOOD BY AUTOMATED COUNT: 14.4 % (ref 10–15)
FASTING STATUS PATIENT QL REPORTED: ABNORMAL
GFR SERPL CREATININE-BSD FRML MDRD: 72 ML/MIN/1.73M2
GLUCOSE BLD-MCNC: 104 MG/DL (ref 70–125)
HCT VFR BLD AUTO: 47.8 % (ref 40–53)
HDLC SERPL-MCNC: 37 MG/DL
HGB BLD-MCNC: 15.3 G/DL (ref 13.3–17.7)
LDLC SERPL CALC-MCNC: 135 MG/DL
LDLC SERPL CALC-MCNC: ABNORMAL MG/DL
MCH RBC QN AUTO: 27.7 PG (ref 26.5–33)
MCHC RBC AUTO-ENTMCNC: 32 G/DL (ref 31.5–36.5)
MCV RBC AUTO: 87 FL (ref 78–100)
PLATELET # BLD AUTO: 235 10E3/UL (ref 150–450)
POTASSIUM BLD-SCNC: 4.4 MMOL/L (ref 3.5–5)
PROT SERPL-MCNC: 6.7 G/DL (ref 6–8)
RBC # BLD AUTO: 5.52 10E6/UL (ref 4.4–5.9)
SODIUM SERPL-SCNC: 141 MMOL/L (ref 136–145)
TRIGL SERPL-MCNC: 410 MG/DL
WBC # BLD AUTO: 5.6 10E3/UL (ref 4–11)

## 2021-07-27 PROCEDURE — 83721 ASSAY OF BLOOD LIPOPROTEIN: CPT | Mod: ORL | Performed by: FAMILY MEDICINE

## 2021-07-27 PROCEDURE — 82040 ASSAY OF SERUM ALBUMIN: CPT | Performed by: FAMILY MEDICINE

## 2021-07-27 PROCEDURE — 85027 COMPLETE CBC AUTOMATED: CPT | Mod: ORL | Performed by: FAMILY MEDICINE

## 2021-07-27 PROCEDURE — 80061 LIPID PANEL: CPT | Mod: ORL | Performed by: FAMILY MEDICINE

## 2021-07-29 ENCOUNTER — OFFICE VISIT (OUTPATIENT)
Dept: CARDIOLOGY | Facility: CLINIC | Age: 76
End: 2021-07-29
Payer: COMMERCIAL

## 2021-07-29 VITALS
BODY MASS INDEX: 37.94 KG/M2 | HEIGHT: 70 IN | WEIGHT: 265 LBS | HEART RATE: 68 BPM | DIASTOLIC BLOOD PRESSURE: 60 MMHG | RESPIRATION RATE: 16 BRPM | SYSTOLIC BLOOD PRESSURE: 90 MMHG

## 2021-07-29 DIAGNOSIS — M54.40 BACK PAIN OF LUMBAR REGION WITH SCIATICA: ICD-10-CM

## 2021-07-29 DIAGNOSIS — I25.10 CORONARY ARTERY DISEASE INVOLVING NATIVE CORONARY ARTERY OF NATIVE HEART WITHOUT ANGINA PECTORIS: ICD-10-CM

## 2021-07-29 DIAGNOSIS — I10 ESSENTIAL HYPERTENSION: ICD-10-CM

## 2021-07-29 DIAGNOSIS — I47.29 PAROXYSMAL VT (H): ICD-10-CM

## 2021-07-29 DIAGNOSIS — Z01.810 PRE-OPERATIVE CARDIOVASCULAR EXAMINATION: Primary | ICD-10-CM

## 2021-07-29 PROCEDURE — 99214 OFFICE O/P EST MOD 30 MIN: CPT | Performed by: INTERNAL MEDICINE

## 2021-07-29 RX ORDER — HYDROCODONE BITARTRATE AND ACETAMINOPHEN 5; 325 MG/1; MG/1
TABLET ORAL PRN
Status: ON HOLD | COMMUNITY
Start: 2021-05-22 | End: 2021-08-05

## 2021-07-29 ASSESSMENT — MIFFLIN-ST. JEOR: SCORE: 1948.28

## 2021-07-29 NOTE — LETTER
7/29/2021    Bryson Worthington MD  [facility] [address]  [facility] [address]    RE: Cem Kevin       Dear Colleague,    I had the pleasure of seeing Cem Kevin in the [facility] Heart Care.        Rapid Access Clinic Note    Thank you, Bryson Alvarenga, for asking the [facility] Heart Care team to see Mr. Cem Kevin for pre-operative cardiovascular exam.        Assessment/Recommendations   Assessment:    1. Preoperative cardiovascular exam - prior to lumbar laminectomy. Using the NSQIP surgical risk calculator developed by the American College of Surgeons and using patient specific data, the proposed surgery carries a <1% risk of perioperative cardiac complications. Furthermore, the patient does not have any active cardiac conditions such as unstable angina or recent MI, decompensated heart failure, unstable arrhythmia or severe valvular heart disease. He does report some dyspnea with moderate exertion which is chronic. He has had a coronary angiogram with PCI within the past 2 years, a stress test that was negative for ischemia within the past 1.5 years, and an echocardiogram within the past 6 months. Based on current clinical preoperative guidelines and available research, additional cardiac evaluation and subsequent change in management would not further reduce the procedural risk from its present state. The patient understands that no procedure or surgery is without some degree of inherent risk and that not all adverse outcomes or complications can be prevented or anticipated.   2. Coronary artery disease -stable without angina.  3. Cardiomyopathy - mild with LVEF 45%  4. Frequent PACs on ECG   5. NSVT on cardiac rhythm monitors - no syncope. No known sustained arrhythmias    Plan:  1. Continue medications  2. Start taking magnesium supplement for ventricular ectopy  3. Okay to proceed with surgery as planned without  additional cardiac evaluation from cardiac standpoint.  4. Follow up as planned with Dr. Lee.    Primary Cardiologist: Dr. Suresh Lee MD         History of Present Illness   Mr. Cem Kevin is a 76 year old male with a significant past history of coronary artery disease, hypertension, hyperlipidemia, obesity who presents for preoperative cardiac exam.    Mr. Kevin has known coronary artery disease with PCI to mid LAD in late 2019, a mild cardiomyopathy with an EF of 45%, nonsustained ventricular tachycardia, hypertension, hyperlipidemia who presents for preoperative cardiac evaluation.  He is planning on having an L4-L5 laminectomy and core decompression surgery in early August.  He reports limited ability to perform physical activity due primarily to his radicular low back and leg pain.  He does also report some chronic shortness of breath with moderate exertion.  He has had ischemic evaluation within the last 2 years including coronary angiogram and stent placement to his mid LAD coronary artery in late 2019 as well as stress test performed in February 2020.  The stress test was negative for ischemia.  He had an echocardiogram performed in April of this year that revealed mildly decreased ejection fraction without significant valve disease or regional wall motion abnormalities.  He denies heart failure symptoms, angina, or symptoms consistent with sustained arrhythmia.  He has intentionally lost 15 pounds over the past 2 months.  He is able to tolerate walking 20 laps in the pool without stopping or exertional symptoms.      Other than noted above, Mr. Kevin denies any chest pain/pressure/tightness, shortness of breath at rest or with exertion, light headedness/dizziness, pre-syncope, syncope, lower extremity swelling, palpitations, paroxysmal nocturnal dyspnea (PND), or orthopnea.     Cardiac Problems and Cardiac Diagnostics     Most Recent Cardiac testing:  ECG dated 7/27/21 (personaly reviewed and  interpreted): sinus rhythm with first-degree AV block, frequent PACs, anterior infarct pattern.    ECHO (report reviewed):   TTE 4/8/21    Normal left ventricular size with normal wall thickness.    Left ventricle ejection fraction is mildly decreased. The estimated left ventricular ejection fraction is 45%.    Normal right ventricular size and systolic function.    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    No previous study for comparison.    Stress test: dated 2/10/2020 revealed      An exercise stress test was performed following a Sandeep protocol with the patient exercising for 4 minutes and 0 seconds.     The patient reported dyspnea during the stress test.     The patient's exercise capacity is moderately impaired.     The stress electrocardiogram is negative for inducible ischemic EKG changes.     Nuclear perfusion imaging is negative for inducible myocardial ischemia or infarction.     The left ventricular ejection fraction at stress is 52%.     The patient is at a low risk of future cardiac ischemic events.     A prior study was conducted on 11/5/2009. Prior images were unavailable for comparison review.    Cardiac cath: from 12/6/19 demonstrated     Exertional angina in obese patient with hypertension and hyperlipidemia.    Severe proximal-mid LAD disease treated with a Synergy CARLOS EDUARDO with good angiographic results.    Mild-moderate coronary disease otherwise.    LV EDP 22 mmHg, LV EF grossly normal.    Blood loss <20 cc       Medications  Allergies   Current Outpatient Medications   Medication Sig Dispense Refill     aspirin (ASPIRIN LOW DOSE) 81 MG EC tablet [ASPIRIN (ASPIRIN LOW DOSE) 81 MG EC TABLET] Take 1 tablet (81 mg total) by mouth daily. (Patient taking differently: Take 325 mg by mouth daily )  0     atorvastatin (LIPITOR) 80 MG tablet [ATORVASTATIN (LIPITOR) 80 MG TABLET] TAKE 1 TABLET AT BEDTIME 90 tablet 3     carvediloL  "(COREG) 3.125 MG tablet [CARVEDILOL (COREG) 3.125 MG TABLET] Take 3 tablets (9.25 mg total) by mouth 2 (two) times a day with meals. 540 tablet 1     celecoxib (CELEBREX) 200 MG capsule [CELECOXIB (CELEBREX) 200 MG CAPSULE] Take 1 capsule by mouth daily.       ezetimibe (ZETIA) 10 mg tablet [EZETIMIBE (ZETIA) 10 MG TABLET] TAKE 1 TABLET DAILY 90 tablet 3     glucosam-msm-chond-hrb149-hyal (GLUCOS-CHOND-MSM, WITH ANTIOX,) 500-500-66.7 mg Tab [GLUCOSAM-MSM-CHOND-HRB149-HYAL (GLUCOS-CHOND-MSM, WITH ANTIOX,) 500-500-66.7 MG TAB] Take 1-2 tablets by mouth.        hydroCHLOROthiazide (HYDRODIURIL) 25 MG tablet [HYDROCHLOROTHIAZIDE (HYDRODIURIL) 25 MG TABLET] Take 25 mg by mouth daily.        HYDROcodone-acetaminophen (NORCO) 5-325 MG tablet as needed       multivitamin (ONE A DAY) per tablet [MULTIVITAMIN (ONE A DAY) PER TABLET] Take 1 tablet by mouth daily.        nitroglycerin (NITROSTAT) 0.6 MG SL tablet [NITROGLYCERIN (NITROSTAT) 0.6 MG SL TABLET] Place 0.6 mg under the tongue every 5 (five) minutes as needed for chest pain.       venlafaxine (EFFEXOR) 75 MG tablet [VENLAFAXINE (EFFEXOR) 75 MG TABLET] Take 75 mg by mouth daily.       cholecalciferol, vitamin D3, 2,000 unit Tab [CHOLECALCIFEROL, VITAMIN D3, 2,000 UNIT TAB] Take 2,000 Units by mouth daily.  (Patient not taking: Reported on 7/29/2021)       venlafaxine (EFFEXOR-XR) 37.5 MG 24 hr capsule [VENLAFAXINE (EFFEXOR-XR) 37.5 MG 24 HR CAPSULE] Take 1 capsule by mouth daily as needed. Take it with the 75 mg capsule. (Patient not taking: Reported on 7/29/2021)        Allergies   Allergen Reactions     Vasotec [Enalapril] Swelling     IV form only per patient report        Physical Examination Review of Systems   BP 90/60 (BP Location: Right arm, Patient Position: Sitting, Cuff Size: Adult Large)   Pulse 68   Resp 16   Ht 1.778 m (5' 10\")   Wt 120.2 kg (265 lb)   BMI 38.02 kg/m    Body mass index is 38.02 kg/m .  Wt Readings from Last 3 Encounters:   07/29/21 " 120.2 kg (265 lb)   02/05/21 126.1 kg (278 lb)   07/16/20 121.1 kg (267 lb)       General Appearance:   Pleasant male, appears stated age. no acute distress, overweight body habitus   ENT/Mouth: Wearing a mask     EYES:  no scleral icterus, normal conjunctivae   Neck: no carotid bruits. supple   Respiratory:   lungs are clear to auscultation, no rales or wheezing,equal chest wall expansion    Cardiovascular:   Regular rhythm, normal rate. Normal first and second heart sounds with no murmurs, rubs, or gallops; the carotid, radial and posterior tibial pulses are intact, Jugular venous pressure normal, no edema bilaterally    Extremities: no cyanosis or clubbing   Skin: no xanthelasma, warm.    Heme/lymph/ Immunology No apparent bleeding noted.   Neurologic: Alert and oriented. no tremors     Psychiatric: Pleasant, calm, appropriate affect.    A complete 10 system review of systems was performed and is negative except as mentioned in the HPI or below:                                              Past History   Past Medical History:   Past Medical History:   Diagnosis Date     Angina pectoris (H)     per h&p     Hypertension     per H&P     Pure hypercholesterolemia     per H&P       Past Surgical History:   Past Surgical History:   Procedure Laterality Date     CV CORONARY ANGIOGRAM N/A 12/6/2019    Procedure: Coronary Angiogram;  Surgeon: Andria Morrison MD;  Location: [facility] Cath Lab;  Service: Cardiology     CV LEFT HEART CATHETERIZATION WITH LEFT VENTRICULOGRAM N/A 12/6/2019    Procedure: Left Heart Catheterization with Left Ventriculogram;  Surgeon: Andria Morrison MD;  Location: [facility] Cath Lab;  Service: Cardiology       Family History: History reviewed. No pertinent family history.     Social History:   Social History     Socioeconomic History     Marital status:      Spouse name: Not on file     Number of children: Not on file     Years of education: Not on file     Highest education level:  Not on file   Occupational History     Not on file   Tobacco Use     Smoking status: Former Smoker     Smokeless tobacco: Never Used     Tobacco comment: Quit 12 years ago   Substance and Sexual Activity     Alcohol use: Yes     Alcohol/week: 1.0 standard drinks     Comment: Alcoholic Drinks/day: 2-3 glasses per month'     Drug use: Not on file     Sexual activity: Not on file   Other Topics Concern     Not on file   Social History Narrative     Not on file     Social Determinants of Health     Financial Resource Strain:      Difficulty of Paying Living Expenses:    Food Insecurity:      Worried About Running Out of Food in the Last Year:      Ran Out of Food in the Last Year:    Transportation Needs:      Lack of Transportation (Medical):      Lack of Transportation (Non-Medical):    Physical Activity:      Days of Exercise per Week:      Minutes of Exercise per Session:    Stress:      Feeling of Stress :    Social Connections:      Frequency of Communication with Friends and Family:      Frequency of Social Gatherings with Friends and Family:      Attends [employer] Services:      Active Member of Clubs or Organizations:      Attends Club or Organization Meetings:      Marital Status:    Intimate Partner Violence:      Fear of Current or Ex-Partner:      Emotionally Abused:      Physically Abused:      Sexually Abused:               Lab Results    Chemistry/lipid CBC Cardiac Enzymes/BNP/TSH/INR   Lab Results   Component Value Date    CHOL 244 (H) 07/27/2021    HDL 37 (L) 07/27/2021    TRIG 410 (H) 07/27/2021    BUN 18 07/27/2021     07/27/2021    CO2 29 07/27/2021    Lab Results   Component Value Date    WBC 5.6 07/27/2021    HGB 15.3 07/27/2021    HCT 47.8 07/27/2021    MCV 87 07/27/2021     07/27/2021    Lab Results   Component Value Date    TROPONINI 0.01 05/21/2020    BNP <10 05/21/2020    TSH 1.71 07/17/2018                  Thank you for allowing me to participate in the care of your  patient.      Sincerely,     Jovan Ford MD     [facility]  cc:   No referring provider defined for this encounter.

## 2021-07-29 NOTE — PROGRESS NOTES
Rapid Access Clinic Note    Thank you, Bryson Alvarenga, for asking the [facility] team to see Mr. Cem Kevin for pre-operative cardiovascular exam.        Assessment/Recommendations   Assessment:    1. Preoperative cardiovascular exam - prior to lumbar laminectomy. Using the NSQIP surgical risk calculator developed by the American College [employer] and using patient specific data, the proposed surgery carries a <1% risk of perioperative cardiac complications. Furthermore, the patient does not have any active cardiac conditions such as unstable angina or recent MI, decompensated heart failure, unstable arrhythmia or severe valvular heart disease. He does report some dyspnea with moderate exertion which is chronic. He has had a coronary angiogram with PCI within the past 2 years, a stress test that was negative for ischemia within the past 1.5 years, and an echocardiogram within the past 6 months. Based on current clinical preoperative guidelines and available research, additional cardiac evaluation and subsequent change in management would not further reduce the procedural risk from its present state. The patient understands that no procedure or surgery is without some degree of inherent risk and that not all adverse outcomes or complications can be prevented or anticipated.   2. Coronary artery disease -stable without angina.  3. Cardiomyopathy - mild with LVEF 45%  4. Frequent PACs on ECG   5. NSVT on cardiac rhythm monitors - no syncope. No known sustained arrhythmias    Plan:  1. Continue medications  2. Start taking magnesium supplement for ventricular ectopy  3. Okay to proceed with surgery as planned without additional cardiac evaluation from cardiac standpoint.  4. Follow up as planned with Dr. Lee.    Primary Cardiologist: Dr. Suresh Lee MD         History of Present Illness   Mr. Cem Kevin is a 76 year old male with a significant past history of coronary artery  disease, hypertension, hyperlipidemia, obesity who presents for preoperative cardiac exam.    Mr. Kevin has known coronary artery disease with PCI to mid LAD in late 2019, a mild cardiomyopathy with an EF of 45%, nonsustained ventricular tachycardia, hypertension, hyperlipidemia who presents for preoperative cardiac evaluation.  He is planning on having an L4-L5 laminectomy and core decompression surgery in early August.  He reports limited ability to perform physical activity due primarily to his radicular low back and leg pain.  He does also report some chronic shortness of breath with moderate exertion.  He has had ischemic evaluation within the last 2 years including coronary angiogram and stent placement to his mid LAD coronary artery in late 2019 as well as stress test performed in February 2020.  The stress test was negative for ischemia.  He had an echocardiogram performed in April of this year that revealed mildly decreased ejection fraction without significant valve disease or regional wall motion abnormalities.  He denies heart failure symptoms, angina, or symptoms consistent with sustained arrhythmia.  He has intentionally lost 15 pounds over the past 2 months.  He is able to tolerate walking 20 laps in the pool without stopping or exertional symptoms.      Other than noted above, Mr. Kevin denies any chest pain/pressure/tightness, shortness of breath at rest or with exertion, light headedness/dizziness, pre-syncope, syncope, lower extremity swelling, palpitations, paroxysmal nocturnal dyspnea (PND), or orthopnea.     Cardiac Problems and Cardiac Diagnostics     Most Recent Cardiac testing:  ECG dated 7/27/21 (personaly reviewed and interpreted): sinus rhythm with first-degree AV block, frequent PACs, anterior infarct pattern.    ECHO (report reviewed):   TTE 4/8/21    Normal left ventricular size with normal wall thickness.    Left ventricle ejection fraction is mildly decreased. The estimated left  ventricular ejection fraction is 45%.    Normal right ventricular size and systolic function.    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    No previous study for comparison.    Stress test: dated 2/10/2020 revealed      An exercise stress test was performed following a Sandeep protocol with the patient exercising for 4 minutes and 0 seconds.     The patient reported dyspnea during the stress test.     The patient's exercise capacity is moderately impaired.     The stress electrocardiogram is negative for inducible ischemic EKG changes.     Nuclear perfusion imaging is negative for inducible myocardial ischemia or infarction.     The left ventricular ejection fraction at stress is 52%.     The patient is at a low risk of future cardiac ischemic events.     A prior study was conducted on 11/5/2009. Prior images were unavailable for comparison review.    Cardiac cath: from 12/6/19 demonstrated     Exertional angina in obese patient with hypertension and hyperlipidemia.    Severe proximal-mid LAD disease treated with a Synergy CARLOS EDUARDO with good angiographic results.    Mild-moderate coronary disease otherwise.    LV EDP 22 mmHg, LV EF grossly normal.    Blood loss <20 cc       Medications  Allergies   Current Outpatient Medications   Medication Sig Dispense Refill     aspirin (ASPIRIN LOW DOSE) 81 MG EC tablet [ASPIRIN (ASPIRIN LOW DOSE) 81 MG EC TABLET] Take 1 tablet (81 mg total) by mouth daily. (Patient taking differently: Take 325 mg by mouth daily )  0     atorvastatin (LIPITOR) 80 MG tablet [ATORVASTATIN (LIPITOR) 80 MG TABLET] TAKE 1 TABLET AT BEDTIME 90 tablet 3     carvediloL (COREG) 3.125 MG tablet [CARVEDILOL (COREG) 3.125 MG TABLET] Take 3 tablets (9.25 mg total) by mouth 2 (two) times a day with meals. 540 tablet 1     celecoxib (CELEBREX) 200 MG capsule [CELECOXIB (CELEBREX) 200 MG CAPSULE] Take 1 capsule by mouth daily.       ezetimibe  "(ZETIA) 10 mg tablet [EZETIMIBE (ZETIA) 10 MG TABLET] TAKE 1 TABLET DAILY 90 tablet 3     glucosam-msm-chond-hrb149-hyal (GLUCOS-CHOND-MSM, WITH ANTIOX,) 500-500-66.7 mg Tab [GLUCOSAM-MSM-CHOND-HRB149-HYAL (GLUCOS-CHOND-MSM, WITH ANTIOX,) 500-500-66.7 MG TAB] Take 1-2 tablets by mouth.        hydroCHLOROthiazide (HYDRODIURIL) 25 MG tablet [HYDROCHLOROTHIAZIDE (HYDRODIURIL) 25 MG TABLET] Take 25 mg by mouth daily.        HYDROcodone-acetaminophen (NORCO) 5-325 MG tablet as needed       multivitamin (ONE A DAY) per tablet [MULTIVITAMIN (ONE A DAY) PER TABLET] Take 1 tablet by mouth daily.        nitroglycerin (NITROSTAT) 0.6 MG SL tablet [NITROGLYCERIN (NITROSTAT) 0.6 MG SL TABLET] Place 0.6 mg under the tongue every 5 (five) minutes as needed for chest pain.       venlafaxine (EFFEXOR) 75 MG tablet [VENLAFAXINE (EFFEXOR) 75 MG TABLET] Take 75 mg by mouth daily.       cholecalciferol, vitamin D3, 2,000 unit Tab [CHOLECALCIFEROL, VITAMIN D3, 2,000 UNIT TAB] Take 2,000 Units by mouth daily.  (Patient not taking: Reported on 7/29/2021)       venlafaxine (EFFEXOR-XR) 37.5 MG 24 hr capsule [VENLAFAXINE (EFFEXOR-XR) 37.5 MG 24 HR CAPSULE] Take 1 capsule by mouth daily as needed. Take it with the 75 mg capsule. (Patient not taking: Reported on 7/29/2021)        Allergies   Allergen Reactions     Vasotec [Enalapril] Swelling     IV form only per patient report        Physical Examination Review of Systems   BP 90/60 (BP Location: Right arm, Patient Position: Sitting, Cuff Size: Adult Large)   Pulse 68   Resp 16   Ht 1.778 m (5' 10\")   Wt 120.2 kg (265 lb)   BMI 38.02 kg/m    Body mass index is 38.02 kg/m .  Wt Readings from Last 3 Encounters:   07/29/21 120.2 kg (265 lb)   02/05/21 126.1 kg (278 lb)   07/16/20 121.1 kg (267 lb)       General Appearance:   Pleasant male, appears stated age. no acute distress, overweight body habitus   ENT/Mouth: Wearing a mask     EYES:  no scleral icterus, normal conjunctivae   Neck: no " carotid bruits. supple   Respiratory:   lungs are clear to auscultation, no rales or wheezing,equal chest wall expansion    Cardiovascular:   Regular rhythm, normal rate. Normal first and second heart sounds with no murmurs, rubs, or gallops; the carotid, radial and posterior tibial pulses are intact, Jugular venous pressure normal, no edema bilaterally    Extremities: no cyanosis or clubbing   Skin: no xanthelasma, warm.    Heme/lymph/ Immunology No apparent bleeding noted.   Neurologic: Alert and oriented. no tremors     Psychiatric: Pleasant, calm, appropriate affect.    A complete 10 system review of systems was performed and is negative except as mentioned in the HPI or below:                                              Past History   Past Medical History:   Past Medical History:   Diagnosis Date     Angina pectoris (H)     per h&p     Hypertension     per H&P     Pure hypercholesterolemia     per H&P       Past Surgical History:   Past Surgical History:   Procedure Laterality Date     CV CORONARY ANGIOGRAM N/A 12/6/2019    Procedure: Coronary Angiogram;  Surgeon: Andria Morrison MD;  Location: [facility] Cath Lab;  Service: Cardiology     CV LEFT HEART CATHETERIZATION WITH LEFT VENTRICULOGRAM N/A 12/6/2019    Procedure: Left Heart Catheterization with Left Ventriculogram;  Surgeon: Andria Morrison MD;  Location: [facility] Cath Lab;  Service: Cardiology       Family History: History reviewed. No pertinent family history.     Social History:   Social History     Socioeconomic History     Marital status:      Spouse name: Not on file     Number of children: Not on file     Years of education: Not on file     Highest education level: Not on file   Occupational History     Not on file   Tobacco Use     Smoking status: Former Smoker     Smokeless tobacco: Never Used     Tobacco comment: Quit 12 years ago   Substance and Sexual Activity     Alcohol use: Yes     Alcohol/week: 1.0 standard drinks      Comment: Alcoholic Drinks/day: 2-3 glasses per month'     Drug use: Not on file     Sexual activity: Not on file   Other Topics Concern     Not on file   Social History Narrative     Not on file     Social Determinants of Health     Financial Resource Strain:      Difficulty of Paying Living Expenses:    Food Insecurity:      Worried About Running Out of Food in the Last Year:      Ran Out of Food in the Last Year:    Transportation Needs:      Lack of Transportation (Medical):      Lack of Transportation (Non-Medical):    Physical Activity:      Days of Exercise per Week:      Minutes of Exercise per Session:    Stress:      Feeling of Stress :    Social Connections:      Frequency of Communication with Friends and Family:      Frequency of Social Gatherings with Friends and Family:      Attends Adventism Services:      Active Member of Clubs or Organizations:      Attends Club or Organization Meetings:      Marital Status:    Intimate Partner Violence:      Fear of Current or Ex-Partner:      Emotionally Abused:      Physically Abused:      Sexually Abused:               Lab Results    Chemistry/lipid CBC Cardiac Enzymes/BNP/TSH/INR   Lab Results   Component Value Date    CHOL 244 (H) 07/27/2021    HDL 37 (L) 07/27/2021    TRIG 410 (H) 07/27/2021    BUN 18 07/27/2021     07/27/2021    CO2 29 07/27/2021    Lab Results   Component Value Date    WBC 5.6 07/27/2021    HGB 15.3 07/27/2021    HCT 47.8 07/27/2021    MCV 87 07/27/2021     07/27/2021    Lab Results   Component Value Date    TROPONINI 0.01 05/21/2020    BNP <10 05/21/2020    TSH 1.71 07/17/2018

## 2021-07-29 NOTE — PATIENT INSTRUCTIONS
It was a pleasure to meet with you today.      Below is a summary of your visit.   1. You can go ahead with surgery from a cardiac standpoint without additional testing or change in management. Your risk of surgery to your heart is not 0 and cannot be made to be 0. That said, your risk of major heart problem from surgery is still <1%.  2. It is okay to interrupt aspirin as directed, but please continue your carvedilol throughout the operative period.  3. Try taking Slow Mag, 1 tablet twice daily to help reduce the extra heartbeats that you have.  4. Follow up with Dr. Lee as scheduled.    Please do not hesitate to call the [facility] clinic with any questions or concerns at [phone]. You can also reach my nurse, Елена, during normal business hours at [phone].    Sincerely,

## 2021-07-29 NOTE — H&P (VIEW-ONLY)
Rapid Access Clinic Note    Thank you, Bryson Alvarenga, for asking the [facility] team to see Mr. Cem Kevin for pre-operative cardiovascular exam.        Assessment/Recommendations   Assessment:    1. Preoperative cardiovascular exam - prior to lumbar laminectomy. Using the NSQIP surgical risk calculator developed by the American College of Surgeons and using patient specific data, the proposed surgery carries a <1% risk of perioperative cardiac complications. Furthermore, the patient does not have any active cardiac conditions such as unstable angina or recent MI, decompensated heart failure, unstable arrhythmia or severe valvular heart disease. He does report some dyspnea with moderate exertion which is chronic. He has had a coronary angiogram with PCI within the past 2 years, a stress test that was negative for ischemia within the past 1.5 years, and an echocardiogram within the past 6 months. Based on current clinical preoperative guidelines and available research, additional cardiac evaluation and subsequent change in management would not further reduce the procedural risk from its present state. The patient understands that no procedure or surgery is without some degree of inherent risk and that not all adverse outcomes or complications can be prevented or anticipated.   2. Coronary artery disease -stable without angina.  3. Cardiomyopathy - mild with LVEF 45%  4. Frequent PACs on ECG   5. NSVT on cardiac rhythm monitors - no syncope. No known sustained arrhythmias    Plan:  1. Continue medications  2. Start taking magnesium supplement for ventricular ectopy  3. Okay to proceed with surgery as planned without additional cardiac evaluation from cardiac standpoint.  4. Follow up as planned with Dr. Lee.    Primary Cardiologist: Dr. Suresh Lee MD         History of Present Illness   Mr. Cem Kevin is a 76 year old male with a significant past history of coronary artery  disease, hypertension, hyperlipidemia, obesity who presents for preoperative cardiac exam.    Mr. Kevin has known coronary artery disease with PCI to mid LAD in late 2019, a mild cardiomyopathy with an EF of 45%, nonsustained ventricular tachycardia, hypertension, hyperlipidemia who presents for preoperative cardiac evaluation.  He is planning on having an L4-L5 laminectomy and core decompression surgery in early August.  He reports limited ability to perform physical activity due primarily to his radicular low back and leg pain.  He does also report some chronic shortness of breath with moderate exertion.  He has had ischemic evaluation within the last 2 years including coronary angiogram and stent placement to his mid LAD coronary artery in late 2019 as well as stress test performed in February 2020.  The stress test was negative for ischemia.  He had an echocardiogram performed in April of this year that revealed mildly decreased ejection fraction without significant valve disease or regional wall motion abnormalities.  He denies heart failure symptoms, angina, or symptoms consistent with sustained arrhythmia.  He has intentionally lost 15 pounds over the past 2 months.  He is able to tolerate walking 20 laps in the pool without stopping or exertional symptoms.      Other than noted above, Mr. Kevin denies any chest pain/pressure/tightness, shortness of breath at rest or with exertion, light headedness/dizziness, pre-syncope, syncope, lower extremity swelling, palpitations, paroxysmal nocturnal dyspnea (PND), or orthopnea.     Cardiac Problems and Cardiac Diagnostics     Most Recent Cardiac testing:  ECG dated 7/27/21 (personaly reviewed and interpreted): sinus rhythm with first-degree AV block, frequent PACs, anterior infarct pattern.    ECHO (report reviewed):   TTE 4/8/21    Normal left ventricular size with normal wall thickness.    Left ventricle ejection fraction is mildly decreased. The estimated left  ventricular ejection fraction is 45%.    Normal right ventricular size and systolic function.    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    No previous study for comparison.    Stress test: dated 2/10/2020 revealed      An exercise stress test was performed following a Sandeep protocol with the patient exercising for 4 minutes and 0 seconds.     The patient reported dyspnea during the stress test.     The patient's exercise capacity is moderately impaired.     The stress electrocardiogram is negative for inducible ischemic EKG changes.     Nuclear perfusion imaging is negative for inducible myocardial ischemia or infarction.     The left ventricular ejection fraction at stress is 52%.     The patient is at a low risk of future cardiac ischemic events.     A prior study was conducted on 11/5/2009. Prior images were unavailable for comparison review.    Cardiac cath: from 12/6/19 demonstrated     Exertional angina in obese patient with hypertension and hyperlipidemia.    Severe proximal-mid LAD disease treated with a Synergy CARLOS EDUARDO with good angiographic results.    Mild-moderate coronary disease otherwise.    LV EDP 22 mmHg, LV EF grossly normal.    Blood loss <20 cc       Medications  Allergies   Current Outpatient Medications   Medication Sig Dispense Refill     aspirin (ASPIRIN LOW DOSE) 81 MG EC tablet [ASPIRIN (ASPIRIN LOW DOSE) 81 MG EC TABLET] Take 1 tablet (81 mg total) by mouth daily. (Patient taking differently: Take 325 mg by mouth daily )  0     atorvastatin (LIPITOR) 80 MG tablet [ATORVASTATIN (LIPITOR) 80 MG TABLET] TAKE 1 TABLET AT BEDTIME 90 tablet 3     carvediloL (COREG) 3.125 MG tablet [CARVEDILOL (COREG) 3.125 MG TABLET] Take 3 tablets (9.25 mg total) by mouth 2 (two) times a day with meals. 540 tablet 1     celecoxib (CELEBREX) 200 MG capsule [CELECOXIB (CELEBREX) 200 MG CAPSULE] Take 1 capsule by mouth daily.       ezetimibe  "(ZETIA) 10 mg tablet [EZETIMIBE (ZETIA) 10 MG TABLET] TAKE 1 TABLET DAILY 90 tablet 3     glucosam-msm-chond-hrb149-hyal (GLUCOS-CHOND-MSM, WITH ANTIOX,) 500-500-66.7 mg Tab [GLUCOSAM-MSM-CHOND-HRB149-HYAL (GLUCOS-CHOND-MSM, WITH ANTIOX,) 500-500-66.7 MG TAB] Take 1-2 tablets by mouth.        hydroCHLOROthiazide (HYDRODIURIL) 25 MG tablet [HYDROCHLOROTHIAZIDE (HYDRODIURIL) 25 MG TABLET] Take 25 mg by mouth daily.        HYDROcodone-acetaminophen (NORCO) 5-325 MG tablet as needed       multivitamin (ONE A DAY) per tablet [MULTIVITAMIN (ONE A DAY) PER TABLET] Take 1 tablet by mouth daily.        nitroglycerin (NITROSTAT) 0.6 MG SL tablet [NITROGLYCERIN (NITROSTAT) 0.6 MG SL TABLET] Place 0.6 mg under the tongue every 5 (five) minutes as needed for chest pain.       venlafaxine (EFFEXOR) 75 MG tablet [VENLAFAXINE (EFFEXOR) 75 MG TABLET] Take 75 mg by mouth daily.       cholecalciferol, vitamin D3, 2,000 unit Tab [CHOLECALCIFEROL, VITAMIN D3, 2,000 UNIT TAB] Take 2,000 Units by mouth daily.  (Patient not taking: Reported on 7/29/2021)       venlafaxine (EFFEXOR-XR) 37.5 MG 24 hr capsule [VENLAFAXINE (EFFEXOR-XR) 37.5 MG 24 HR CAPSULE] Take 1 capsule by mouth daily as needed. Take it with the 75 mg capsule. (Patient not taking: Reported on 7/29/2021)        Allergies   Allergen Reactions     Vasotec [Enalapril] Swelling     IV form only per patient report        Physical Examination Review of Systems   BP 90/60 (BP Location: Right arm, Patient Position: Sitting, Cuff Size: Adult Large)   Pulse 68   Resp 16   Ht 1.778 m (5' 10\")   Wt 120.2 kg (265 lb)   BMI 38.02 kg/m    Body mass index is 38.02 kg/m .  Wt Readings from Last 3 Encounters:   07/29/21 120.2 kg (265 lb)   02/05/21 126.1 kg (278 lb)   07/16/20 121.1 kg (267 lb)       General Appearance:   Pleasant male, appears stated age. no acute distress, overweight body habitus   ENT/Mouth: Wearing a mask     EYES:  no scleral icterus, normal conjunctivae   Neck: no " carotid bruits. supple   Respiratory:   lungs are clear to auscultation, no rales or wheezing,equal chest wall expansion    Cardiovascular:   Regular rhythm, normal rate. Normal first and second heart sounds with no murmurs, rubs, or gallops; the carotid, radial and posterior tibial pulses are intact, Jugular venous pressure normal, no edema bilaterally    Extremities: no cyanosis or clubbing   Skin: no xanthelasma, warm.    Heme/lymph/ Immunology No apparent bleeding noted.   Neurologic: Alert and oriented. no tremors     Psychiatric: Pleasant, calm, appropriate affect.    A complete 10 system review of systems was performed and is negative except as mentioned in the HPI or below:                                              Past History   Past Medical History:   Past Medical History:   Diagnosis Date     Angina pectoris (H)     per h&p     Hypertension     per H&P     Pure hypercholesterolemia     per H&P       Past Surgical History:   Past Surgical History:   Procedure Laterality Date     CV CORONARY ANGIOGRAM N/A 12/6/2019    Procedure: Coronary Angiogram;  Surgeon: Andria Morrison MD;  Location: [facility] Cath Lab;  Service: Cardiology     CV LEFT HEART CATHETERIZATION WITH LEFT VENTRICULOGRAM N/A 12/6/2019    Procedure: Left Heart Catheterization with Left Ventriculogram;  Surgeon: Andria Morrison MD;  Location: [facility] Cath Lab;  Service: Cardiology       Family History: History reviewed. No pertinent family history.     Social History:   Social History     Socioeconomic History     Marital status:      Spouse name: Not on file     Number of children: Not on file     Years of education: Not on file     Highest education level: Not on file   Occupational History     Not on file   Tobacco Use     Smoking status: Former Smoker     Smokeless tobacco: Never Used     Tobacco comment: Quit 12 years ago   Substance and Sexual Activity     Alcohol use: Yes     Alcohol/week: 1.0 standard drinks      Comment: Alcoholic Drinks/day: 2-3 glasses per month'     Drug use: Not on file     Sexual activity: Not on file   Other Topics Concern     Not on file   Social History Narrative     Not on file     Social Determinants of Health     Financial Resource Strain:      Difficulty of Paying Living Expenses:    Food Insecurity:      Worried About Running Out of Food in the Last Year:      Ran Out of Food in the Last Year:    Transportation Needs:      Lack of Transportation (Medical):      Lack of Transportation (Non-Medical):    Physical Activity:      Days of Exercise per Week:      Minutes of Exercise per Session:    Stress:      Feeling of Stress :    Social Connections:      Frequency of Communication with Friends and Family:      Frequency of Social Gatherings with Friends and Family:      Attends Hinduism Services:      Active Member of Clubs or Organizations:      Attends Club or Organization Meetings:      Marital Status:    Intimate Partner Violence:      Fear of Current or Ex-Partner:      Emotionally Abused:      Physically Abused:      Sexually Abused:               Lab Results    Chemistry/lipid CBC Cardiac Enzymes/BNP/TSH/INR   Lab Results   Component Value Date    CHOL 244 (H) 07/27/2021    HDL 37 (L) 07/27/2021    TRIG 410 (H) 07/27/2021    BUN 18 07/27/2021     07/27/2021    CO2 29 07/27/2021    Lab Results   Component Value Date    WBC 5.6 07/27/2021    HGB 15.3 07/27/2021    HCT 47.8 07/27/2021    MCV 87 07/27/2021     07/27/2021    Lab Results   Component Value Date    TROPONINI 0.01 05/21/2020    BNP <10 05/21/2020    TSH 1.71 07/17/2018

## 2021-08-02 ENCOUNTER — LAB (OUTPATIENT)
Dept: FAMILY MEDICINE | Facility: CLINIC | Age: 76
End: 2021-08-02
Payer: COMMERCIAL

## 2021-08-02 DIAGNOSIS — Z11.59 ENCOUNTER FOR SCREENING FOR OTHER VIRAL DISEASES: ICD-10-CM

## 2021-08-02 LAB — SARS-COV-2 RNA RESP QL NAA+PROBE: NEGATIVE

## 2021-08-02 PROCEDURE — U0005 INFEC AGEN DETEC AMPLI PROBE: HCPCS

## 2021-08-02 PROCEDURE — U0003 INFECTIOUS AGENT DETECTION BY NUCLEIC ACID (DNA OR RNA); SEVERE ACUTE RESPIRATORY SYNDROME CORONAVIRUS 2 (SARS-COV-2) (CORONAVIRUS DISEASE [COVID-19]), AMPLIFIED PROBE TECHNIQUE, MAKING USE OF HIGH THROUGHPUT TECHNOLOGIES AS DESCRIBED BY CMS-2020-01-R: HCPCS

## 2021-08-04 ENCOUNTER — ANESTHESIA EVENT (OUTPATIENT)
Dept: SURGERY | Facility: CLINIC | Age: 76
End: 2021-08-04
Payer: COMMERCIAL

## 2021-08-04 NOTE — OR NURSING
Cem was called about the time change to arrive at 0530. He was expressing is frustration and stated how unprofessional it is to change his time, as he has already made arrangements. E-mail sent to [facility].

## 2021-08-05 ENCOUNTER — HOSPITAL ENCOUNTER (OUTPATIENT)
Facility: CLINIC | Age: 76
Discharge: HOME | End: 2021-08-05
Attending: ORTHOPAEDIC SURGERY | Admitting: ORTHOPAEDIC SURGERY
Payer: COMMERCIAL

## 2021-08-05 ENCOUNTER — ANESTHESIA (OUTPATIENT)
Dept: SURGERY | Facility: CLINIC | Age: 76
End: 2021-08-05
Payer: COMMERCIAL

## 2021-08-05 ENCOUNTER — APPOINTMENT (OUTPATIENT)
Dept: RADIOLOGY | Facility: CLINIC | Age: 76
End: 2021-08-05
Attending: ORTHOPAEDIC SURGERY
Payer: COMMERCIAL

## 2021-08-05 VITALS
OXYGEN SATURATION: 96 % | SYSTOLIC BLOOD PRESSURE: 143 MMHG | HEIGHT: 70 IN | WEIGHT: 259.7 LBS | BODY MASS INDEX: 37.18 KG/M2 | RESPIRATION RATE: 16 BRPM | DIASTOLIC BLOOD PRESSURE: 75 MMHG | HEART RATE: 59 BPM | TEMPERATURE: 97.8 F

## 2021-08-05 DIAGNOSIS — M48.062 SPINAL STENOSIS OF LUMBAR REGION WITH NEUROGENIC CLAUDICATION: Primary | ICD-10-CM

## 2021-08-05 PROCEDURE — 370N000017 HC ANESTHESIA TECHNICAL FEE, PER MIN: Performed by: ORTHOPAEDIC SURGERY

## 2021-08-05 PROCEDURE — 710N000012 HC RECOVERY PHASE 2, PER MINUTE: Performed by: ORTHOPAEDIC SURGERY

## 2021-08-05 PROCEDURE — 250N000025 HC SEVOFLURANE, PER MIN: Performed by: ORTHOPAEDIC SURGERY

## 2021-08-05 PROCEDURE — 360N000084 HC SURGERY LEVEL 4 W/ FLUORO, PER MIN: Performed by: ORTHOPAEDIC SURGERY

## 2021-08-05 PROCEDURE — 999N000182 XR SURGERY CARM FLUORO GREATER THAN 5 MIN

## 2021-08-05 PROCEDURE — 272N000001 HC OR GENERAL SUPPLY STERILE: Performed by: ORTHOPAEDIC SURGERY

## 2021-08-05 PROCEDURE — 250N000011 HC RX IP 250 OP 636: Performed by: ANESTHESIOLOGY

## 2021-08-05 PROCEDURE — 250N000011 HC RX IP 250 OP 636: Performed by: NURSE ANESTHETIST, CERTIFIED REGISTERED

## 2021-08-05 PROCEDURE — 999N000141 HC STATISTIC PRE-PROCEDURE NURSING ASSESSMENT: Performed by: ORTHOPAEDIC SURGERY

## 2021-08-05 PROCEDURE — 250N000013 HC RX MED GY IP 250 OP 250 PS 637: Performed by: ANESTHESIOLOGY

## 2021-08-05 PROCEDURE — 250N000009 HC RX 250: Performed by: ORTHOPAEDIC SURGERY

## 2021-08-05 PROCEDURE — 710N000010 HC RECOVERY PHASE 1, LEVEL 2, PER MIN: Performed by: ORTHOPAEDIC SURGERY

## 2021-08-05 PROCEDURE — 258N000003 HC RX IP 258 OP 636: Performed by: ANESTHESIOLOGY

## 2021-08-05 PROCEDURE — 250N000013 HC RX MED GY IP 250 OP 250 PS 637: Performed by: PHYSICIAN ASSISTANT

## 2021-08-05 PROCEDURE — 250N000009 HC RX 250: Performed by: NURSE ANESTHETIST, CERTIFIED REGISTERED

## 2021-08-05 PROCEDURE — 250N000011 HC RX IP 250 OP 636: Performed by: PHYSICIAN ASSISTANT

## 2021-08-05 PROCEDURE — 250N000013 HC RX MED GY IP 250 OP 250 PS 637: Performed by: ORTHOPAEDIC SURGERY

## 2021-08-05 PROCEDURE — 258N000003 HC RX IP 258 OP 636: Performed by: NURSE ANESTHETIST, CERTIFIED REGISTERED

## 2021-08-05 PROCEDURE — 250N000011 HC RX IP 250 OP 636: Performed by: ORTHOPAEDIC SURGERY

## 2021-08-05 RX ORDER — LIDOCAINE HYDROCHLORIDE 20 MG/ML
INJECTION, SOLUTION INFILTRATION; PERINEURAL PRN
Status: DISCONTINUED | OUTPATIENT
Start: 2021-08-05 | End: 2021-08-05

## 2021-08-05 RX ORDER — EPHEDRINE SULFATE 50 MG/ML
INJECTION, SOLUTION INTRAMUSCULAR; INTRAVENOUS; SUBCUTANEOUS PRN
Status: DISCONTINUED | OUTPATIENT
Start: 2021-08-05 | End: 2021-08-05

## 2021-08-05 RX ORDER — HYDROMORPHONE HYDROCHLORIDE 2 MG/1
2 TABLET ORAL EVERY 4 HOURS PRN
Status: DISCONTINUED | OUTPATIENT
Start: 2021-08-05 | End: 2021-08-05 | Stop reason: HOSPADM

## 2021-08-05 RX ORDER — KETAMINE HYDROCHLORIDE 10 MG/ML
INJECTION INTRAMUSCULAR; INTRAVENOUS PRN
Status: DISCONTINUED | OUTPATIENT
Start: 2021-08-05 | End: 2021-08-05

## 2021-08-05 RX ORDER — HYDROMORPHONE HCL IN WATER/PF 6 MG/30 ML
0.2 PATIENT CONTROLLED ANALGESIA SYRINGE INTRAVENOUS EVERY 5 MIN PRN
Status: DISCONTINUED | OUTPATIENT
Start: 2021-08-05 | End: 2021-08-05 | Stop reason: HOSPADM

## 2021-08-05 RX ORDER — SODIUM CHLORIDE, SODIUM LACTATE, POTASSIUM CHLORIDE, CALCIUM CHLORIDE 600; 310; 30; 20 MG/100ML; MG/100ML; MG/100ML; MG/100ML
INJECTION, SOLUTION INTRAVENOUS CONTINUOUS
Status: DISCONTINUED | OUTPATIENT
Start: 2021-08-05 | End: 2021-08-05 | Stop reason: HOSPADM

## 2021-08-05 RX ORDER — ONDANSETRON 2 MG/ML
4 INJECTION INTRAMUSCULAR; INTRAVENOUS EVERY 6 HOURS PRN
Status: DISCONTINUED | OUTPATIENT
Start: 2021-08-05 | End: 2021-08-05 | Stop reason: HOSPADM

## 2021-08-05 RX ORDER — ONDANSETRON 4 MG/1
4 TABLET, ORALLY DISINTEGRATING ORAL EVERY 6 HOURS PRN
Status: DISCONTINUED | OUTPATIENT
Start: 2021-08-05 | End: 2021-08-05 | Stop reason: HOSPADM

## 2021-08-05 RX ORDER — VENLAFAXINE HYDROCHLORIDE 75 MG/1
75 CAPSULE, EXTENDED RELEASE ORAL DAILY
COMMUNITY
End: 2024-04-04

## 2021-08-05 RX ORDER — PROCHLORPERAZINE MALEATE 5 MG
5 TABLET ORAL EVERY 6 HOURS PRN
Status: DISCONTINUED | OUTPATIENT
Start: 2021-08-05 | End: 2021-08-05 | Stop reason: HOSPADM

## 2021-08-05 RX ORDER — GLYCOPYRROLATE 0.2 MG/ML
INJECTION, SOLUTION INTRAMUSCULAR; INTRAVENOUS PRN
Status: DISCONTINUED | OUTPATIENT
Start: 2021-08-05 | End: 2021-08-05

## 2021-08-05 RX ORDER — NALOXONE HYDROCHLORIDE 0.4 MG/ML
0.4 INJECTION, SOLUTION INTRAMUSCULAR; INTRAVENOUS; SUBCUTANEOUS
Status: DISCONTINUED | OUTPATIENT
Start: 2021-08-05 | End: 2021-08-05 | Stop reason: HOSPADM

## 2021-08-05 RX ORDER — FENTANYL CITRATE 50 UG/ML
25 INJECTION, SOLUTION INTRAMUSCULAR; INTRAVENOUS EVERY 5 MIN PRN
Status: DISCONTINUED | OUTPATIENT
Start: 2021-08-05 | End: 2021-08-05 | Stop reason: HOSPADM

## 2021-08-05 RX ORDER — HYDROXYZINE HYDROCHLORIDE 10 MG/1
10 TABLET, FILM COATED ORAL EVERY 6 HOURS PRN
Status: DISCONTINUED | OUTPATIENT
Start: 2021-08-05 | End: 2021-08-05 | Stop reason: HOSPADM

## 2021-08-05 RX ORDER — DEXAMETHASONE SODIUM PHOSPHATE 4 MG/ML
INJECTION, SOLUTION INTRA-ARTICULAR; INTRALESIONAL; INTRAMUSCULAR; INTRAVENOUS; SOFT TISSUE PRN
Status: DISCONTINUED | OUTPATIENT
Start: 2021-08-05 | End: 2021-08-05

## 2021-08-05 RX ORDER — HALOPERIDOL 5 MG/ML
1 INJECTION INTRAMUSCULAR
Status: DISCONTINUED | OUTPATIENT
Start: 2021-08-05 | End: 2021-08-05 | Stop reason: HOSPADM

## 2021-08-05 RX ORDER — PROPOFOL 10 MG/ML
INJECTION, EMULSION INTRAVENOUS CONTINUOUS PRN
Status: DISCONTINUED | OUTPATIENT
Start: 2021-08-05 | End: 2021-08-05

## 2021-08-05 RX ORDER — CEFAZOLIN SODIUM IN 0.9 % NACL 3 G/100 ML
3 INTRAVENOUS SOLUTION, PIGGYBACK (ML) INTRAVENOUS SEE ADMIN INSTRUCTIONS
Status: DISCONTINUED | OUTPATIENT
Start: 2021-08-05 | End: 2021-08-05 | Stop reason: HOSPADM

## 2021-08-05 RX ORDER — HYDROMORPHONE HCL IN WATER/PF 6 MG/30 ML
0.4 PATIENT CONTROLLED ANALGESIA SYRINGE INTRAVENOUS
Status: DISCONTINUED | OUTPATIENT
Start: 2021-08-05 | End: 2021-08-05 | Stop reason: HOSPADM

## 2021-08-05 RX ORDER — ONDANSETRON 4 MG/1
4 TABLET, ORALLY DISINTEGRATING ORAL EVERY 30 MIN PRN
Status: DISCONTINUED | OUTPATIENT
Start: 2021-08-05 | End: 2021-08-05 | Stop reason: HOSPADM

## 2021-08-05 RX ORDER — ACETAMINOPHEN 325 MG/1
650 TABLET ORAL EVERY 4 HOURS PRN
Status: DISCONTINUED | OUTPATIENT
Start: 2021-08-08 | End: 2021-08-05 | Stop reason: HOSPADM

## 2021-08-05 RX ORDER — ONDANSETRON 2 MG/ML
4 INJECTION INTRAMUSCULAR; INTRAVENOUS EVERY 30 MIN PRN
Status: DISCONTINUED | OUTPATIENT
Start: 2021-08-05 | End: 2021-08-05 | Stop reason: HOSPADM

## 2021-08-05 RX ORDER — LIDOCAINE 40 MG/G
CREAM TOPICAL
Status: DISCONTINUED | OUTPATIENT
Start: 2021-08-05 | End: 2021-08-05 | Stop reason: HOSPADM

## 2021-08-05 RX ORDER — NALOXONE HYDROCHLORIDE 0.4 MG/ML
0.2 INJECTION, SOLUTION INTRAMUSCULAR; INTRAVENOUS; SUBCUTANEOUS
Status: DISCONTINUED | OUTPATIENT
Start: 2021-08-05 | End: 2021-08-05 | Stop reason: HOSPADM

## 2021-08-05 RX ORDER — CEPHALEXIN 500 MG/1
500 CAPSULE ORAL 4 TIMES DAILY
Qty: 4 CAPSULE | Refills: 0 | Status: SHIPPED | OUTPATIENT
Start: 2021-08-05 | End: 2021-08-06

## 2021-08-05 RX ORDER — OXYCODONE HYDROCHLORIDE 5 MG/1
5 TABLET ORAL EVERY 4 HOURS PRN
Status: DISCONTINUED | OUTPATIENT
Start: 2021-08-05 | End: 2021-08-05 | Stop reason: HOSPADM

## 2021-08-05 RX ORDER — CEFAZOLIN SODIUM IN 0.9 % NACL 3 G/100 ML
3 INTRAVENOUS SOLUTION, PIGGYBACK (ML) INTRAVENOUS
Status: COMPLETED | OUTPATIENT
Start: 2021-08-05 | End: 2021-08-05

## 2021-08-05 RX ORDER — GABAPENTIN 100 MG/1
100 CAPSULE ORAL
Status: COMPLETED | OUTPATIENT
Start: 2021-08-05 | End: 2021-08-05

## 2021-08-05 RX ORDER — OXYCODONE HYDROCHLORIDE 5 MG/1
5-10 TABLET ORAL EVERY 4 HOURS PRN
Qty: 12 TABLET | Refills: 0 | Status: SHIPPED | OUTPATIENT
Start: 2021-08-05 | End: 2021-08-08

## 2021-08-05 RX ORDER — PROPOFOL 10 MG/ML
INJECTION, EMULSION INTRAVENOUS PRN
Status: DISCONTINUED | OUTPATIENT
Start: 2021-08-05 | End: 2021-08-05

## 2021-08-05 RX ORDER — ONDANSETRON 2 MG/ML
INJECTION INTRAMUSCULAR; INTRAVENOUS PRN
Status: DISCONTINUED | OUTPATIENT
Start: 2021-08-05 | End: 2021-08-05

## 2021-08-05 RX ORDER — MAGNESIUM SULFATE 4 G/50ML
4 INJECTION INTRAVENOUS ONCE
Status: COMPLETED | OUTPATIENT
Start: 2021-08-05 | End: 2021-08-05

## 2021-08-05 RX ORDER — MEPERIDINE HYDROCHLORIDE 25 MG/ML
12.5 INJECTION INTRAMUSCULAR; INTRAVENOUS; SUBCUTANEOUS
Status: DISCONTINUED | OUTPATIENT
Start: 2021-08-05 | End: 2021-08-05 | Stop reason: HOSPADM

## 2021-08-05 RX ORDER — HYDROMORPHONE HYDROCHLORIDE 2 MG/1
4 TABLET ORAL EVERY 4 HOURS PRN
Status: DISCONTINUED | OUTPATIENT
Start: 2021-08-05 | End: 2021-08-05 | Stop reason: HOSPADM

## 2021-08-05 RX ORDER — BUPIVACAINE HYDROCHLORIDE 2.5 MG/ML
INJECTION, SOLUTION INFILTRATION; PERINEURAL PRN
Status: DISCONTINUED | OUTPATIENT
Start: 2021-08-05 | End: 2021-08-05 | Stop reason: HOSPADM

## 2021-08-05 RX ORDER — FENTANYL CITRATE 50 UG/ML
INJECTION, SOLUTION INTRAMUSCULAR; INTRAVENOUS PRN
Status: DISCONTINUED | OUTPATIENT
Start: 2021-08-05 | End: 2021-08-05

## 2021-08-05 RX ORDER — HYDROXYZINE HYDROCHLORIDE 10 MG/1
10 TABLET, FILM COATED ORAL EVERY 6 HOURS PRN
Qty: 30 TABLET | Refills: 0 | Status: SHIPPED | OUTPATIENT
Start: 2021-08-05 | End: 2022-05-03

## 2021-08-05 RX ORDER — ACETAMINOPHEN 325 MG/1
975 TABLET ORAL EVERY 8 HOURS
Status: DISCONTINUED | OUTPATIENT
Start: 2021-08-05 | End: 2021-08-05 | Stop reason: HOSPADM

## 2021-08-05 RX ORDER — FENTANYL CITRATE-0.9 % NACL/PF 10 MCG/ML
PLASTIC BAG, INJECTION (ML) INTRAVENOUS CONTINUOUS PRN
Status: DISCONTINUED | OUTPATIENT
Start: 2021-08-05 | End: 2021-08-05

## 2021-08-05 RX ORDER — HYDROMORPHONE HCL IN WATER/PF 6 MG/30 ML
0.2 PATIENT CONTROLLED ANALGESIA SYRINGE INTRAVENOUS
Status: DISCONTINUED | OUTPATIENT
Start: 2021-08-05 | End: 2021-08-05 | Stop reason: HOSPADM

## 2021-08-05 RX ADMIN — ROCURONIUM BROMIDE 40 MG: 10 INJECTION, SOLUTION INTRAVENOUS at 07:30

## 2021-08-05 RX ADMIN — Medication 10 MG: at 07:51

## 2021-08-05 RX ADMIN — GABAPENTIN 100 MG: 100 CAPSULE ORAL at 06:46

## 2021-08-05 RX ADMIN — DEXAMETHASONE SODIUM PHOSPHATE 10 MG: 4 INJECTION, SOLUTION INTRA-ARTICULAR; INTRALESIONAL; INTRAMUSCULAR; INTRAVENOUS; SOFT TISSUE at 07:30

## 2021-08-05 RX ADMIN — PROPOFOL 100 MCG/KG/MIN: 10 INJECTION, EMULSION INTRAVENOUS at 07:50

## 2021-08-05 RX ADMIN — Medication 25 MCG/MIN: at 08:12

## 2021-08-05 RX ADMIN — FENTANYL CITRATE 100 MCG: 50 INJECTION, SOLUTION INTRAMUSCULAR; INTRAVENOUS at 07:30

## 2021-08-05 RX ADMIN — Medication 5 MG: at 08:16

## 2021-08-05 RX ADMIN — KETAMINE HYDROCHLORIDE 25 MG: 10 INJECTION, SOLUTION INTRAMUSCULAR; INTRAVENOUS at 07:30

## 2021-08-05 RX ADMIN — LIDOCAINE HYDROCHLORIDE 30 MG: 20 INJECTION, SOLUTION INFILTRATION; PERINEURAL at 07:30

## 2021-08-05 RX ADMIN — GLYCOPYRROLATE 0.2 MG: 0.2 INJECTION, SOLUTION INTRAMUSCULAR; INTRAVENOUS at 08:24

## 2021-08-05 RX ADMIN — SUGAMMADEX 200 MG: 100 INJECTION, SOLUTION INTRAVENOUS at 09:33

## 2021-08-05 RX ADMIN — KETAMINE HYDROCHLORIDE 25 MG: 10 INJECTION, SOLUTION INTRAMUSCULAR; INTRAVENOUS at 07:50

## 2021-08-05 RX ADMIN — PROPOFOL 160 MG: 10 INJECTION, EMULSION INTRAVENOUS at 07:30

## 2021-08-05 RX ADMIN — Medication 3 G: at 07:24

## 2021-08-05 RX ADMIN — Medication 10 MG: at 07:53

## 2021-08-05 RX ADMIN — ACETAMINOPHEN 975 MG: 325 TABLET ORAL at 10:40

## 2021-08-05 RX ADMIN — ROCURONIUM BROMIDE 10 MG: 10 INJECTION, SOLUTION INTRAVENOUS at 07:50

## 2021-08-05 RX ADMIN — HYDROMORPHONE HYDROCHLORIDE 0.2 MG: 0.2 INJECTION, SOLUTION INTRAMUSCULAR; INTRAVENOUS; SUBCUTANEOUS at 10:13

## 2021-08-05 RX ADMIN — MAGNESIUM SULFATE HEPTAHYDRATE 4 G: 80 INJECTION, SOLUTION INTRAVENOUS at 06:27

## 2021-08-05 RX ADMIN — PHENYLEPHRINE HYDROCHLORIDE 100 MCG: 10 INJECTION INTRAVENOUS at 07:55

## 2021-08-05 RX ADMIN — FENTANYL CITRATE 25 MCG: 50 INJECTION, SOLUTION INTRAMUSCULAR; INTRAVENOUS at 10:10

## 2021-08-05 RX ADMIN — PHENYLEPHRINE HYDROCHLORIDE 100 MCG: 10 INJECTION INTRAVENOUS at 08:17

## 2021-08-05 RX ADMIN — OXYCODONE HYDROCHLORIDE 5 MG: 5 TABLET ORAL at 10:41

## 2021-08-05 RX ADMIN — SODIUM CHLORIDE, POTASSIUM CHLORIDE, SODIUM LACTATE AND CALCIUM CHLORIDE: 600; 310; 30; 20 INJECTION, SOLUTION INTRAVENOUS at 09:04

## 2021-08-05 RX ADMIN — HYDROMORPHONE HYDROCHLORIDE 0.5 MG: 1 INJECTION, SOLUTION INTRAMUSCULAR; INTRAVENOUS; SUBCUTANEOUS at 07:45

## 2021-08-05 RX ADMIN — PHENYLEPHRINE HYDROCHLORIDE 100 MCG: 10 INJECTION INTRAVENOUS at 08:07

## 2021-08-05 RX ADMIN — ONDANSETRON 4 MG: 2 INJECTION INTRAMUSCULAR; INTRAVENOUS at 07:50

## 2021-08-05 RX ADMIN — SODIUM CHLORIDE, POTASSIUM CHLORIDE, SODIUM LACTATE AND CALCIUM CHLORIDE: 600; 310; 30; 20 INJECTION, SOLUTION INTRAVENOUS at 06:30

## 2021-08-05 ASSESSMENT — ENCOUNTER SYMPTOMS: DYSRHYTHMIAS: 1

## 2021-08-05 ASSESSMENT — MIFFLIN-ST. JEOR: SCORE: 1924.24

## 2021-08-05 NOTE — PHARMACY-ADMISSION MEDICATION HISTORY
Pharmacy Note - Admission Medication History    Pertinent Provider Information:    ______________________________________________________________________    Prior To Admission (PTA) med list completed and updated in EMR.       PTA Med List   Medication Sig Note Last Dose     aspirin (ASA) 325 MG EC tablet Take 325 mg by mouth daily  2021     atorvastatin (LIPITOR) 80 MG tablet [ATORVASTATIN (LIPITOR) 80 MG TABLET] TAKE 1 TABLET AT BEDTIME (Patient taking differently: Take 80 mg by mouth At Bedtime [ATORVASTATIN (LIPITOR) 80 MG TABLET] TAKE 1 TABLET AT BEDTIME)  2021     carvediloL (COREG) 3.125 MG tablet [CARVEDILOL (COREG) 3.125 MG TABLET] Take 3 tablets (9.25 mg total) by mouth 2 (two) times a day with meals.  2021 at Unknown time     celecoxib (CELEBREX) 200 MG capsule [CELECOXIB (CELEBREX) 200 MG CAPSULE] Take 1 capsule by mouth daily.  2021     ezetimibe (ZETIA) 10 mg tablet [EZETIMIBE (ZETIA) 10 MG TABLET] TAKE 1 TABLET DAILY (Patient taking differently: Take 10 mg by mouth daily )  2021     glucosam-msm-chond-hrb149-hyal (GLUCOS-CHOND-MSM, WITH ANTIOX,) 500-500-66.7 mg Tab Take 1-2 tablets by mouth daily   8/3/2021     hydroCHLOROthiazide (HYDRODIURIL) 25 MG tablet [HYDROCHLOROTHIAZIDE (HYDRODIURIL) 25 MG TABLET] Take 25 mg by mouth daily.   2021     nitroGLYcerin (NITROSTAT) 0.4 MG sublingual tablet Place 0.4 mg under the tongue every 5 minutes as needed  2021: Needs new bottle,  prn     venlafaxine (EFFEXOR-XR) 75 MG 24 hr capsule Take 75 mg by mouth daily  2021       Information source(s): Patient    Method of interview communication: in-person    Patient was asked about OTC/herbal products specifically.  PTA med list reflects this.    Based on the pharmacist's assessment, the PTA med list information appears reliable    Allergies were reviewed, assessed, and updated with the patient.      Patient does not use any multi-dose medications prior to admission.     Thank  you for the opportunity to participate in the care of this patient.      Jaycee Trujillo [facility]     8/5/2021     6:32 AM

## 2021-08-05 NOTE — ANESTHESIA POSTPROCEDURE EVALUATION
Patient: Cem Kevin    Procedure(s):  BILATERAL LUMBAR 3-4 LATERAL RECESS DECOMPRESSION    Diagnosis:Back pain [M54.9]  Diagnosis Additional Information: No value filed.    Anesthesia Type:  General    Note:  Disposition: Outpatient   Postop Pain Control: Uneventful            Sign Out: Well controlled pain   PONV: No   Neuro/Psych: Uneventful            Sign Out: Acceptable/Baseline neuro status   Airway/Respiratory: Uneventful            Sign Out: Acceptable/Baseline resp. status   CV/Hemodynamics: Uneventful            Sign Out: Acceptable CV status; No obvious hypovolemia; No obvious fluid overload   Other NRE: NONE   DID A NON-ROUTINE EVENT OCCUR? No           Last vitals:  Vitals Value Taken Time   /71 08/05/21 1030   Temp 36.6  C (97.8  F) 08/05/21 0945   Pulse 69 08/05/21 1041   Resp 0 08/05/21 1020   SpO2 93 % 08/05/21 1041   Vitals shown include unvalidated device data.    Electronically Signed By: Mario Egan MD  August 5, 2021  10:45 AM

## 2021-08-05 NOTE — BRIEF OP NOTE
[facility]    Brief Operative Note    Pre-operative diagnosis: Spinal stenosis L3/4  Post-operative diagnosis Same as pre-operative diagnosis    Procedure: Procedure(s):  BILATERAL LUMBAR 3-4 LATERAL RECESS DECOMPRESSION  Surgeon: Surgeon(s) and Role:     * Eliazar Mace MD - Primary  Anesthesia: General   Estimated blood loss: Less than 50 ml  Drains: None  Specimens: * No specimens in log *  Findings:   None.  Complications: None.  Implants: * No implants in log *

## 2021-08-05 NOTE — OR NURSING
Pt voided 200 ml clear yellow urine in urinal.  O2 sats 96% on room air, ok to discharge per Dr Egan.

## 2021-08-05 NOTE — ANESTHESIA PREPROCEDURE EVALUATION
Anesthesia Pre-Procedure Evaluation    Patient: Cem Kevin   MRN: [id number] : 1945        Preoperative Diagnosis: Back pain [M54.9]   Procedure : Procedure(s):  BILATERAL LUMBAR 3-4 LATERAL RECESS DECOMPRESSION     Past Medical History:   Diagnosis Date     Angina pectoris (H)     per h&p     Hypertension     per H&P     Pure hypercholesterolemia     per H&P      Past Surgical History:   Procedure Laterality Date     CV CORONARY ANGIOGRAM N/A 2019    Procedure: Coronary Angiogram;  Surgeon: Andria Morrison MD;  Location: [facility] Cath Lab;  Service: Cardiology     CV LEFT HEART CATHETERIZATION WITH LEFT VENTRICULOGRAM N/A 2019    Procedure: Left Heart Catheterization with Left Ventriculogram;  Surgeon: Andria Morrison MD;  Location: [facility] Cath Lab;  Service: Cardiology      Allergies   Allergen Reactions     Vasotec [Enalapril] Angioedema and Swelling     IV form only per patient report      Social History     Tobacco Use     Smoking status: Former Smoker     Smokeless tobacco: Never Used     Tobacco comment: Quit 12 years ago   Substance Use Topics     Alcohol use: Yes     Alcohol/week: 1.0 standard drinks     Comment: Alcoholic Drinks/day: 2-3 glasses per month'      Wt Readings from Last 1 Encounters:   21 120.2 kg (265 lb)        Anesthesia Evaluation            ROS/MED HX  ENT/Pulmonary:  - neg pulmonary ROS     Neurologic:  - neg neurologic ROS     Cardiovascular: Comment: Cleared by cardiology with cardiac risk<1%    coronary angiogram with PCI within the past 2 years, a stress test that was negative for ischemia within the past 1.5 years    TTE EF=45%    (+) hypertension--CAD ---dysrhythmias ( Paroxysmal VT ),     METS/Exercise Tolerance:     Hematologic:  - neg hematologic  ROS     Musculoskeletal:  - neg musculoskeletal ROS     GI/Hepatic:  - neg GI/hepatic ROS     Renal/Genitourinary:  - neg Renal ROS     Endo:     (+) Obesity,     Psychiatric/Substance Use:  -  neg psychiatric ROS     Infectious Disease:  - neg infectious disease ROS     Malignancy:  - neg malignancy ROS     Other:  - neg other ROS          Physical Exam    Airway  airway exam normal      Mallampati: II       Respiratory Devices and Support         Dental  no notable dental history       B=Bridge, C=Chipped, L=Loose, M=Missing    Cardiovascular   cardiovascular exam normal       Rhythm and rate: regular and normal     Pulmonary   pulmonary exam normal        breath sounds clear to auscultation           OUTSIDE LABS:  CBC:   Lab Results   Component Value Date    WBC 5.6 07/27/2021    WBC 8.9 05/21/2020    HGB 15.3 07/27/2021    HGB 16.0 05/21/2020    HCT 47.8 07/27/2021    HCT 48.0 05/21/2020     07/27/2021     05/21/2020     BMP:   Lab Results   Component Value Date     07/27/2021     05/21/2020    POTASSIUM 4.4 07/27/2021    POTASSIUM 3.6 05/21/2020    CHLORIDE 101 07/27/2021    CHLORIDE 104 05/21/2020    CO2 29 07/27/2021    CO2 24 05/21/2020    BUN 18 07/27/2021    BUN 19 05/21/2020    CR 1.02 07/27/2021    CR 1.07 05/21/2020     07/27/2021     05/21/2020     COAGS: No results found for: PTT, INR, FIBR  POC: No results found for: BGM, HCG, HCGS  HEPATIC:   Lab Results   Component Value Date    ALBUMIN 3.7 07/27/2021    PROTTOTAL 6.7 07/27/2021    ALT 22 07/27/2021    AST 21 07/27/2021    ALKPHOS 78 07/27/2021    BILITOTAL 0.7 07/27/2021     OTHER:   Lab Results   Component Value Date    JACINTA 9.5 07/27/2021    MAG 2.0 01/25/2020    TSH 1.71 07/17/2018       Anesthesia Plan    ASA Status:  2      Anesthesia Type: General.     - Airway: ETT   Induction: Intravenous.   Maintenance: Inhalation.        Consents    Anesthesia Plan(s) and associated risks, benefits, and realistic alternatives discussed. Questions answered and patient/representative(s) expressed understanding.     - Discussed with:  Patient      - Extended Intubation/Ventilatory Support Discussed: No.       - Patient is DNR/DNI Status: No         Postoperative Care    Pain management: IV analgesics, Multi-modal analgesia.   PONV prophylaxis: Ondansetron (or other 5HT-3), Dexamethasone or Solumedrol     Comments:    CAROLINE  Gabapentin pre op  Antiemetics  Magnesium infusion perioperatively  Ketamine after induction  toradol at the end of the case if okay with the surgeon            Mario Egan MD

## 2021-08-05 NOTE — OP NOTE
NAME: Cem Kevin  : 1945    Procedure Date: 2021    PREOPERATIVE DIAGNOSIS:  Bilateral L3-4 lateral recess encroachment.    POSTOPERATIVE DIAGNOSIS:  Bilateral L3-4 lateral recess encroachment.    PROCEDURE PERFORMED:    1.  Right L3 hemilaminotomy with right L3-4 partial medial facetectomy.  3.  Left L3-4 partial medial facetectomy.  4.  Use of operative microscopy.    ATTENDING SURGEON:  Eliazar Mace.    ASSISTANT:  Todd Holter, PA-C.    Please note this procedure technically required an assistant for the safety of the patient.   Holter is an experienced PA in spinal surgery.  Assistance was imperative and necessary to safely protect surrounding neural structures as I performed decompressive phase of the procedure.    ANESTHESIA:  General endotracheal anesthesia.    ESTIMATED BLOOD LOSS:  Less than 50 mL.    PACKS:  None.     DRAINS:  None.    COMPLICATIONS:  None.    CONDITION:  Stable.    DISPOSITION:  Postanesthesia care unit.    INDICATIONS FOR PROCEDURE:  Mr. Kevin is a pleasant 76-year-old gentleman who presented to my office with complaints of significant bilateral lower extremity radicular symptoms.  MRI demonstrated evidence of spinal stenosis at L3-4 with particular lateral recess encroachment on the right.  An exhaustive course of conservative care have been attempted but was unsuccessful.  The patient opted for surgical intervention.  Detailed description of the risks, benefits, and treatment alternatives inherent to the operation was explained in advance to the patient including but not limited to failure to improve, cerebrospinal fluid leakage, hemorrhage, infection, permanent or transient nerve root damage, need for future surgery, adjacent segment disease or recurrence.  Despite these risks, he elected to proceed.  He was then seen by his primary care physician and scheduled for surgery.    DESCRIPTION OF PROCEDURE:  The patient was met in the preoperative holding area  by myself, at which time the consent was reviewed and signed.  Site was marked on the patient's back.  He was then seen by the Anesthesia Service and escorted back to the operating room.  Upon arrival in the operating room, patient was intubated by the Anesthesia Service without complication and then flipped from supine to prone position on the Lalo frame.  Special attention paid to padding bony prominences or superficial nerves.  His abdomen and genitals were found to be hanging free.  The patient's back was then prepped and draped in normal sterile fashion.  At this point, a timeout was called to ensure the proper procedure to be performed as well as the administration of prophylactic antibiotics.  Once it was completed, the procedure began.  C-arm fluoroscopic imaging was brought in lateral plane where lateral fluoroscopic imaging we advanced the spinal needle through the skin to approximate the level of the L3-4 disk space.  Once this was confirmed, skin was marked.  Needle was removed.  A midline incision was made extending between spinous processes of L3 and 4 and carried down through subcutaneous tissue and underlying fat.  Deep fascia was incised longitudinally.  Subperiosteal dissection was carried out of the right hemilamina of L3 out to the facet joint at L3-4.  A high speed bur was used to make a permanent martha at the inferior border of the right hemilamina of L3 and a lateral fluoroscopic image confirmed the correct operative level with the marker in place.  C-arm was moved and the operating microscope was draped and brought in.  A self-retaining retractor was placed and under operative microscopy we identified our permanent martha as well as the pars intraarticularis at L3, which was protected throughout the procedure.  We used a high-speed bur to perform hemilaminotomy and partial medial facetectomy, which was completed with 2 and 3 mm Kerrisons.  We resected any residual ligamentum flavum.  We did  encounter some of the scar from the previous more caudad decompression.  We were able to safely release this from the lateral border of the thecal sac.  We removed any redundant ligamentum flavum from within the subarticular zone and decompressed laterally until we could easily palpate the medial border of the L4 pedicle.  At this point, we gently retracted the thecal sac and lateral to medial direction.  We did expose the small protuberant annulus.  We were able to incise the outer annulus and teased out small fragments of disk, but no large fragment of disk.  There was a posterior osteophyte off of the L3 vertebral body, but it was not causing any significant ventral compression.  When thecal sac on the right sided hemicanal regained its normal shape and pulsatile flow and a probe could be placed out under the pedicle without obstruction, decompression was felt to be complete.  At this point, we angled the operating table away from myself as well as the operating microscope, so we could reach over to the left side.  We removed the base of the spinous process with a high speed bur and then removed any redundant ligamentum flavum from within the left lateral recess with 2 and 3 mm Kerrisons, which over the dorsal aspect of the thecal sac into the lateral recess and subarticular zone on the left, 2 and 3 mm Kerrison was used to perform a partial facetectomy until we were flushed with the medial border of the L4 pedicle on the left side.  When a ball tip Condon probe could be placed out under the pedicle and out the foramen without obstruction, the decompression was felt to be complete.  At this point, we maintained meticulous hemostasis after copiously irrigating with normal saline.  Hemostasis maintained with bipolar electrocautery and thrombin paste as well as tamponade.  Once we had successfully achieved hemostasis, we irrigated once again and removed the retractor and closed in layers beginning with the deep  fascia closed with #1 Vicryl suture.  Subcutaneous tissue was closed with 2-0 interrupted Vicryl suture.  Skin was closed with a running subcuticular 4-0 stitch.  Wound was then cleaned and dried in normal fashion.  Steri-Strips and gauze were applied.  Drapes were removed.  The patient was transferred from the supine position, at which point he was extubated by the Anesthesia Service without complication.  All needle and sponge counts were correct at the end of the case.        Eliazar Mace MD        D: 2021   T: 2021   MT: sd    Name:     MARION LESLIE  MRN:      [id number]        Account:        [id number]   :      1945           Procedure Date: 2021     Document: [id number]

## 2021-08-05 NOTE — ANESTHESIA CARE TRANSFER NOTE
Patient: Cem Kevin    Procedure(s):  BILATERAL LUMBAR 3-4 LATERAL RECESS DECOMPRESSION    Diagnosis: Back pain [M54.9]  Diagnosis Additional Information: No value filed.    Anesthesia Type:   General     Note:    Oropharynx: oropharynx clear of all foreign objects  Level of Consciousness: awake    Level of Supplemental Oxygen (L/min / FiO2): 6  Independent Airway: airway patency satisfactory and stable  Dentition: dentition unchanged  Vital Signs Stable: post-procedure vital signs reviewed and stable  Report to RN Given: handoff report given  Patient transferred to: PACU    Handoff Report: Identifed the Patient, Identified the Reponsible Provider, Reviewed the pertinent medical history, Discussed the surgical course, Reviewed Intra-OP anesthesia mangement and issues during anesthesia, Set expectations for post-procedure period and Allowed opportunity for questions and acknowledgement of understanding      Vitals:  Vitals Value Taken Time   /72 08/05/21 0947   Temp 36.6  C (97.8  F) 08/05/21 0945   Pulse 70 08/05/21 0953   Resp 21 08/05/21 0953   SpO2 96 % 08/05/21 0953   Vitals shown include unvalidated device data.    Electronically Signed By: JAN Wang CRNA  August 5, 2021  9:54 AM

## 2021-08-05 NOTE — ANESTHESIA PROCEDURE NOTES
Airway       Patient location during procedure: OR       Procedure Start/Stop Times: 8/5/2021 7:35 AM  Staff -        CRNA: Zander Mcguire APRN CRNA       Performed By: CRNAIndications and Patient Condition       Indications for airway management: eysenia-procedural         Mask difficulty assessment: 1 - vent by mask    Final Airway Details       Final airway type: endotracheal airway       Successful airway: ETT - single  Endotracheal Airway Details        ETT size (mm): 7.5       Cuffed: yes       Successful intubation technique: direct laryngoscopy       DL Blade Type: Valente 2       Grade View of Cords: 1       Adjucts: stylet       Position: Right       Measured from: lips       Secured at (cm): 24       Bite block used: None    Post intubation assessment        Placement verified by: capnometry, equal breath sounds and chest rise        Number of attempts at approach: 1       Number of other approaches attempted: 0       Secured with: silk tape       Ease of procedure: easy       Dentition: Intact and Unchanged

## 2021-08-23 ENCOUNTER — DOCUMENTATION ONLY (OUTPATIENT)
Dept: OTHER | Facility: CLINIC | Age: 76
End: 2021-08-23

## 2021-10-18 DIAGNOSIS — I10 ESSENTIAL HYPERTENSION: ICD-10-CM

## 2021-10-18 RX ORDER — CARVEDILOL 3.12 MG/1
9.25 TABLET ORAL 2 TIMES DAILY WITH MEALS
Qty: 540 TABLET | Refills: 1 | Status: SHIPPED | OUTPATIENT
Start: 2021-10-18 | End: 2022-04-18

## 2021-11-02 ENCOUNTER — VIRTUAL VISIT (OUTPATIENT)
Dept: CARDIOLOGY | Facility: CLINIC | Age: 76
End: 2021-11-02
Payer: COMMERCIAL

## 2021-11-02 DIAGNOSIS — Z00.6 EXAMINATION OF PARTICIPANT OR CONTROL IN CLINICAL RESEARCH: Primary | ICD-10-CM

## 2021-11-02 PROCEDURE — 99207 PR NO CHARGE-RESEARCH SERVICE: CPT

## 2021-11-02 NOTE — LETTER
11/2/2021    Bryson Worthington MD  [facility] [address]  [facility] [address]    RE: Cem Kevin       Dear Colleague,    I had the pleasure of seeing Cem Kevin in the [facility].    The Vesalius Study: A Double-blind, Randomized, Placebo-controlled, Multicenter Study to Evaluate the Impact of Evolocumab on Major  Cardiovascular Events in Patients at High Cardiovascular Risk Without Prior Myocardial Infarction or Stroke    Spoke with potential candidate for above study today for a prescreening call visit.     Review study details, requirements and eligibility with caller.      Plan: Subject does not appear to be a good fit for this study, thanked them for their time and will keep in mind for future studies.    Shana Martinez RN    Clinical Trials Nurse        Thank you for allowing me to participate in the care of your patient.      Sincerely,     Shana Martinez RN     [facility]  cc:   No referring provider defined for this encounter.

## 2021-11-02 NOTE — PROGRESS NOTES
The Vesalius Study: A Double-blind, Randomized, Placebo-controlled, Multicenter Study to Evaluate the Impact of Evolocumab on Major  Cardiovascular Events in Patients at High Cardiovascular Risk Without Prior Myocardial Infarction or Stroke    Spoke with potential candidate for above study today for a prescreening call visit.     Review study details, requirements and eligibility with caller.      Plan: Subject does not appear to be a good fit for this study, thanked them for their time and will keep in mind for future studies.    Shana Martinez RN    Clinical Trials Nurse

## 2022-04-01 ENCOUNTER — LAB REQUISITION (OUTPATIENT)
Dept: LAB | Facility: CLINIC | Age: 77
End: 2022-04-01
Payer: COMMERCIAL

## 2022-04-01 DIAGNOSIS — Z12.5 ENCOUNTER FOR SCREENING FOR MALIGNANT NEOPLASM OF PROSTATE: ICD-10-CM

## 2022-04-01 DIAGNOSIS — I10 ESSENTIAL (PRIMARY) HYPERTENSION: ICD-10-CM

## 2022-04-01 LAB
ALBUMIN SERPL-MCNC: 4 G/DL (ref 3.5–5)
ALP SERPL-CCNC: 87 U/L (ref 45–120)
ALT SERPL W P-5'-P-CCNC: 27 U/L (ref 0–45)
ANION GAP SERPL CALCULATED.3IONS-SCNC: 13 MMOL/L (ref 5–18)
AST SERPL W P-5'-P-CCNC: 21 U/L (ref 0–40)
BILIRUB SERPL-MCNC: 0.9 MG/DL (ref 0–1)
BUN SERPL-MCNC: 22 MG/DL (ref 8–28)
CALCIUM SERPL-MCNC: 9.3 MG/DL (ref 8.5–10.5)
CHLORIDE BLD-SCNC: 101 MMOL/L (ref 98–107)
CO2 SERPL-SCNC: 27 MMOL/L (ref 22–31)
CREAT SERPL-MCNC: 1.12 MG/DL (ref 0.7–1.3)
GFR SERPL CREATININE-BSD FRML MDRD: 69 ML/MIN/1.73M2
GLUCOSE BLD-MCNC: 94 MG/DL (ref 70–125)
PHQ9 SCORE: 11
POTASSIUM BLD-SCNC: 4.3 MMOL/L (ref 3.5–5)
PROT SERPL-MCNC: 6.9 G/DL (ref 6–8)
PSA SERPL-MCNC: 3.14 UG/L (ref 0–6.5)
SODIUM SERPL-SCNC: 141 MMOL/L (ref 136–145)

## 2022-04-01 PROCEDURE — 80053 COMPREHEN METABOLIC PANEL: CPT | Mod: ORL | Performed by: FAMILY MEDICINE

## 2022-04-01 PROCEDURE — G0103 PSA SCREENING: HCPCS | Mod: ORL | Performed by: FAMILY MEDICINE

## 2022-05-03 ENCOUNTER — TELEPHONE (OUTPATIENT)
Dept: CARDIOLOGY | Facility: CLINIC | Age: 77
End: 2022-05-03

## 2022-05-03 ENCOUNTER — OFFICE VISIT (OUTPATIENT)
Dept: CARDIOLOGY | Facility: CLINIC | Age: 77
End: 2022-05-03
Payer: COMMERCIAL

## 2022-05-03 VITALS
HEART RATE: 64 BPM | WEIGHT: 274.8 LBS | SYSTOLIC BLOOD PRESSURE: 124 MMHG | HEIGHT: 70 IN | RESPIRATION RATE: 16 BRPM | DIASTOLIC BLOOD PRESSURE: 80 MMHG | BODY MASS INDEX: 39.34 KG/M2

## 2022-05-03 DIAGNOSIS — I50.22 CHRONIC SYSTOLIC CONGESTIVE HEART FAILURE (H): ICD-10-CM

## 2022-05-03 DIAGNOSIS — E78.00 PURE HYPERCHOLESTEROLEMIA: ICD-10-CM

## 2022-05-03 DIAGNOSIS — E66.812 CLASS 2 OBESITY DUE TO EXCESS CALORIES WITHOUT SERIOUS COMORBIDITY IN ADULT, UNSPECIFIED BMI: Primary | ICD-10-CM

## 2022-05-03 DIAGNOSIS — I25.5 ISCHEMIC CARDIOMYOPATHY: ICD-10-CM

## 2022-05-03 DIAGNOSIS — I10 ESSENTIAL HYPERTENSION: ICD-10-CM

## 2022-05-03 DIAGNOSIS — R06.83 SNORING: ICD-10-CM

## 2022-05-03 DIAGNOSIS — I25.83 CORONARY ARTERY DISEASE DUE TO LIPID RICH PLAQUE: Primary | ICD-10-CM

## 2022-05-03 DIAGNOSIS — R06.09 DYSPNEA ON EXERTION: ICD-10-CM

## 2022-05-03 DIAGNOSIS — E66.09 CLASS 2 OBESITY DUE TO EXCESS CALORIES WITHOUT SERIOUS COMORBIDITY IN ADULT, UNSPECIFIED BMI: ICD-10-CM

## 2022-05-03 DIAGNOSIS — E66.812 CLASS 2 OBESITY DUE TO EXCESS CALORIES WITHOUT SERIOUS COMORBIDITY IN ADULT, UNSPECIFIED BMI: ICD-10-CM

## 2022-05-03 DIAGNOSIS — I25.10 CORONARY ARTERY DISEASE DUE TO LIPID RICH PLAQUE: Primary | ICD-10-CM

## 2022-05-03 DIAGNOSIS — E66.09 CLASS 2 OBESITY DUE TO EXCESS CALORIES WITHOUT SERIOUS COMORBIDITY IN ADULT, UNSPECIFIED BMI: Primary | ICD-10-CM

## 2022-05-03 PROBLEM — E78.5 HYPERLIPIDEMIA: Status: RESOLVED | Noted: 2021-02-11 | Resolved: 2022-05-03

## 2022-05-03 LAB
BNP SERPL-MCNC: 13 PG/ML (ref 0–74)
HGB BLD-MCNC: 15.1 G/DL (ref 13.3–17.7)
LDLC SERPL CALC-MCNC: 84 MG/DL
TSH SERPL DL<=0.005 MIU/L-ACNC: 1.63 UIU/ML (ref 0.3–5)

## 2022-05-03 PROCEDURE — 99214 OFFICE O/P EST MOD 30 MIN: CPT | Performed by: INTERNAL MEDICINE

## 2022-05-03 PROCEDURE — 85018 HEMOGLOBIN: CPT | Performed by: INTERNAL MEDICINE

## 2022-05-03 PROCEDURE — 36415 COLL VENOUS BLD VENIPUNCTURE: CPT | Performed by: INTERNAL MEDICINE

## 2022-05-03 PROCEDURE — 83880 ASSAY OF NATRIURETIC PEPTIDE: CPT | Performed by: INTERNAL MEDICINE

## 2022-05-03 PROCEDURE — 83721 ASSAY OF BLOOD LIPOPROTEIN: CPT | Performed by: INTERNAL MEDICINE

## 2022-05-03 PROCEDURE — 84443 ASSAY THYROID STIM HORMONE: CPT | Performed by: INTERNAL MEDICINE

## 2022-05-03 RX ORDER — CELECOXIB 200 MG/1
200 CAPSULE ORAL PRN
COMMUNITY
Start: 2022-03-21

## 2022-05-03 RX ORDER — VENLAFAXINE 37.5 MG/1
37.5 TABLET ORAL 2 TIMES DAILY
COMMUNITY
End: 2023-05-12 | Stop reason: DRUGHIGH

## 2022-05-03 RX ORDER — NITROGLYCERIN 0.4 MG/1
0.4 TABLET SUBLINGUAL EVERY 5 MIN PRN
Qty: 20 TABLET | Refills: 4 | Status: SHIPPED | OUTPATIENT
Start: 2022-05-03

## 2022-05-03 NOTE — TELEPHONE ENCOUNTER
----- Message from Suresh Lee MD sent at 5/3/2022 11:29 AM CDT -----  Can we set up for PFTs as well as sleep study for obese gentleman with short of breath?  LF          ==orders placed for PFT's + sleep medicine referral. LM with patient that this was done and sleep medicine number provided should he not hear back in a timely manner. -[facility]

## 2022-05-03 NOTE — LETTER
5/3/2022    Bryson Worthington MD  [facility] [address]  [facility] [address]    RE: Cem Kevin       Dear Colleague,     I had the pleasure of seeing Cem Kevin in the [facility].      [facility]  Heart Care Clinic Follow-up Note    Assessment & Plan        (I25.10,  I25.83) Coronary artery disease due to lipid rich plaque  (primary encounter diagnosis)  Comment: Due to what sounds like accelerated angina he underwent angiography December 2019 showing left main distal 25% stenosis, proximal LAD 20% stenosis with a mid 70% lesion which was intervened upon, first diagonal 25% stenosis. Circumflex had an ostial 40% stenosis with the second obtuse marginal artery 40% stenosis, right coronary artery proximal 20%, mid 20% and distal 20%.    Still with atypical shortness of breath and will perform pharmacological stress nuclear and if medium or large sized ischemia pursue angiography.      (I10) Essential hypertension  Comment: Under good control on HCTZ and carvedilol.    (E78.00) Pure hypercholesterolemia  Comment: Has not been checked in some time, will check direct LDL today.    (I25.5) Ischemic cardiomyopathy  Comment: Ejection fraction 45% and is now on carvedilol rather than amlodipine.  Check BNP as well as repeat nuclear stress test for ejection fraction.  Consider changing HCTZ over to furosemide.    (I50.22) Chronic systolic congestive heart failure (H)  Comment: As above shortness of breath no significant signs or symptoms on exam.  Check BNP and if elevated switch HCTZ to furosemide.  Check TSH given shortness of breath as well as hemoglobin.    (E66.09) Class 2 obesity due to excess calories without serious comorbidity in adult, unspecified BMI  Comment: Work on weight loss.  Consider PFTs as well as sleep study.    Plan  1.  Given shortness of breath will check BNP, hemoglobin, TSH and address if abnormal concerns  2.  Acute on chronic rhinitis and  "shortness of breath follow-up.  We will arrange for repeat pharmacological stress nuclear.  3.  Weight loss.  4.  PFTs as well as sleep study.  5.  Follow-up me 1 year or sooner if needed based on above test.    Subjective  CC: 76-year-old white gentleman here for follow-up visit.  Still short of breath with minimal activity.  Still possibly snoring at night.  General pain  Orthopnea, syncope, dizziness, chest pain chest discomfort or palpitations or peripheral edema.  Does have occasional abnormal chest discomfort.    Medications  Current Outpatient Medications   Medication Sig Dispense Refill     aspirin (ASA) 325 MG EC tablet Take 325 mg by mouth every 6 hours as needed for moderate pain       atorvastatin (LIPITOR) 80 MG tablet [ATORVASTATIN (LIPITOR) 80 MG TABLET] TAKE 1 TABLET AT BEDTIME (Patient taking differently: Take 80 mg by mouth At Bedtime [ATORVASTATIN (LIPITOR) 80 MG TABLET] TAKE 1 TABLET AT BEDTIME) 90 tablet 3     carvedilol (COREG) 3.125 MG tablet TAKE 3 TABLETS TWICE A DAY WITH MEALS 540 tablet 0     celecoxib (CELEBREX) 200 MG capsule Take 200 mg by mouth as needed       ezetimibe (ZETIA) 10 MG tablet TAKE 1 TABLET DAILY 90 tablet 1     glucosam-msm-chond-hrb149-hyal (GLUCOS-CHOND-MSM, WITH ANTIOX,) 500-500-66.7 mg Tab Take 1-2 tablets by mouth daily        hydroCHLOROthiazide (HYDRODIURIL) 25 MG tablet [HYDROCHLOROTHIAZIDE (HYDRODIURIL) 25 MG TABLET] Take 25 mg by mouth daily.        Magnesium Cl-Calcium Carbonate (SLOW-MAG PO) Take 2 tablets by mouth daily       nitroGLYcerin (NITROSTAT) 0.4 MG sublingual tablet Place 1 tablet (0.4 mg) under the tongue every 5 minutes as needed 20 tablet 4     venlafaxine (EFFEXOR) 37.5 MG tablet Take 37.5 mg by mouth 2 times daily       venlafaxine (EFFEXOR-XR) 75 MG 24 hr capsule Take 75 mg by mouth daily         Objective  /80 (BP Location: Right arm, Patient Position: Sitting, Cuff Size: Adult Large)   Pulse 64   Resp 16   Ht 1.778 m (5' 10\")   Wt " 124.6 kg (274 lb 12.8 oz)   BMI 39.43 kg/m      General Appearance:    Alert, cooperative, no distress, appears stated age   Head:    Normocephalic, without obvious abnormality, atraumatic   Throat:   Lips, mucosa, and tongue normal; teeth and gums normal   Neck:   Supple, symmetrical, trachea midline, no adenopathy;        thyroid:  No enlargement/tenderness/nodules; no carotid    bruit or JVD   Back:     Symmetric, no curvature, ROM normal, no CVA tenderness   Lungs:     Clear to auscultation bilaterally, respirations unlabored   Chest wall:    No tenderness or deformity   Heart:    Regular rate and rhythm, S1 and S2 normal, no murmur, rub   or gallop   Abdomen:     Soft, non-tender, bowel sounds active all four quadrants,     no masses, no organomegaly   Extremities:   Normal, atraumatic, no cyanosis or edema   Pulses:   2+ and symmetric all extremities   Skin:   Skin color, texture, turgor normal, no rashes or lesions     Results    Lab Results personally reviewed   Lab Results   Component Value Date    CHOL 244 (H) 07/27/2021    CHOL 164 01/29/2021     Lab Results   Component Value Date    HDL 37 (L) 07/27/2021    HDL 38 (L) 01/29/2021     No components found for: LDLCALC  Lab Results   Component Value Date    TRIG 410 (H) 07/27/2021    TRIG 241 (H) 01/29/2021     Lab Results   Component Value Date    WBC 5.6 07/27/2021    HGB 15.3 07/27/2021    HCT 47.8 07/27/2021     07/27/2021     Lab Results   Component Value Date    BUN 22 04/01/2022     04/01/2022    CO2 27 04/01/2022               Thank you for allowing me to participate in the care of your patient.    Sincerely,     OCTAVIO LEE MD      Heart Care  cc:   Octavio Lee MD  [address] [place] [id number]

## 2022-05-03 NOTE — PATIENT INSTRUCTIONS
Mr Priest EBONY Kevin,  I enjoyed visiting with you again today.  I am still concerned shortness of breath.  Per our conversation I will check the blood work as well as a stress test and adjust medicines if needed.  I will plan on seeing you 1 year or sooner if needed.  Suresh Lee

## 2022-05-03 NOTE — PROGRESS NOTES
[facility]  Heart Care Clinic Follow-up Note    Assessment & Plan        (I25.10,  I25.83) Coronary artery disease due to lipid rich plaque  (primary encounter diagnosis)  Comment: Due to what sounds like accelerated angina he underwent angiography December 2019 showing left main distal 25% stenosis, proximal LAD 20% stenosis with a mid 70% lesion which was intervened upon, first diagonal 25% stenosis. Circumflex had an ostial 40% stenosis with the second obtuse marginal artery 40% stenosis, right coronary artery proximal 20%, mid 20% and distal 20%.    Still with atypical shortness of breath and will perform pharmacological stress nuclear and if medium or large sized ischemia pursue angiography.      (I10) Essential hypertension  Comment: Under good control on HCTZ and carvedilol.    (E78.00) Pure hypercholesterolemia  Comment: Has not been checked in some time, will check direct LDL today.    (I25.5) Ischemic cardiomyopathy  Comment: Ejection fraction 45% and is now on carvedilol rather than amlodipine.  Check BNP as well as repeat nuclear stress test for ejection fraction.  Consider changing HCTZ over to furosemide.    (I50.22) Chronic systolic congestive heart failure (H)  Comment: As above shortness of breath no significant signs or symptoms on exam.  Check BNP and if elevated switch HCTZ to furosemide.  Check TSH given shortness of breath as well as hemoglobin.    (E66.09) Class 2 obesity due to excess calories without serious comorbidity in adult, unspecified BMI  Comment: Work on weight loss.  Consider PFTs as well as sleep study.    Plan  1.  Given shortness of breath will check BNP, hemoglobin, TSH and address if abnormal concerns  2.  Acute on chronic rhinitis and shortness of breath follow-up.  We will arrange for repeat pharmacological stress nuclear.  3.  Weight loss.  4.  PFTs as well as sleep study.  5.  Follow-up me 1 year or sooner if needed based on above test.    Subjective  CC:  "76-year-old white gentleman here for follow-up visit.  Still short of breath with minimal activity.  Still possibly snoring at night.  General pain  Orthopnea, syncope, dizziness, chest pain chest discomfort or palpitations or peripheral edema.  Does have occasional abnormal chest discomfort.    Medications  Current Outpatient Medications   Medication Sig Dispense Refill     aspirin (ASA) 325 MG EC tablet Take 325 mg by mouth every 6 hours as needed for moderate pain       atorvastatin (LIPITOR) 80 MG tablet [ATORVASTATIN (LIPITOR) 80 MG TABLET] TAKE 1 TABLET AT BEDTIME (Patient taking differently: Take 80 mg by mouth At Bedtime [ATORVASTATIN (LIPITOR) 80 MG TABLET] TAKE 1 TABLET AT BEDTIME) 90 tablet 3     carvedilol (COREG) 3.125 MG tablet TAKE 3 TABLETS TWICE A DAY WITH MEALS 540 tablet 0     celecoxib (CELEBREX) 200 MG capsule Take 200 mg by mouth as needed       ezetimibe (ZETIA) 10 MG tablet TAKE 1 TABLET DAILY 90 tablet 1     glucosam-msm-chond-hrb149-hyal (GLUCOS-CHOND-MSM, WITH ANTIOX,) 500-500-66.7 mg Tab Take 1-2 tablets by mouth daily        hydroCHLOROthiazide (HYDRODIURIL) 25 MG tablet [HYDROCHLOROTHIAZIDE (HYDRODIURIL) 25 MG TABLET] Take 25 mg by mouth daily.        Magnesium Cl-Calcium Carbonate (SLOW-MAG PO) Take 2 tablets by mouth daily       nitroGLYcerin (NITROSTAT) 0.4 MG sublingual tablet Place 1 tablet (0.4 mg) under the tongue every 5 minutes as needed 20 tablet 4     venlafaxine (EFFEXOR) 37.5 MG tablet Take 37.5 mg by mouth 2 times daily       venlafaxine (EFFEXOR-XR) 75 MG 24 hr capsule Take 75 mg by mouth daily         Objective  /80 (BP Location: Right arm, Patient Position: Sitting, Cuff Size: Adult Large)   Pulse 64   Resp 16   Ht 1.778 m (5' 10\")   Wt 124.6 kg (274 lb 12.8 oz)   BMI 39.43 kg/m      General Appearance:    Alert, cooperative, no distress, appears stated age   Head:    Normocephalic, without obvious abnormality, atraumatic   Throat:   Lips, mucosa, and tongue " normal; teeth and gums normal   Neck:   Supple, symmetrical, trachea midline, no adenopathy;        thyroid:  No enlargement/tenderness/nodules; no carotid    bruit or JVD   Back:     Symmetric, no curvature, ROM normal, no CVA tenderness   Lungs:     Clear to auscultation bilaterally, respirations unlabored   Chest wall:    No tenderness or deformity   Heart:    Regular rate and rhythm, S1 and S2 normal, no murmur, rub   or gallop   Abdomen:     Soft, non-tender, bowel sounds active all four quadrants,     no masses, no organomegaly   Extremities:   Normal, atraumatic, no cyanosis or edema   Pulses:   2+ and symmetric all extremities   Skin:   Skin color, texture, turgor normal, no rashes or lesions     Results    Lab Results personally reviewed   Lab Results   Component Value Date    CHOL 244 (H) 07/27/2021    CHOL 164 01/29/2021     Lab Results   Component Value Date    HDL 37 (L) 07/27/2021    HDL 38 (L) 01/29/2021     No components found for: LDLCALC  Lab Results   Component Value Date    TRIG 410 (H) 07/27/2021    TRIG 241 (H) 01/29/2021     Lab Results   Component Value Date    WBC 5.6 07/27/2021    HGB 15.3 07/27/2021    HCT 47.8 07/27/2021     07/27/2021     Lab Results   Component Value Date    BUN 22 04/01/2022     04/01/2022    CO2 27 04/01/2022

## 2022-05-05 ENCOUNTER — HOSPITAL ENCOUNTER (OUTPATIENT)
Dept: RESPIRATORY THERAPY | Facility: CLINIC | Age: 77
Discharge: HOME | End: 2022-05-05
Attending: INTERNAL MEDICINE | Admitting: INTERNAL MEDICINE
Payer: COMMERCIAL

## 2022-05-05 DIAGNOSIS — R06.09 DYSPNEA ON EXERTION: ICD-10-CM

## 2022-05-05 PROCEDURE — 94060 EVALUATION OF WHEEZING: CPT | Mod: 26 | Performed by: INTERNAL MEDICINE

## 2022-05-05 PROCEDURE — 94726 PLETHYSMOGRAPHY LUNG VOLUMES: CPT | Mod: 26 | Performed by: INTERNAL MEDICINE

## 2022-05-05 PROCEDURE — 999N000157 HC STATISTIC RCP TIME EA 10 MIN

## 2022-05-05 PROCEDURE — 94726 PLETHYSMOGRAPHY LUNG VOLUMES: CPT

## 2022-05-05 PROCEDURE — 94060 EVALUATION OF WHEEZING: CPT

## 2022-05-05 PROCEDURE — 94729 DIFFUSING CAPACITY: CPT

## 2022-05-05 PROCEDURE — 94729 DIFFUSING CAPACITY: CPT | Mod: 26 | Performed by: INTERNAL MEDICINE

## 2022-05-05 PROCEDURE — 250N000009 HC RX 250: Performed by: INTERNAL MEDICINE

## 2022-05-05 RX ORDER — ALBUTEROL SULFATE 0.83 MG/ML
2.5 SOLUTION RESPIRATORY (INHALATION) ONCE
Status: COMPLETED | OUTPATIENT
Start: 2022-05-05 | End: 2022-05-05

## 2022-05-05 RX ADMIN — ALBUTEROL SULFATE 2.5 MG: 2.5 SOLUTION RESPIRATORY (INHALATION) at 12:15

## 2022-05-05 NOTE — PROGRESS NOTES
RESPIRATORY CARE NOTE     Patient Name: Cem Kevin  Today's Date: 5/5/2022     Complete PFT done. Pt performed tests with good effort. Test results meet ATS criteria. Albuterol neb given.Results scanned into epic. Pt left in no distress.       Cindy Little, RT

## 2022-05-06 LAB
DLCOCOR-%PRED-PRE: 86 %
DLCOCOR-PRE: 22.24 ML/MIN/MMHG
DLCOUNC-%PRED-PRE: 87 %
DLCOUNC-PRE: 22.55 ML/MIN/MMHG
DLCOUNC-PRED: 25.68 ML/MIN/MMHG
ERV-%PRED-PRE: 168 %
ERV-PRE: 0.91 L
ERV-PRED: 0.54 L
EXPTIME-PRE: 6.84 SEC
FEF2575-%PRED-POST: 174 %
FEF2575-%PRED-PRE: 137 %
FEF2575-POST: 4.02 L/SEC
FEF2575-PRE: 3.16 L/SEC
FEF2575-PRED: 2.3 L/SEC
FEFMAX-%PRED-PRE: 118 %
FEFMAX-PRE: 9.51 L/SEC
FEFMAX-PRED: 8.05 L/SEC
FEV1-%PRED-PRE: 101 %
FEV1-PRE: 3.18 L
FEV1FEV6-PRE: 80 %
FEV1FEV6-PRED: 77 %
FEV1FVC-PRE: 80 %
FEV1FVC-PRED: 75 %
FEV1SVC-PRE: 79 %
FEV1SVC-PRED: 66 %
FIFMAX-PRE: 3.32 L/SEC
FRCPLETH-%PRED-PRE: 88 %
FRCPLETH-PRE: 3.33 L
FRCPLETH-PRED: 3.77 L
FVC-%PRED-PRE: 95 %
FVC-PRE: 3.97 L
FVC-PRED: 4.17 L
IC-%PRED-PRE: 73 %
IC-PRE: 3.1 L
IC-PRED: 4.22 L
RVPLETH-%PRED-PRE: 88 %
RVPLETH-PRE: 2.42 L
RVPLETH-PRED: 2.74 L
TLCPLETH-%PRED-PRE: 88 %
TLCPLETH-PRE: 6.43 L
TLCPLETH-PRED: 7.23 L
VA-%PRED-PRE: 99 %
VA-PRE: 6.46 L
VC-%PRED-PRE: 84 %
VC-PRE: 4.01 L
VC-PRED: 4.76 L

## 2022-05-12 ENCOUNTER — HOSPITAL ENCOUNTER (OUTPATIENT)
Dept: NUCLEAR MEDICINE | Facility: HOSPITAL | Age: 77
Discharge: HOME | End: 2022-05-12
Attending: INTERNAL MEDICINE
Payer: COMMERCIAL

## 2022-05-12 ENCOUNTER — HOSPITAL ENCOUNTER (OUTPATIENT)
Dept: CARDIOLOGY | Facility: HOSPITAL | Age: 77
Discharge: HOME | End: 2022-05-12
Attending: INTERNAL MEDICINE
Payer: COMMERCIAL

## 2022-05-12 DIAGNOSIS — I25.83 CORONARY ARTERY DISEASE DUE TO LIPID RICH PLAQUE: ICD-10-CM

## 2022-05-12 DIAGNOSIS — I25.10 CORONARY ARTERY DISEASE DUE TO LIPID RICH PLAQUE: ICD-10-CM

## 2022-05-12 LAB
CV STRESS CURRENT BP HE: NORMAL
CV STRESS CURRENT HR HE: 58
CV STRESS CURRENT HR HE: 60
CV STRESS CURRENT HR HE: 61
CV STRESS CURRENT HR HE: 66
CV STRESS CURRENT HR HE: 74
CV STRESS CURRENT HR HE: 75
CV STRESS CURRENT HR HE: 78
CV STRESS CURRENT HR HE: 79
CV STRESS CURRENT HR HE: 80
CV STRESS CURRENT HR HE: 82
CV STRESS CURRENT HR HE: 83
CV STRESS CURRENT HR HE: 85
CV STRESS CURRENT HR HE: 88
CV STRESS CURRENT HR HE: 90
CV STRESS CURRENT HR HE: 90
CV STRESS CURRENT HR HE: 92
CV STRESS CURRENT HR HE: 92
CV STRESS CURRENT HR HE: 93
CV STRESS CURRENT HR HE: 93
CV STRESS DEVIATION TIME HE: NORMAL
CV STRESS ECHO PERCENT HR HE: NORMAL
CV STRESS EXERCISE STAGE HE: NORMAL
CV STRESS FINAL RESTING BP HE: NORMAL
CV STRESS FINAL RESTING HR HE: 75
CV STRESS MAX HR HE: 93
CV STRESS MAX TREADMILL GRADE HE: 0
CV STRESS MAX TREADMILL SPEED HE: 0
CV STRESS PEAK DIA BP HE: NORMAL
CV STRESS PEAK SYS BP HE: NORMAL
CV STRESS PHASE HE: NORMAL
CV STRESS PROTOCOL HE: NORMAL
CV STRESS RESTING PT POSITION HE: NORMAL
CV STRESS ST DEVIATION AMOUNT HE: NORMAL
CV STRESS ST DEVIATION ELEVATION HE: NORMAL
CV STRESS ST EVELATION AMOUNT HE: NORMAL
CV STRESS TEST TYPE HE: NORMAL
CV STRESS TOTAL STAGE TIME MIN 1 HE: NORMAL
NUC STRESS EJECTION FRACTION: 63 %
RATE PRESSURE PRODUCT: NORMAL
STRESS ECHO BASELINE DIASTOLIC HE: 94
STRESS ECHO BASELINE HR: 60
STRESS ECHO BASELINE SYSTOLIC BP: 141
STRESS ECHO CALCULATED PERCENT HR: 64 %
STRESS ECHO LAST STRESS DIASTOLIC BP: 71
STRESS ECHO LAST STRESS HR: 90
STRESS ECHO LAST STRESS SYSTOLIC BP: 123
STRESS ECHO TARGET HR: 146

## 2022-05-12 PROCEDURE — 93017 CV STRESS TEST TRACING ONLY: CPT | Performed by: INTERNAL MEDICINE

## 2022-05-12 PROCEDURE — 93017 CV STRESS TEST TRACING ONLY: CPT

## 2022-05-12 PROCEDURE — 93018 CV STRESS TEST I&R ONLY: CPT | Performed by: INTERNAL MEDICINE

## 2022-05-12 PROCEDURE — 250N000011 HC RX IP 250 OP 636: Performed by: INTERNAL MEDICINE

## 2022-05-12 PROCEDURE — 78452 HT MUSCLE IMAGE SPECT MULT: CPT | Mod: 26 | Performed by: INTERNAL MEDICINE

## 2022-05-12 PROCEDURE — 93016 CV STRESS TEST SUPVJ ONLY: CPT | Performed by: INTERNAL MEDICINE

## 2022-05-12 PROCEDURE — 78452 HT MUSCLE IMAGE SPECT MULT: CPT

## 2022-05-12 PROCEDURE — 343N000001 HC RX 343: Performed by: INTERNAL MEDICINE

## 2022-05-12 PROCEDURE — A9500 TC99M SESTAMIBI: HCPCS | Performed by: INTERNAL MEDICINE

## 2022-05-12 RX ORDER — REGADENOSON 0.08 MG/ML
0.4 INJECTION, SOLUTION INTRAVENOUS ONCE
Status: COMPLETED | OUTPATIENT
Start: 2022-05-12 | End: 2022-05-12

## 2022-05-12 RX ORDER — AMINOPHYLLINE 25 MG/ML
50 INJECTION, SOLUTION INTRAVENOUS
Status: DISCONTINUED | OUTPATIENT
Start: 2022-05-12 | End: 2022-05-12 | Stop reason: HOSPADM

## 2022-05-12 RX ORDER — ALBUTEROL SULFATE 0.83 MG/ML
2.5 SOLUTION RESPIRATORY (INHALATION)
Status: DISCONTINUED | OUTPATIENT
Start: 2022-05-12 | End: 2022-05-12 | Stop reason: HOSPADM

## 2022-05-12 RX ORDER — CAFFEINE CITRATE 20 MG/ML
60 SOLUTION INTRAVENOUS
Status: DISCONTINUED | OUTPATIENT
Start: 2022-05-12 | End: 2022-05-12 | Stop reason: HOSPADM

## 2022-05-12 RX ORDER — CAFFEINE 200 MG
200 TABLET ORAL
Status: DISCONTINUED | OUTPATIENT
Start: 2022-05-12 | End: 2022-05-12 | Stop reason: HOSPADM

## 2022-05-12 RX ADMIN — Medication 10.7 MCI.: at 07:23

## 2022-05-12 RX ADMIN — Medication 42.2 MILLICURIE: at 09:12

## 2022-05-12 RX ADMIN — REGADENOSON 0.4 MG: 0.08 INJECTION, SOLUTION INTRAVENOUS at 08:20

## 2022-08-11 DIAGNOSIS — E78.00 PURE HYPERCHOLESTEROLEMIA: ICD-10-CM

## 2022-08-11 DIAGNOSIS — I20.0 ANGINA PECTORIS, CRESCENDO (H): ICD-10-CM

## 2022-08-11 RX ORDER — ATORVASTATIN CALCIUM 80 MG/1
TABLET, FILM COATED ORAL
Qty: 90 TABLET | Refills: 3 | Status: SHIPPED | OUTPATIENT
Start: 2022-08-11 | End: 2023-08-24

## 2022-08-19 DIAGNOSIS — E78.00 PURE HYPERCHOLESTEROLEMIA: ICD-10-CM

## 2022-08-19 RX ORDER — EZETIMIBE 10 MG/1
TABLET ORAL
Qty: 90 TABLET | Refills: 3 | Status: SHIPPED | OUTPATIENT
Start: 2022-08-19 | End: 2023-08-29

## 2022-09-28 ENCOUNTER — TRANSFERRED RECORDS (OUTPATIENT)
Dept: HEALTH INFORMATION MANAGEMENT | Facility: CLINIC | Age: 77
End: 2022-09-28

## 2023-02-25 DIAGNOSIS — I10 ESSENTIAL HYPERTENSION: ICD-10-CM

## 2023-02-27 RX ORDER — CARVEDILOL 3.12 MG/1
TABLET ORAL
Qty: 540 TABLET | Refills: 3 | Status: SHIPPED | OUTPATIENT
Start: 2023-02-27 | End: 2024-03-08

## 2023-05-12 ENCOUNTER — OFFICE VISIT (OUTPATIENT)
Dept: CARDIOLOGY | Facility: CLINIC | Age: 78
End: 2023-05-12
Payer: COMMERCIAL

## 2023-05-12 VITALS
DIASTOLIC BLOOD PRESSURE: 80 MMHG | WEIGHT: 280 LBS | HEART RATE: 59 BPM | BODY MASS INDEX: 40.18 KG/M2 | SYSTOLIC BLOOD PRESSURE: 132 MMHG | RESPIRATION RATE: 18 BRPM

## 2023-05-12 DIAGNOSIS — E78.00 PURE HYPERCHOLESTEROLEMIA: ICD-10-CM

## 2023-05-12 DIAGNOSIS — I25.5 ISCHEMIC CARDIOMYOPATHY: ICD-10-CM

## 2023-05-12 DIAGNOSIS — I10 ESSENTIAL HYPERTENSION: ICD-10-CM

## 2023-05-12 DIAGNOSIS — I47.29 PAROXYSMAL VT (H): ICD-10-CM

## 2023-05-12 DIAGNOSIS — E66.812 CLASS 2 OBESITY DUE TO EXCESS CALORIES WITHOUT SERIOUS COMORBIDITY IN ADULT, UNSPECIFIED BMI: ICD-10-CM

## 2023-05-12 DIAGNOSIS — I25.10 CORONARY ARTERY DISEASE DUE TO LIPID RICH PLAQUE: Primary | ICD-10-CM

## 2023-05-12 DIAGNOSIS — E66.09 CLASS 2 OBESITY DUE TO EXCESS CALORIES WITHOUT SERIOUS COMORBIDITY IN ADULT, UNSPECIFIED BMI: ICD-10-CM

## 2023-05-12 DIAGNOSIS — I25.83 CORONARY ARTERY DISEASE DUE TO LIPID RICH PLAQUE: Primary | ICD-10-CM

## 2023-05-12 DIAGNOSIS — R06.09 DOE (DYSPNEA ON EXERTION): ICD-10-CM

## 2023-05-12 PROCEDURE — 99214 OFFICE O/P EST MOD 30 MIN: CPT | Performed by: INTERNAL MEDICINE

## 2023-05-12 NOTE — PROGRESS NOTES
[facility]  Heart Care Clinic Follow-up Note    Assessment & Plan        (I25.10,  I25.83) Coronary artery disease due to lipid rich plaque  (primary encounter diagnosis)  Comment: Due to what sounds like accelerated angina he underwent angiography December 2019 showing left main distal 25% stenosis, proximal LAD 20% stenosis with a mid 70% lesion which was intervened upon, first diagonal 25% stenosis. Circumflex had an ostial 40% stenosis with the second obtuse marginal artery 40% stenosis, right coronary artery proximal 20%, mid 20% and distal 20%.    Still with atypical shortness of breath and nuclear stress test looks normal last year with preserved ejection fraction.    (I10) Essential hypertension  Comment: Under good control on HCTZ and carvedilol.     (E78.00) Pure hypercholesterolemia  Comment:  Direct LDL was 84 May 2022 and he is on Zetia and maximum dose atorvastatin.     (I25.5) Ischemic cardiomyopathy  Comment: Ejection fraction 45% and is now on carvedilol rather than amlodipine.    BNP was normal and ejection fraction had normalized on his stress test.  We will recheck echo in the end of June to determine if ejection fraction still normal.    (I50.22) Chronic systolic congestive heart failure (H)  Comment: As above shortness of breath no significant signs or symptoms on exam.    BNP, TSH, hemoglobin all normal.  PFTs normal as well.     (E66.09) Class 2 obesity due to excess calories without serious comorbidity in adult, unspecified BMI  Comment: Work on weight loss.  We will arrange for sleep study.    (R06.09) SILVER (dyspnea on exertion)  Comment: As above continued shortness of breath most likely secondary to obesity with an entirely negative work-up.  We will try holding HCTZ for a week and then holding Coreg for a week to see if this is making shortness of breath any worse.    Plan  1.  Work on weight loss.  2.  Hold HCTZ for a week, if breathing gets better stop it altogether.   Breathing not better hold Coreg for a week to see if breathing gets better, if does not get better restarted.  3.  Recheck echo after holding meds around the end of June.  If ejection fraction down we will switch Coreg to a higher dose.  4.  Given his concern, he would like bigger tablets of Coreg, will consider going to once a day preparation.  5.  Arrange for sleep study.  6.  Follow-up me 1 year or sooner if needed.  7.  Consider obtaining fluoroscopy of diaphragms but doubt that he is got diaphragmatic paralysis.    Subjective  CC: 77-year-old retired  here for yearly follow-up.  Still short of breath with minimal activity but has gained 30 pounds with COVID-19.  Complains of a pleuritic left-sided chest discomfort.  No effort related angina, palpitations, PND, orthopnea, syncope, dizziness or peripheral edema.    Medications  Current Outpatient Medications   Medication Sig Dispense Refill     aspirin (ASA) 325 MG EC tablet Take 325 mg by mouth every 6 hours as needed for moderate pain       atorvastatin (LIPITOR) 80 MG tablet TAKE 1 TABLET AT BEDTIME 90 tablet 3     carvedilol (COREG) 3.125 MG tablet TAKE 3 TABLETS TWICE A DAY WITH MEALS 540 tablet 3     celecoxib (CELEBREX) 200 MG capsule Take 200 mg by mouth as needed       ezetimibe (ZETIA) 10 MG tablet TAKE 1 TABLET DAILY 90 tablet 3     glucosam-msm-chond-hrb149-hyal (GLUCOS-CHOND-MSM, WITH ANTIOX,) 500-500-66.7 mg Tab Take 1-2 tablets by mouth daily        hydroCHLOROthiazide (HYDRODIURIL) 25 MG tablet [HYDROCHLOROTHIAZIDE (HYDRODIURIL) 25 MG TABLET] Take 25 mg by mouth daily.        Magnesium Cl-Calcium Carbonate (SLOW-MAG PO) Take 2 tablets by mouth daily       nitroGLYcerin (NITROSTAT) 0.4 MG sublingual tablet Place 1 tablet (0.4 mg) under the tongue every 5 minutes as needed 20 tablet 4     venlafaxine (EFFEXOR-XR) 75 MG 24 hr capsule Take 75 mg by mouth daily         Objective  /80 (BP Location: Left arm, Patient Position: Sitting, Cuff  Size: Adult Large)   Pulse 59   Resp 18   Wt 127 kg (280 lb)   BMI 40.18 kg/m      General Appearance:    Alert, cooperative, no distress, appears stated age   Head:    Normocephalic, without obvious abnormality, atraumatic   Throat:   Lips, mucosa, and tongue normal; teeth and gums normal   Neck:   Supple, symmetrical, trachea midline, no adenopathy;        thyroid:  No enlargement/tenderness/nodules; no carotid    bruit or JVD   Back:     Symmetric, no curvature, ROM normal, no CVA tenderness   Lungs:     Clear to auscultation bilaterally, respirations unlabored   Chest wall:    No tenderness or deformity   Heart:    Regular rate and rhythm, S1 and S2 normal, no murmur, rub   or gallop   Abdomen:     Soft, non-tender, bowel sounds active all four quadrants,     no masses, no organomegaly   Extremities:   Normal, atraumatic, no cyanosis or edema   Pulses:   2+ and symmetric all extremities   Skin:   Skin color, texture, turgor normal, no rashes or lesions     Results    Lab Results personally reviewed   Lab Results   Component Value Date    CHOL 244 (H) 07/27/2021    CHOL 164 01/29/2021     Lab Results   Component Value Date    HDL 37 (L) 07/27/2021    HDL 38 (L) 01/29/2021     No components found for: LDLCALC  Lab Results   Component Value Date    TRIG 410 (H) 07/27/2021    TRIG 241 (H) 01/29/2021     Lab Results   Component Value Date    WBC 5.6 07/27/2021    HGB 15.1 05/03/2022    HCT 47.8 07/27/2021     07/27/2021     Lab Results   Component Value Date    BUN 22 04/01/2022     04/01/2022    CO2 27 04/01/2022

## 2023-05-12 NOTE — PATIENT INSTRUCTIONS
Mr Priest EBONY Kevin,  I enjoyed visiting with you again today.  I am glad to hear you are doing well.  Per our conversation let us holding the hydrochlorothiazide for a week and then restart and a week later try the COREG hold for a week and restart.  Call me at [phone] to let me know how it goes.  I will recheck the echo the end of June.  I will plan on seeing you 1 year.  Suresh Lee

## 2023-05-12 NOTE — LETTER
5/12/2023    Bryson Worthington MD  [address] [place] [address]    RE: Cem Kevin       Dear Colleague,     I had the pleasure of seeing Cem Kevin in the [facility].      [facility]  Heart Care Clinic Follow-up Note    Assessment & Plan        (I25.10,  I25.83) Coronary artery disease due to lipid rich plaque  (primary encounter diagnosis)  Comment: Due to what sounds like accelerated angina he underwent angiography December 2019 showing left main distal 25% stenosis, proximal LAD 20% stenosis with a mid 70% lesion which was intervened upon, first diagonal 25% stenosis. Circumflex had an ostial 40% stenosis with the second obtuse marginal artery 40% stenosis, right coronary artery proximal 20%, mid 20% and distal 20%.    Still with atypical shortness of breath and nuclear stress test looks normal last year with preserved ejection fraction.    (I10) Essential hypertension  Comment: Under good control on HCTZ and carvedilol.     (E78.00) Pure hypercholesterolemia  Comment:  Direct LDL was 84 May 2022 and he is on Zetia and maximum dose atorvastatin.     (I25.5) Ischemic cardiomyopathy  Comment: Ejection fraction 45% and is now on carvedilol rather than amlodipine.    BNP was normal and ejection fraction had normalized on his stress test.  We will recheck echo in the end of June to determine if ejection fraction still normal.    (I50.22) Chronic systolic congestive heart failure (H)  Comment: As above shortness of breath no significant signs or symptoms on exam.    BNP, TSH, hemoglobin all normal.  PFTs normal as well.     (E66.09) Class 2 obesity due to excess calories without serious comorbidity in adult, unspecified BMI  Comment: Work on weight loss.  We will arrange for sleep study.    (R06.09) SILVER (dyspnea on exertion)  Comment: As above continued shortness of breath most likely secondary to obesity with an entirely negative work-up.  We will try holding HCTZ for a  week and then holding Coreg for a week to see if this is making shortness of breath any worse.    Plan  1.  Work on weight loss.  2.  Hold HCTZ for a week, if breathing gets better stop it altogether.  Breathing not better hold Coreg for a week to see if breathing gets better, if does not get better restarted.  3.  Recheck echo after holding meds around the end of June.  If ejection fraction down we will switch Coreg to a higher dose.  4.  Given his concern, he would like bigger tablets of Coreg, will consider going to once a day preparation.  5.  Arrange for sleep study.  6.  Follow-up me 1 year or sooner if needed.  7.  Consider obtaining fluoroscopy of diaphragms but doubt that he is got diaphragmatic paralysis.    Subjective  CC: 77-year-old retired  here for yearly follow-up.  Still short of breath with minimal activity but has gained 30 pounds with COVID-19.  Complains of a pleuritic left-sided chest discomfort.  No effort related angina, palpitations, PND, orthopnea, syncope, dizziness or peripheral edema.    Medications  Current Outpatient Medications   Medication Sig Dispense Refill    aspirin (ASA) 325 MG EC tablet Take 325 mg by mouth every 6 hours as needed for moderate pain      atorvastatin (LIPITOR) 80 MG tablet TAKE 1 TABLET AT BEDTIME 90 tablet 3    carvedilol (COREG) 3.125 MG tablet TAKE 3 TABLETS TWICE A DAY WITH MEALS 540 tablet 3    celecoxib (CELEBREX) 200 MG capsule Take 200 mg by mouth as needed      ezetimibe (ZETIA) 10 MG tablet TAKE 1 TABLET DAILY 90 tablet 3    glucosam-msm-chond-hrb149-hyal (GLUCOS-CHOND-MSM, WITH ANTIOX,) 500-500-66.7 mg Tab Take 1-2 tablets by mouth daily       hydroCHLOROthiazide (HYDRODIURIL) 25 MG tablet [HYDROCHLOROTHIAZIDE (HYDRODIURIL) 25 MG TABLET] Take 25 mg by mouth daily.       Magnesium Cl-Calcium Carbonate (SLOW-MAG PO) Take 2 tablets by mouth daily      nitroGLYcerin (NITROSTAT) 0.4 MG sublingual tablet Place 1 tablet (0.4 mg) under the tongue  every 5 minutes as needed 20 tablet 4    venlafaxine (EFFEXOR-XR) 75 MG 24 hr capsule Take 75 mg by mouth daily         Objective  /80 (BP Location: Left arm, Patient Position: Sitting, Cuff Size: Adult Large)   Pulse 59   Resp 18   Wt 127 kg (280 lb)   BMI 40.18 kg/m      General Appearance:    Alert, cooperative, no distress, appears stated age   Head:    Normocephalic, without obvious abnormality, atraumatic   Throat:   Lips, mucosa, and tongue normal; teeth and gums normal   Neck:   Supple, symmetrical, trachea midline, no adenopathy;        thyroid:  No enlargement/tenderness/nodules; no carotid    bruit or JVD   Back:     Symmetric, no curvature, ROM normal, no CVA tenderness   Lungs:     Clear to auscultation bilaterally, respirations unlabored   Chest wall:    No tenderness or deformity   Heart:    Regular rate and rhythm, S1 and S2 normal, no murmur, rub   or gallop   Abdomen:     Soft, non-tender, bowel sounds active all four quadrants,     no masses, no organomegaly   Extremities:   Normal, atraumatic, no cyanosis or edema   Pulses:   2+ and symmetric all extremities   Skin:   Skin color, texture, turgor normal, no rashes or lesions     Results    Lab Results personally reviewed   Lab Results   Component Value Date    CHOL 244 (H) 07/27/2021    CHOL 164 01/29/2021     Lab Results   Component Value Date    HDL 37 (L) 07/27/2021    HDL 38 (L) 01/29/2021     No components found for: LDLCALC  Lab Results   Component Value Date    TRIG 410 (H) 07/27/2021    TRIG 241 (H) 01/29/2021     Lab Results   Component Value Date    WBC 5.6 07/27/2021    HGB 15.1 05/03/2022    HCT 47.8 07/27/2021     07/27/2021     Lab Results   Component Value Date    BUN 22 04/01/2022     04/01/2022    CO2 27 04/01/2022               Thank you for allowing me to participate in the care of your patient.      Sincerely,     OCTAVIO WRIGHT MD     [facility]  cc:   No referring provider defined for this encounter.

## 2023-05-16 ENCOUNTER — TELEPHONE (OUTPATIENT)
Dept: CARDIOLOGY | Facility: CLINIC | Age: 78
End: 2023-05-16
Payer: COMMERCIAL

## 2023-05-16 DIAGNOSIS — E66.812 CLASS 2 OBESITY DUE TO EXCESS CALORIES WITHOUT SERIOUS COMORBIDITY IN ADULT, UNSPECIFIED BMI: Primary | ICD-10-CM

## 2023-05-16 DIAGNOSIS — E66.09 CLASS 2 OBESITY DUE TO EXCESS CALORIES WITHOUT SERIOUS COMORBIDITY IN ADULT, UNSPECIFIED BMI: Primary | ICD-10-CM

## 2023-05-16 NOTE — TELEPHONE ENCOUNTER
----- Message from Suresh Lee MD sent at 5/12/2023 11:34 AM CDT -----  Obese patient of mine needs to have sleep study, can you help facilitate?  In addition, put it under your radar that the end of June is going to get an echo, depending on what that looks like he would like bigger size Coreg pills.  LF

## 2023-05-18 ENCOUNTER — TRANSFERRED RECORDS (OUTPATIENT)
Dept: HEALTH INFORMATION MANAGEMENT | Facility: CLINIC | Age: 78
End: 2023-05-18

## 2023-05-19 ENCOUNTER — LAB REQUISITION (OUTPATIENT)
Dept: LAB | Facility: CLINIC | Age: 78
End: 2023-05-19
Payer: COMMERCIAL

## 2023-05-19 ENCOUNTER — TRANSFERRED RECORDS (OUTPATIENT)
Dept: HEALTH INFORMATION MANAGEMENT | Facility: CLINIC | Age: 78
End: 2023-05-19

## 2023-05-19 DIAGNOSIS — I10 ESSENTIAL (PRIMARY) HYPERTENSION: ICD-10-CM

## 2023-05-19 DIAGNOSIS — N40.1 BENIGN PROSTATIC HYPERPLASIA WITH LOWER URINARY TRACT SYMPTOMS: ICD-10-CM

## 2023-05-19 DIAGNOSIS — E78.5 HYPERLIPIDEMIA, UNSPECIFIED: ICD-10-CM

## 2023-05-19 LAB
ALBUMIN SERPL BCG-MCNC: 4.3 G/DL (ref 3.5–5.2)
ALP SERPL-CCNC: 76 U/L (ref 40–129)
ALT SERPL W P-5'-P-CCNC: 43 U/L (ref 10–50)
ANION GAP SERPL CALCULATED.3IONS-SCNC: 10 MMOL/L (ref 7–15)
AST SERPL W P-5'-P-CCNC: 31 U/L (ref 10–50)
BILIRUB SERPL-MCNC: 0.6 MG/DL
BUN SERPL-MCNC: 18.6 MG/DL (ref 8–23)
CALCIUM SERPL-MCNC: 9.3 MG/DL (ref 8.8–10.2)
CHLORIDE SERPL-SCNC: 103 MMOL/L (ref 98–107)
CHOLEST SERPL-MCNC: 164 MG/DL
CREAT SERPL-MCNC: 1.18 MG/DL (ref 0.67–1.17)
DEPRECATED HCO3 PLAS-SCNC: 28 MMOL/L (ref 22–29)
GFR SERPL CREATININE-BSD FRML MDRD: 64 ML/MIN/1.73M2
GLUCOSE SERPL-MCNC: 105 MG/DL (ref 70–99)
HDLC SERPL-MCNC: 38 MG/DL
LDLC SERPL CALC-MCNC: 77 MG/DL
NONHDLC SERPL-MCNC: 126 MG/DL
POTASSIUM SERPL-SCNC: 4.7 MMOL/L (ref 3.4–5.3)
PROT SERPL-MCNC: 6.7 G/DL (ref 6.4–8.3)
PSA SERPL DL<=0.01 NG/ML-MCNC: 2.97 NG/ML (ref 0–6.5)
SODIUM SERPL-SCNC: 141 MMOL/L (ref 136–145)
TRIGL SERPL-MCNC: 247 MG/DL
TSH SERPL DL<=0.005 MIU/L-ACNC: 2.27 UIU/ML (ref 0.3–4.2)

## 2023-05-19 PROCEDURE — 80053 COMPREHEN METABOLIC PANEL: CPT | Mod: ORL | Performed by: FAMILY MEDICINE

## 2023-05-19 PROCEDURE — 80061 LIPID PANEL: CPT | Mod: ORL | Performed by: FAMILY MEDICINE

## 2023-05-19 PROCEDURE — 84443 ASSAY THYROID STIM HORMONE: CPT | Mod: ORL | Performed by: FAMILY MEDICINE

## 2023-05-19 PROCEDURE — 84153 ASSAY OF PSA TOTAL: CPT | Mod: ORL | Performed by: FAMILY MEDICINE

## 2023-06-28 ENCOUNTER — HOSPITAL ENCOUNTER (OUTPATIENT)
Dept: CARDIOLOGY | Facility: HOSPITAL | Age: 78
Discharge: HOME | End: 2023-06-28
Attending: INTERNAL MEDICINE | Admitting: INTERNAL MEDICINE
Payer: COMMERCIAL

## 2023-06-28 DIAGNOSIS — I25.5 ISCHEMIC CARDIOMYOPATHY: ICD-10-CM

## 2023-06-28 DIAGNOSIS — I25.83 CORONARY ARTERY DISEASE DUE TO LIPID RICH PLAQUE: ICD-10-CM

## 2023-06-28 DIAGNOSIS — I25.10 CORONARY ARTERY DISEASE DUE TO LIPID RICH PLAQUE: ICD-10-CM

## 2023-06-28 PROCEDURE — 93306 TTE W/DOPPLER COMPLETE: CPT | Mod: 26 | Performed by: INTERNAL MEDICINE

## 2023-06-28 PROCEDURE — 255N000002 HC RX 255 OP 636: Performed by: INTERNAL MEDICINE

## 2023-06-28 RX ADMIN — PERFLUTREN 2 ML: 6.52 INJECTION, SUSPENSION INTRAVENOUS at 11:15

## 2023-08-05 ENCOUNTER — HEALTH MAINTENANCE LETTER (OUTPATIENT)
Age: 78
End: 2023-08-05

## 2023-08-22 DIAGNOSIS — E78.00 PURE HYPERCHOLESTEROLEMIA: ICD-10-CM

## 2023-08-22 DIAGNOSIS — I20.0 ANGINA PECTORIS, CRESCENDO (H): ICD-10-CM

## 2023-08-24 RX ORDER — ATORVASTATIN CALCIUM 80 MG/1
TABLET, FILM COATED ORAL
Qty: 90 TABLET | Refills: 3 | Status: SHIPPED | OUTPATIENT
Start: 2023-08-24

## 2023-08-29 DIAGNOSIS — E78.00 PURE HYPERCHOLESTEROLEMIA: ICD-10-CM

## 2023-08-29 RX ORDER — EZETIMIBE 10 MG/1
TABLET ORAL
Qty: 90 TABLET | Refills: 3 | Status: SHIPPED | OUTPATIENT
Start: 2023-08-29 | End: 2024-08-23

## 2023-09-21 ENCOUNTER — OFFICE VISIT (OUTPATIENT)
Dept: SLEEP MEDICINE | Facility: CLINIC | Age: 78
End: 2023-09-21
Payer: COMMERCIAL

## 2023-09-21 VITALS
OXYGEN SATURATION: 97 % | WEIGHT: 288.38 LBS | HEIGHT: 70 IN | BODY MASS INDEX: 41.29 KG/M2 | HEART RATE: 63 BPM | DIASTOLIC BLOOD PRESSURE: 74 MMHG | SYSTOLIC BLOOD PRESSURE: 120 MMHG

## 2023-09-21 DIAGNOSIS — R06.81 WITNESSED EPISODE OF APNEA: Primary | ICD-10-CM

## 2023-09-21 DIAGNOSIS — E66.01 MORBID OBESITY (H): ICD-10-CM

## 2023-09-21 DIAGNOSIS — I25.5 ISCHEMIC CARDIOMYOPATHY: ICD-10-CM

## 2023-09-21 DIAGNOSIS — R06.83 SNORING: ICD-10-CM

## 2023-09-21 PROCEDURE — 99204 OFFICE O/P NEW MOD 45 MIN: CPT | Performed by: INTERNAL MEDICINE

## 2023-09-21 RX ORDER — FLUTICASONE PROPIONATE 50 MCG
2 SPRAY, SUSPENSION (ML) NASAL DAILY
COMMUNITY
Start: 2023-09-07

## 2023-09-21 NOTE — NURSING NOTE
"Chief Complaint   Patient presents with    Consult    Sleep Apnea       Initial /74   Pulse 63   Ht 1.778 m (5' 10\")   Wt 130.8 kg (288 lb 6 oz)   SpO2 97%   BMI 41.38 kg/m   Estimated body mass index is 41.38 kg/m  as calculated from the following:    Height as of this encounter: 1.778 m (5' 10\").    Weight as of this encounter: 130.8 kg (288 lb 6 oz).    Medication Reconciliation: complete    Neck circumference:  inches / 52 centimeters.    DME:     [facility]      "

## 2023-09-21 NOTE — PATIENT INSTRUCTIONS
"     What is sleep apnea?     Sleep apnea is a condition that makes you stop breathing for short periods while you are asleep. There are 2 types of sleep apnea. One is called \"obstructive sleep apnea,\" and the other is called \"central sleep apnea.\"  In obstructive sleep apnea, you stop breathing because your throat narrows or closes (figure 1). In central sleep apnea, you stop breathing because your brain does not send the right signals to your muscles to make you breathe. When people talk about sleep apnea, they are usually referring to obstructive sleep apnea, which is what this article is about.  People with sleep apnea do not know that they stop breathing when they are asleep. But they do sometimes wake up startled or gasping for breath. They also often hear from loved ones that they snore.  What are the symptoms of sleep apnea? -- The main symptoms of sleep apnea are loud snoring, tiredness, and daytime sleepiness. Other symptoms can include:  ?Restless sleep  ?Waking up choking or gasping  ?Morning headaches, dry mouth, or sore throat  ?Waking up often to urinate  ?Waking up feeling unrested or groggy  ?Trouble thinking clearly or remembering things  Some people with sleep apnea don't have symptoms, or they don't know they have them. They might figure that it's normal to be tired or to snore a lot.  Should I see a doctor or nurse? -- Yes. If you think you might have sleep apnea, see your doctor.  Is there a test for sleep apnea? -- Yes. If your doctor or nurse suspects you have sleep apnea, he or she might send you for a \"sleep study.\" Sleep studies can sometimes be done at home, but they are usually done in a sleep lab. For the study, you spend the night in the lab, and you are hooked up to different machines that monitor your heart rate, breathing, and other body functions. The results of the test will tell your doctor or nurse if you have the disorder.  Is there anything I can do on my own to help my sleep " "apnea? -- Yes. Here are some things that might help:  ?Stay off your back when sleeping. (This is not always practical, because people cannot control their position while asleep. Plus, it only helps some people.)  ?Lose weight, if you are overweight  ?Avoid alcohol, because it can make sleep apnea worse  How is sleep apnea treated? -- The most effective treatment for sleep apnea is a device that keeps your airway open while you sleep. Treatment with this device is called \"continuous positive airway pressure,\" or CPAP. People getting CPAP wear a face mask at night that keeps them breathing (figure 2).  If your doctor or nurse recommends a CPAP machine, try to be patient about using it. The mask might seem uncomfortable to wear at first, and the machine might seem noisy, but using the machine can really pay off. People with sleep apnea who use a CPAP machine feel more rested and generally feel better.  There is also another device that you wear in your mouth called an \"oral appliance\" or \"mandibular advancement device.\" It also helps keep your airway open while you sleep.  In rare cases, when nothing else helps, doctors recommend surgery to keep the airway open. Surgery to do this is not always effective, and even when it is, the problem can come back.  Is sleep apnea dangerous? -- It can be. People with sleep apnea do not get good-quality sleep, so they are often tired and not alert. This puts them at risk for car accidents and other types of accidents. Plus, studies show that people with sleep apnea are more likely than others to have high blood pressure, heart attacks, and other serious heart problems. In people with severe sleep apnea, getting treated (for example, with a CPAP machine) can help prevent some of these problems.     GRAPHICS  Airway in a person with sleep apnea    Normally when a person sleeps, the airway remains open, and air can pass from the nose and mouth to the lungs. In a person with sleep " apnea, parts of the throat and mouth drop into the airway and block off the flow of air. This can cause loud snoring and interrupt breathing for short periods.  Graphic 41450 Version 5.0      Continuous positive airway pressure (CPAP) for sleep apnea    The CPAP mask gently blows air into your nose while you sleep. It puts just enough pressure on your airway to keep it from closing. The mask in this picture fits over just the nose. Other CPAP devices have masks that fit over the nose and mouth.

## 2023-09-21 NOTE — PROGRESS NOTES
Additional 15 minutes on the date of service was spent performing the following:    -Preparing to see the patient  -Obtaining and/or reviewing separately obtained history   -Ordering medications, tests, or procedures   -Documenting clinical information in the electronic or other health record     Assessment and Plan:    In summary Cem Kevin is a 78 year old year old male here for sleep disturbance.  1.  Witnessed apnea/snoring/ischemic cardiomyopathy/morbid obesity.   Cem Kevin has high risk for obstructive sleep apnea based on the history of witnessed apnea, hypersomnia's/snoring and a crowded airway. I educated the patient on the underlying pathophysiology of obstructive sleep apnea. We reviewed the risks associated with sleep apnea, including increased cardiovascular risk and overall death. We talked about treatments briefly. I recommend getting a split-night nocturnal polysomnography with TCM. The patient declined at this time.  He would like to discuss this with his wife before making a final decision.  I welcomed the patient to follow-up with me on an as-needed basis.    History of present illness:    He is a 78 year old male who comes to the clinic with a chief complaint of witness apnea for about 4-5 years.  While the patient denies any episodes of excessive daytime sleepiness, he has been told by his wife that he has significant pauses in his breathing during sleep followed by loud snoring.  The patient also has a history of significant cardiovascular issues including coronary disease status post stent placement and ischemic cardiomyopathy.  The patient's body mass index is also elevated at 41.38.     Ideal Sleep-Wake Cycle(devoid of societal pressure):    Patient would try to initiate sleep at around 10-11 PM with a sleep latency of 10-15 minutes. The patient would have 3-4 awakening. Final wake up time is around 7AM.    LADARIUS:  LADARIUS Total Score: 17  Total score - Walker: 7 (9/21/2023  3:00  PM)    Patient Active Problem List   Diagnosis    Essential hypertension    Pure hypercholesterolemia    Class 2 obesity due to excess calories in adult    CAD (coronary artery disease)    Back pain of lumbar region with sciatica    Paroxysmal VT (H)    Depressive disorder    Ischemic cardiomyopathy    SILVER (dyspnea on exertion)       Past Medical History  Past Medical History:   Diagnosis Date    Angina pectoris (H)     per h&p    Hypertension     per H&P    Pure hypercholesterolemia     per H&P        Past Surgical History  Past Surgical History:   Procedure Laterality Date    CV CORONARY ANGIOGRAM N/A 12/6/2019    Procedure: Coronary Angiogram;  Surgeon: Andria Morrison MD;  Location: [facility];  Service: Cardiology    CV LEFT HEART CATHETERIZATION WITH LEFT VENTRICULOGRAM N/A 12/6/2019    Procedure: Left Heart Catheterization with Left Ventriculogram;  Surgeon: Andria Morrison MD;  Location: [facility];  Service: Cardiology    LAMINECTOMY LUMBAR ONE LEVEL N/A 8/5/2021    Procedure: BILATERAL LUMBAR 3-4 LATERAL RECESS DECOMPRESSION;  Surgeon: Eliazar Mace MD;  Location: [facility]  Current Outpatient Medications   Medication Sig Dispense Refill    aspirin (ASA) 325 MG EC tablet Take 325 mg by mouth every 6 hours as needed for moderate pain      atorvastatin (LIPITOR) 80 MG tablet TAKE 1 TABLET AT BEDTIME 90 tablet 3    carvedilol (COREG) 3.125 MG tablet TAKE 3 TABLETS TWICE A DAY WITH MEALS 540 tablet 3    celecoxib (CELEBREX) 200 MG capsule Take 200 mg by mouth as needed      ezetimibe (ZETIA) 10 MG tablet TAKE 1 TABLET DAILY 90 tablet 3    fluticasone (FLONASE) 50 MCG/ACT nasal spray Spray 2 sprays into both nostrils daily      glucosam-msm-chond-hrb149-hyal (GLUCOS-CHOND-MSM, WITH ANTIOX,) 500-500-66.7 mg Tab Take 1-2 tablets by mouth daily       hydroCHLOROthiazide (HYDRODIURIL) 25 MG tablet [HYDROCHLOROTHIAZIDE (HYDRODIURIL) 25 MG TABLET] Take 25 mg by mouth  daily.       Magnesium Cl-Calcium Carbonate (SLOW-MAG PO) Take 2 tablets by mouth daily      nitroGLYcerin (NITROSTAT) 0.4 MG sublingual tablet Place 1 tablet (0.4 mg) under the tongue every 5 minutes as needed 20 tablet 4    venlafaxine (EFFEXOR-XR) 75 MG 24 hr capsule Take 75 mg by mouth daily (Patient not taking: Reported on 9/21/2023)          Allergies  Vasotec [enalapril]     Social History  Social History     Socioeconomic History    Marital status:      Spouse name: Not on file    Number of children: Not on file    Years of education: Not on file    Highest education level: Not on file   Occupational History    Not on file   Tobacco Use    Smoking status: Former    Smokeless tobacco: Never    Tobacco comments:     Quit 12 years ago   Substance and Sexual Activity    Alcohol use: Yes     Alcohol/week: 1.0 standard drink of alcohol     Comment: Alcoholic Drinks/day: 2-3 glasses per month'    Drug use: Not Currently    Sexual activity: Not on file   Other Topics Concern    Not on file   Social History Narrative    Not on file     Social Determinants of Health     Financial Resource Strain: Not on file   Food Insecurity: Not on file   Transportation Needs: Not on file   Physical Activity: Not on file   Stress: Not on file   Social Connections: Not on file   Interpersonal Safety: Not on file   Housing Stability: Not on file        Family History  Family History   Problem Relation Age of Onset    Snoring Father       Review of Systems:  Constitutional: Negative except as noted in HPI.   Eyes: Negative except as noted in HPI.   ENT: Negative except as noted in HPI.   Cardiovascular: Negative except as noted in HPI.   Respiratory: Negative except as noted in HPI.   Gastrointestinal: Negative except as noted in HPI.   Genitourinary: Negative except as noted in HPI.   Musculoskeletal: Negative except as noted in HPI.   Integumentary: Negative except as noted in HPI.   Neurological: Negative except as noted in  "HPI.   Psychiatric: Negative except as noted in HPI.   Endocrine: Negative except as noted in HPI.   Hematologic/Lymphatic: Negative except as noted in HPI.      Physical Exam:  /74   Pulse 63   Ht 1.778 m (5' 10\")   Wt 130.8 kg (288 lb 6 oz)   SpO2 97%   BMI 41.38 kg/m    BMI:Body mass index is 41.38 kg/m .   GEN: NAD, morbidly obese  Head: Normocephalic.  EYES: PERRLA, EOMI  ENT: Oropharynx is clear, Gomez class 4+ airway.   Nasal mucosa is moist without erythema  Neck : Thyroid is within normal limits.   CV: Regular rate and rhythm, S1 & S2 positive.  LUNGS: Bilateral breathsounds heard.   ABDOMEN: Positive bowel sounds in all quadrants, soft, no rebound or guarding  MUSCULOSKELETAL: Bilateral 1+ leg swelling  SKIN: warm, dry, no rashes  Neurological: Alert, Gait is normal. Strength 5/5 in all extremities.  Psych: normal mood, normal affect     Labs/Studies:     No results found for: PH, PHARTERIAL, PO2, DK5PADWZIYS, SAT, PCO2, HCO3, BASEEXCESS, WIL, BEB  Lab Results   Component Value Date    TSH 2.27 05/19/2023    TSH 1.63 05/03/2022     Lab Results   Component Value Date     (H) 05/19/2023    GLC 94 04/01/2022     Lab Results   Component Value Date    HGB 15.1 05/03/2022    HGB 15.3 07/27/2021     Lab Results   Component Value Date    BUN 18.6 05/19/2023    BUN 22 04/01/2022    CR 1.18 (H) 05/19/2023    CR 1.12 04/01/2022     Lab Results   Component Value Date    AST 31 05/19/2023    AST 21 04/01/2022    ALT 43 05/19/2023    ALT 27 04/01/2022    ALKPHOS 76 05/19/2023    ALKPHOS 87 04/01/2022    BILITOTAL 0.6 05/19/2023    BILITOTAL 0.9 04/01/2022     No results found for: UAMP, UBARB, BENZODIAZEUR, UCANN, UCOC, OPIT, UPCP      Patient verbalized understanding of these issues, agrees with the plan and all questions were answered today. Patient was given an opportuntity to voice any other symptoms or concerns not listed above. Patient did not have any other symptoms or concerns.       "   Alton Gunn DO,   Board Certified in Internal Medicine and Sleep Medicine    (Note created with Dragon voice recognition and unintended spelling errors and word substitutions may occur)

## 2024-03-07 DIAGNOSIS — I10 ESSENTIAL HYPERTENSION: ICD-10-CM

## 2024-03-08 RX ORDER — CARVEDILOL 3.12 MG/1
TABLET ORAL
Qty: 540 TABLET | Refills: 0 | Status: SHIPPED | OUTPATIENT
Start: 2024-03-08 | End: 2024-04-04

## 2024-04-04 ENCOUNTER — OFFICE VISIT (OUTPATIENT)
Dept: CARDIOLOGY | Facility: CLINIC | Age: 79
End: 2024-04-04
Attending: INTERNAL MEDICINE
Payer: COMMERCIAL

## 2024-04-04 ENCOUNTER — TELEPHONE (OUTPATIENT)
Dept: CARDIOLOGY | Facility: CLINIC | Age: 79
End: 2024-04-04

## 2024-04-04 VITALS
SYSTOLIC BLOOD PRESSURE: 142 MMHG | HEART RATE: 74 BPM | OXYGEN SATURATION: 94 % | BODY MASS INDEX: 41.18 KG/M2 | DIASTOLIC BLOOD PRESSURE: 96 MMHG | WEIGHT: 287 LBS

## 2024-04-04 DIAGNOSIS — E66.812 CLASS 2 OBESITY DUE TO EXCESS CALORIES WITHOUT SERIOUS COMORBIDITY WITH BODY MASS INDEX (BMI) OF 39.0 TO 39.9 IN ADULT: ICD-10-CM

## 2024-04-04 DIAGNOSIS — R06.09 DOE (DYSPNEA ON EXERTION): ICD-10-CM

## 2024-04-04 DIAGNOSIS — E78.00 PURE HYPERCHOLESTEROLEMIA: ICD-10-CM

## 2024-04-04 DIAGNOSIS — E66.09 CLASS 2 OBESITY DUE TO EXCESS CALORIES WITHOUT SERIOUS COMORBIDITY WITH BODY MASS INDEX (BMI) OF 39.0 TO 39.9 IN ADULT: ICD-10-CM

## 2024-04-04 DIAGNOSIS — I25.83 CORONARY ARTERY DISEASE DUE TO LIPID RICH PLAQUE: Primary | ICD-10-CM

## 2024-04-04 DIAGNOSIS — I25.5 ISCHEMIC CARDIOMYOPATHY: ICD-10-CM

## 2024-04-04 DIAGNOSIS — I10 ESSENTIAL HYPERTENSION: ICD-10-CM

## 2024-04-04 DIAGNOSIS — I25.10 CORONARY ARTERY DISEASE DUE TO LIPID RICH PLAQUE: Primary | ICD-10-CM

## 2024-04-04 PROCEDURE — 99214 OFFICE O/P EST MOD 30 MIN: CPT | Performed by: INTERNAL MEDICINE

## 2024-04-04 RX ORDER — CARVEDILOL 12.5 MG/1
12.5 TABLET ORAL 2 TIMES DAILY WITH MEALS
Qty: 180 TABLET | Refills: 4 | Status: SHIPPED | OUTPATIENT
Start: 2024-04-12 | End: 2024-08-06

## 2024-04-04 RX ORDER — TAMSULOSIN HYDROCHLORIDE 0.4 MG/1
0.4 CAPSULE ORAL DAILY
COMMUNITY
Start: 2024-02-02

## 2024-04-04 NOTE — PROGRESS NOTES
[facility]  [facility] Care Clinic Follow-up Note    Assessment & Plan        (I25.10,  I25.83) Coronary artery disease due to lipid rich plaque  (primary encounter diagnosis)  Comment: Due to what sounds like accelerated angina he underwent angiography December 2019 showing left main distal 25% stenosis, proximal LAD 20% stenosis with a mid 70% lesion which was intervened upon, first diagonal 25% stenosis. Circumflex had an ostial 40% stenosis with the second obtuse marginal artery 40% stenosis, right coronary artery proximal 20%, mid 20% and distal 20%.    Still with atypical shortness of breath and nuclear stress test normal 2022 with preserved ejection fraction.  He is continuing with atypical type chest discomfort and given this we will go ahead and arrange for a exercise stress nuclear with holding carvedilol the day before and the day out.  If unable to exercise due to knee will then switch over to pharmacological.     (I10) Essential hypertension  Comment: Under good control on HCTZ and carvedilol.  He is having hard time taking all these pills and will switch the carvedilol to 12.5 twice daily and stop the HCTZ.  Did warn him of some potential fluid retention.     (E78.00) Pure hypercholesterolemia  Comment: Total cholesterol 164 with an LDL of 77 which is excellent on Zetia and maximum dose of atorvastatin.    (I25.5) Ischemic cardiomyopathy  Comment: Ejection fraction 45% and is now on carvedilol rather than amlodipine.    BNP was normal and ejection fraction had normalized on his stress test as well as his echo 50 to 55%.    (I50.22) Chronic systolic congestive heart failure (H)  Comment: As above shortness of breath no significant signs or symptoms on exam.    BNP, TSH, hemoglobin all normal.  PFTs normal as well.  Suspect shortness of breath is most likely due to obesity and sleep apnea.     (E66.09) Class 2 obesity due to excess calories without serious comorbidity in adult, unspecified  BMI  Comment: Work on weight loss.  He has met with a sleep doctor and declined sleep study, but this point time he agrees to proceed.    Plan  1.  Continue to work on weight loss.  2.  Follow-up with sleep study.  3.  Exercise stress nuclear holding carvedilol the day of and the day before.  4.  Change blood pressure pills to include carvedilol 12.5 twice daily and hold HCTZ.  5.  Follow-up in 1 year or sooner if needed.  6.  Consider adding vasodilator depending what ejection fraction is and stress test.    Subjective  CC: 78-year-old retired  here for yearly follow-up visit.  He complains of a vague left lower chest discomfort that comes on at rest and lasted less than 5 minutes.  It is more of a discomfort.  There is no effort related angina.  He complains of shortness of breath, worse after going up a flight of steps but no PND, orthopnea, peripheral edema, syncope, dizziness.    Medications  Current Outpatient Medications   Medication Sig Dispense Refill    aspirin (ASA) 325 MG EC tablet Take 325 mg by mouth every 6 hours as needed for moderate pain      atorvastatin (LIPITOR) 80 MG tablet TAKE 1 TABLET AT BEDTIME 90 tablet 3    [START ON 4/12/2024] carvedilol (COREG) 12.5 MG tablet Take 1 tablet (12.5 mg) by mouth 2 times daily (with meals) 180 tablet 4    celecoxib (CELEBREX) 200 MG capsule Take 200 mg by mouth as needed      ezetimibe (ZETIA) 10 MG tablet TAKE 1 TABLET DAILY 90 tablet 3    fluticasone (FLONASE) 50 MCG/ACT nasal spray Spray 2 sprays into both nostrils daily      hydroCHLOROthiazide (HYDRODIURIL) 25 MG tablet [HYDROCHLOROTHIAZIDE (HYDRODIURIL) 25 MG TABLET] Take 25 mg by mouth daily.       Magnesium Cl-Calcium Carbonate (SLOW-MAG PO) Take 2 tablets by mouth daily      tamsulosin (FLOMAX) 0.4 MG capsule Take 0.4 mg by mouth daily      glucosam-msm-chond-hrb149-hyal (GLUCOS-CHOND-MSM, WITH ANTIOX,) 500-500-66.7 mg Tab Take 1-2 tablets by mouth daily       nitroGLYcerin (NITROSTAT) 0.4  "MG sublingual tablet Place 1 tablet (0.4 mg) under the tongue every 5 minutes as needed 20 tablet 4       Objective  BP (!) 142/96 (BP Location: Left arm, Patient Position: Sitting, Cuff Size: Adult Large)   Pulse 74   Wt 130.2 kg (287 lb)   SpO2 94%   BMI 41.18 kg/m      General Appearance:    Alert, cooperative, no distress, appears stated age   Head:    Normocephalic, without obvious abnormality, atraumatic   Throat:   Lips, mucosa, and tongue normal; teeth and gums normal   Neck:   Supple, symmetrical, trachea midline, no adenopathy;        thyroid:  No enlargement/tenderness/nodules; no carotid    bruit or JVD   Back:     Symmetric, no curvature, ROM normal, no CVA tenderness   Lungs:     Clear to auscultation bilaterally, respirations unlabored   Chest wall:    No tenderness or deformity   Heart:    Regular rate and rhythm, S1 and S2 normal, no murmur, rub   or gallop   Abdomen:     Soft, non-tender, bowel sounds active all four quadrants,     no masses, no organomegaly   Extremities:   Normal, atraumatic, no cyanosis or edema   Pulses:   2+ and symmetric all extremities   Skin:   Skin color, texture, turgor normal, no rashes or lesions     Results    Lab Results personally reviewed   Lab Results   Component Value Date    CHOL 164 05/19/2023    CHOL 244 (H) 07/27/2021     Lab Results   Component Value Date    HDL 38 (L) 05/19/2023    HDL 37 (L) 07/27/2021     No components found for: \"LDLCALC\"  Lab Results   Component Value Date    TRIG 247 (H) 05/19/2023    TRIG 410 (H) 07/27/2021     Lab Results   Component Value Date    WBC 5.6 07/27/2021    HGB 15.1 05/03/2022    HCT 47.8 07/27/2021     07/27/2021     Lab Results   Component Value Date    BUN 18.6 05/19/2023     05/19/2023    CO2 28 05/19/2023         "

## 2024-04-04 NOTE — TELEPHONE ENCOUNTER
Noted that patient saw Dr. Gunn in September 2023. Will provide with sleep medicine number and he simply needs to call back to let them know that he is ready to proceed with sleep study. [phone].         Called Cem and he was not home yet. Msg given to his wife and # for sleep medicine was provided. She will make sure he calls for the sleep study. -[facility] [place]

## 2024-04-04 NOTE — PATIENT INSTRUCTIONS
Mr Priest EBONY eKvin,  I enjoyed visiting with you again today.  I am glad to hear you are doing well other then the shortness of breath.  As we discussed stop the hydrochlorothiazide and take 4 of the COREG 3.125 twice a day and new script will be ready in about a week.  Per our conversation let us get the stress test in [place] with no COREG the night before or the day of.  I will plan on seeing you 1 year is all good.  Suresh Lee

## 2024-04-04 NOTE — TELEPHONE ENCOUNTER
----- Message from Suresh Lee MD sent at 4/4/2024 11:33 AM CDT -----  Patient has been seen by the sleep study team, he declined sleep study.  He is now willing to proceed, can you please follow-up with him and make sure he gets a sleep study done?  LF

## 2024-04-04 NOTE — LETTER
4/4/2024    Bryson Worthington MD  [facility] [address] [address]    RE: Cem Kevin       Dear Colleague,     I had the pleasure of seeing Cem Kevin in the [facility].      [facility]  Heart Care Clinic Follow-up Note    Assessment & Plan        (I25.10,  I25.83) Coronary artery disease due to lipid rich plaque  (primary encounter diagnosis)  Comment: Due to what sounds like accelerated angina he underwent angiography December 2019 showing left main distal 25% stenosis, proximal LAD 20% stenosis with a mid 70% lesion which was intervened upon, first diagonal 25% stenosis. Circumflex had an ostial 40% stenosis with the second obtuse marginal artery 40% stenosis, right coronary artery proximal 20%, mid 20% and distal 20%.    Still with atypical shortness of breath and nuclear stress test normal 2022 with preserved ejection fraction.  He is continuing with atypical type chest discomfort and given this we will go ahead and arrange for a exercise stress nuclear with holding carvedilol the day before and the day out.  If unable to exercise due to knee will then switch over to pharmacological.     (I10) Essential hypertension  Comment: Under good control on HCTZ and carvedilol.  He is having hard time taking all these pills and will switch the carvedilol to 12.5 twice daily and stop the HCTZ.  Did warn him of some potential fluid retention.     (E78.00) Pure hypercholesterolemia  Comment: Total cholesterol 164 with an LDL of 77 which is excellent on Zetia and maximum dose of atorvastatin.    (I25.5) Ischemic cardiomyopathy  Comment: Ejection fraction 45% and is now on carvedilol rather than amlodipine.    BNP was normal and ejection fraction had normalized on his stress test as well as his echo 50 to 55%.    (I50.22) Chronic systolic congestive heart failure (H)  Comment: As above shortness of breath no significant signs or symptoms on exam.    BNP, TSH,  hemoglobin all normal.  PFTs normal as well.  Suspect shortness of breath is most likely due to obesity and sleep apnea.     (E66.09) Class 2 obesity due to excess calories without serious comorbidity in adult, unspecified BMI  Comment: Work on weight loss.  He has met with a sleep doctor and declined sleep study, but this point time he agrees to proceed.    Plan  1.  Continue to work on weight loss.  2.  Follow-up with sleep study.  3.  Exercise stress nuclear holding carvedilol the day of and the day before.  4.  Change blood pressure pills to include carvedilol 12.5 twice daily and hold HCTZ.  5.  Follow-up in 1 year or sooner if needed.  6.  Consider adding vasodilator depending what ejection fraction is and stress test.    Subjective  CC: 78-year-old retired  here for yearly follow-up visit.  He complains of a vague left lower chest discomfort that comes on at rest and lasted less than 5 minutes.  It is more of a discomfort.  There is no effort related angina.  He complains of shortness of breath, worse after going up a flight of steps but no PND, orthopnea, peripheral edema, syncope, dizziness.    Medications  Current Outpatient Medications   Medication Sig Dispense Refill    aspirin (ASA) 325 MG EC tablet Take 325 mg by mouth every 6 hours as needed for moderate pain      atorvastatin (LIPITOR) 80 MG tablet TAKE 1 TABLET AT BEDTIME 90 tablet 3    [START ON 4/12/2024] carvedilol (COREG) 12.5 MG tablet Take 1 tablet (12.5 mg) by mouth 2 times daily (with meals) 180 tablet 4    celecoxib (CELEBREX) 200 MG capsule Take 200 mg by mouth as needed      ezetimibe (ZETIA) 10 MG tablet TAKE 1 TABLET DAILY 90 tablet 3    fluticasone (FLONASE) 50 MCG/ACT nasal spray Spray 2 sprays into both nostrils daily      hydroCHLOROthiazide (HYDRODIURIL) 25 MG tablet [HYDROCHLOROTHIAZIDE (HYDRODIURIL) 25 MG TABLET] Take 25 mg by mouth daily.       Magnesium Cl-Calcium Carbonate (SLOW-MAG PO) Take 2 tablets by mouth daily    "   tamsulosin (FLOMAX) 0.4 MG capsule Take 0.4 mg by mouth daily      glucosam-msm-chond-hrb149-hyal (GLUCOS-CHOND-MSM, WITH ANTIOX,) 500-500-66.7 mg Tab Take 1-2 tablets by mouth daily       nitroGLYcerin (NITROSTAT) 0.4 MG sublingual tablet Place 1 tablet (0.4 mg) under the tongue every 5 minutes as needed 20 tablet 4       Objective  BP (!) 142/96 (BP Location: Left arm, Patient Position: Sitting, Cuff Size: Adult Large)   Pulse 74   Wt 130.2 kg (287 lb)   SpO2 94%   BMI 41.18 kg/m      General Appearance:    Alert, cooperative, no distress, appears stated age   Head:    Normocephalic, without obvious abnormality, atraumatic   Throat:   Lips, mucosa, and tongue normal; teeth and gums normal   Neck:   Supple, symmetrical, trachea midline, no adenopathy;        thyroid:  No enlargement/tenderness/nodules; no carotid    bruit or JVD   Back:     Symmetric, no curvature, ROM normal, no CVA tenderness   Lungs:     Clear to auscultation bilaterally, respirations unlabored   Chest wall:    No tenderness or deformity   Heart:    Regular rate and rhythm, S1 and S2 normal, no murmur, rub   or gallop   Abdomen:     Soft, non-tender, bowel sounds active all four quadrants,     no masses, no organomegaly   Extremities:   Normal, atraumatic, no cyanosis or edema   Pulses:   2+ and symmetric all extremities   Skin:   Skin color, texture, turgor normal, no rashes or lesions     Results    Lab Results personally reviewed   Lab Results   Component Value Date    CHOL 164 05/19/2023    CHOL 244 (H) 07/27/2021     Lab Results   Component Value Date    HDL 38 (L) 05/19/2023    HDL 37 (L) 07/27/2021     No components found for: \"LDLCALC\"  Lab Results   Component Value Date    TRIG 247 (H) 05/19/2023    TRIG 410 (H) 07/27/2021     Lab Results   Component Value Date    WBC 5.6 07/27/2021    HGB 15.1 05/03/2022    HCT 47.8 07/27/2021     07/27/2021     Lab Results   Component Value Date    BUN 18.6 05/19/2023     " 05/19/2023    CO2 28 05/19/2023             Thank you for allowing me to participate in the care of your patient.      Sincerely,     OCTAVIO LEE MD     [facility]  cc:   Octavio Lee MD  [address], [address]  [place] [address]

## 2024-04-05 ENCOUNTER — TELEPHONE (OUTPATIENT)
Dept: SLEEP MEDICINE | Facility: CLINIC | Age: 79
End: 2024-04-05
Payer: COMMERCIAL

## 2024-04-05 DIAGNOSIS — E66.01 MORBID OBESITY (H): ICD-10-CM

## 2024-04-05 DIAGNOSIS — R06.81 WITNESSED EPISODE OF APNEA: Primary | ICD-10-CM

## 2024-04-05 DIAGNOSIS — I25.5 ISCHEMIC CARDIOMYOPATHY: ICD-10-CM

## 2024-04-05 DIAGNOSIS — R06.83 SNORING: ICD-10-CM

## 2024-04-05 NOTE — TELEPHONE ENCOUNTER
Order/Referral Request    Who is requesting: patient    Orders being requested: sleep study    Reason service is needed/diagnosis: patient have orders for a sleep study that got canceled. They need to be reinstated so he can be scheduled.    When are orders needed by: ASAP    Has this been discussed with Provider: Yes    Does patient have a preference on a Group/Provider/Facility? [facility]    Does patient have an appointment scheduled?: No    Where to send orders: Place orders within Epic    Could we send this information to you in Travee or would you prefer to receive a phone call?:   Patient would like to be contacted via Travee

## 2024-04-08 NOTE — TELEPHONE ENCOUNTER
Unable to reinstate orders. Routing to provider for new study orders.     Lorena PARKINSON RN  [facility]

## 2024-04-11 ENCOUNTER — HOSPITAL ENCOUNTER (OUTPATIENT)
Dept: NUCLEAR MEDICINE | Facility: HOSPITAL | Age: 79
Discharge: HOME | End: 2024-04-11
Attending: INTERNAL MEDICINE
Payer: COMMERCIAL

## 2024-04-11 ENCOUNTER — HOSPITAL ENCOUNTER (OUTPATIENT)
Dept: CARDIOLOGY | Facility: HOSPITAL | Age: 79
Discharge: HOME | End: 2024-04-11
Attending: INTERNAL MEDICINE
Payer: COMMERCIAL

## 2024-04-11 ENCOUNTER — TELEPHONE (OUTPATIENT)
Dept: SLEEP MEDICINE | Facility: CLINIC | Age: 79
End: 2024-04-11

## 2024-04-11 DIAGNOSIS — R06.09 DOE (DYSPNEA ON EXERTION): ICD-10-CM

## 2024-04-11 DIAGNOSIS — I25.83 CORONARY ARTERY DISEASE DUE TO LIPID RICH PLAQUE: ICD-10-CM

## 2024-04-11 DIAGNOSIS — I25.10 CORONARY ARTERY DISEASE DUE TO LIPID RICH PLAQUE: ICD-10-CM

## 2024-04-11 DIAGNOSIS — I25.5 ISCHEMIC CARDIOMYOPATHY: ICD-10-CM

## 2024-04-11 LAB
CV STRESS CURRENT BP HE: NORMAL
CV STRESS CURRENT HR HE: 62
CV STRESS CURRENT HR HE: 66
CV STRESS CURRENT HR HE: 73
CV STRESS CURRENT HR HE: 78
CV STRESS CURRENT HR HE: 82
CV STRESS CURRENT HR HE: 83
CV STRESS CURRENT HR HE: 84
CV STRESS CURRENT HR HE: 85
CV STRESS CURRENT HR HE: 87
CV STRESS CURRENT HR HE: 88
CV STRESS CURRENT HR HE: 90
CV STRESS CURRENT HR HE: 92
CV STRESS DEVIATION TIME HE: NORMAL
CV STRESS ECHO PERCENT HR HE: NORMAL
CV STRESS EXERCISE STAGE HE: NORMAL
CV STRESS FINAL RESTING BP HE: NORMAL
CV STRESS FINAL RESTING HR HE: 83
CV STRESS MAX HR HE: 93
CV STRESS MAX TREADMILL GRADE HE: 0
CV STRESS MAX TREADMILL SPEED HE: 0
CV STRESS PEAK DIA BP HE: NORMAL
CV STRESS PEAK SYS BP HE: NORMAL
CV STRESS PHASE HE: NORMAL
CV STRESS PROTOCOL HE: NORMAL
CV STRESS RESTING PT POSITION HE: NORMAL
CV STRESS ST DEVIATION AMOUNT HE: NORMAL
CV STRESS ST DEVIATION ELEVATION HE: NORMAL
CV STRESS ST EVELATION AMOUNT HE: NORMAL
CV STRESS TEST TYPE HE: NORMAL
CV STRESS TOTAL STAGE TIME MIN 1 HE: NORMAL
NUC STRESS EJECTION FRACTION: 61 %
RATE PRESSURE PRODUCT: NORMAL
STRESS ECHO BASELINE DIASTOLIC HE: 99
STRESS ECHO BASELINE HR: 68
STRESS ECHO BASELINE SYSTOLIC BP: 188
STRESS ECHO CALCULATED PERCENT HR: 65 %
STRESS ECHO LAST STRESS DIASTOLIC BP: 97
STRESS ECHO LAST STRESS HR: 84
STRESS ECHO LAST STRESS SYSTOLIC BP: 153
STRESS ECHO TARGET HR: 144

## 2024-04-11 PROCEDURE — 93016 CV STRESS TEST SUPVJ ONLY: CPT | Performed by: INTERNAL MEDICINE

## 2024-04-11 PROCEDURE — 343N000001 HC RX 343: Performed by: INTERNAL MEDICINE

## 2024-04-11 PROCEDURE — 78452 HT MUSCLE IMAGE SPECT MULT: CPT

## 2024-04-11 PROCEDURE — 93017 CV STRESS TEST TRACING ONLY: CPT

## 2024-04-11 PROCEDURE — 78452 HT MUSCLE IMAGE SPECT MULT: CPT | Mod: 26 | Performed by: INTERNAL MEDICINE

## 2024-04-11 PROCEDURE — 93018 CV STRESS TEST I&R ONLY: CPT | Performed by: INTERNAL MEDICINE

## 2024-04-11 PROCEDURE — A9500 TC99M SESTAMIBI: HCPCS | Performed by: INTERNAL MEDICINE

## 2024-04-11 PROCEDURE — 250N000011 HC RX IP 250 OP 636: Performed by: INTERNAL MEDICINE

## 2024-04-11 RX ORDER — AMINOPHYLLINE 25 MG/ML
50 INJECTION, SOLUTION INTRAVENOUS
Status: DISCONTINUED | OUTPATIENT
Start: 2024-04-11 | End: 2024-04-11 | Stop reason: HOSPADM

## 2024-04-11 RX ORDER — REGADENOSON 0.08 MG/ML
0.4 INJECTION, SOLUTION INTRAVENOUS ONCE
Status: COMPLETED | OUTPATIENT
Start: 2024-04-11 | End: 2024-04-11

## 2024-04-11 RX ADMIN — Medication 42.4 MILLICURIE: at 11:10

## 2024-04-11 RX ADMIN — Medication 10.75 MILLICURIE: at 09:12

## 2024-04-11 RX ADMIN — REGADENOSON 0.4 MG: 0.08 INJECTION, SOLUTION INTRAVENOUS at 10:04

## 2024-04-11 NOTE — TELEPHONE ENCOUNTER
Patient is scheduled for comprehensive sleep study test in [place] on 10/06/24, he doesn't use his [employer] account at all and is wondering if we can send a letter to him of when and where he should go for the test and what to except that night.    Tenisha Deutsch   [facility]  Central Scheduler

## 2024-07-18 ENCOUNTER — LAB REQUISITION (OUTPATIENT)
Dept: LAB | Facility: CLINIC | Age: 79
End: 2024-07-18
Payer: COMMERCIAL

## 2024-07-18 DIAGNOSIS — I10 ESSENTIAL (PRIMARY) HYPERTENSION: ICD-10-CM

## 2024-07-18 DIAGNOSIS — N40.1 BENIGN PROSTATIC HYPERPLASIA WITH LOWER URINARY TRACT SYMPTOMS: ICD-10-CM

## 2024-07-18 DIAGNOSIS — E78.5 HYPERLIPIDEMIA, UNSPECIFIED: ICD-10-CM

## 2024-07-18 LAB
ALBUMIN SERPL BCG-MCNC: 4.1 G/DL (ref 3.5–5.2)
ALP SERPL-CCNC: 65 U/L (ref 40–150)
ALT SERPL W P-5'-P-CCNC: 24 U/L (ref 0–70)
ANION GAP SERPL CALCULATED.3IONS-SCNC: 10 MMOL/L (ref 7–15)
AST SERPL W P-5'-P-CCNC: 22 U/L (ref 0–45)
BILIRUB SERPL-MCNC: 0.6 MG/DL
BUN SERPL-MCNC: 21.5 MG/DL (ref 8–23)
CALCIUM SERPL-MCNC: 8.7 MG/DL (ref 8.8–10.4)
CHLORIDE SERPL-SCNC: 106 MMOL/L (ref 98–107)
CHOLEST SERPL-MCNC: 167 MG/DL
CREAT SERPL-MCNC: 1.17 MG/DL (ref 0.67–1.17)
EGFRCR SERPLBLD CKD-EPI 2021: 64 ML/MIN/1.73M2
FASTING STATUS PATIENT QL REPORTED: ABNORMAL
FASTING STATUS PATIENT QL REPORTED: NORMAL
GLUCOSE SERPL-MCNC: 98 MG/DL (ref 70–99)
HCO3 SERPL-SCNC: 26 MMOL/L (ref 22–29)
HDLC SERPL-MCNC: 42 MG/DL
LDLC SERPL CALC-MCNC: 97 MG/DL
NONHDLC SERPL-MCNC: 125 MG/DL
POTASSIUM SERPL-SCNC: 4.1 MMOL/L (ref 3.4–5.3)
PROT SERPL-MCNC: 6.6 G/DL (ref 6.4–8.3)
PSA SERPL DL<=0.01 NG/ML-MCNC: 2.13 NG/ML (ref 0–6.5)
SODIUM SERPL-SCNC: 142 MMOL/L (ref 135–145)
TRIGL SERPL-MCNC: 139 MG/DL

## 2024-07-18 PROCEDURE — 80061 LIPID PANEL: CPT | Mod: ORL | Performed by: FAMILY MEDICINE

## 2024-07-18 PROCEDURE — 80053 COMPREHEN METABOLIC PANEL: CPT | Mod: ORL | Performed by: FAMILY MEDICINE

## 2024-07-18 PROCEDURE — 84153 ASSAY OF PSA TOTAL: CPT | Mod: ORL | Performed by: FAMILY MEDICINE

## 2024-08-06 ENCOUNTER — TELEPHONE (OUTPATIENT)
Dept: CARDIOLOGY | Facility: CLINIC | Age: 79
End: 2024-08-06
Payer: COMMERCIAL

## 2024-08-06 DIAGNOSIS — I10 ESSENTIAL HYPERTENSION: ICD-10-CM

## 2024-08-06 RX ORDER — CARVEDILOL 12.5 MG/1
12.5 TABLET ORAL 2 TIMES DAILY WITH MEALS
Qty: 180 TABLET | Refills: 3 | Status: SHIPPED | OUTPATIENT
Start: 2024-08-06

## 2024-08-06 NOTE — TELEPHONE ENCOUNTER
[facility] Call Center    Phone Message    May a detailed message be left on voicemail: yes     Reason for Call: Medication Refill Request    Has the patient contacted the pharmacy for the refill? Yes   Name of medication being requested:   carvedilol (COREG) 12.5 MG tablet       Provider who prescribed the medication: leida  Pharmacy:    [facility] - [place], [place] - [address] [place] [address]  asap       Action Taken: Other: cardiology    Travel Screening: Not Applicable     Date of Service:

## 2024-08-23 DIAGNOSIS — E78.00 PURE HYPERCHOLESTEROLEMIA: ICD-10-CM

## 2024-08-23 RX ORDER — EZETIMIBE 10 MG/1
TABLET ORAL
Qty: 90 TABLET | Refills: 1 | Status: SHIPPED | OUTPATIENT
Start: 2024-08-23

## 2024-09-28 ENCOUNTER — HEALTH MAINTENANCE LETTER (OUTPATIENT)
Age: 79
End: 2024-09-28

## 2024-10-14 DIAGNOSIS — I20.0 ANGINA PECTORIS, CRESCENDO (H): ICD-10-CM

## 2024-10-14 DIAGNOSIS — E78.00 PURE HYPERCHOLESTEROLEMIA: ICD-10-CM

## 2024-10-15 RX ORDER — ATORVASTATIN CALCIUM 80 MG/1
TABLET, FILM COATED ORAL
Qty: 90 TABLET | Refills: 0 | Status: SHIPPED | OUTPATIENT
Start: 2024-10-15

## 2025-01-09 DIAGNOSIS — E78.00 PURE HYPERCHOLESTEROLEMIA: ICD-10-CM

## 2025-01-09 DIAGNOSIS — I20.0 ANGINA PECTORIS, CRESCENDO (H): ICD-10-CM

## 2025-01-13 RX ORDER — ATORVASTATIN CALCIUM 80 MG/1
TABLET, FILM COATED ORAL
Qty: 90 TABLET | Refills: 0 | Status: SHIPPED | OUTPATIENT
Start: 2025-01-13

## 2025-02-18 DIAGNOSIS — E78.00 PURE HYPERCHOLESTEROLEMIA: ICD-10-CM

## 2025-02-19 RX ORDER — EZETIMIBE 10 MG/1
TABLET ORAL
Qty: 90 TABLET | Refills: 0 | Status: SHIPPED | OUTPATIENT
Start: 2025-02-19

## 2025-04-05 ENCOUNTER — HEALTH MAINTENANCE LETTER (OUTPATIENT)
Age: 80
End: 2025-04-05

## 2025-04-10 ENCOUNTER — OFFICE VISIT (OUTPATIENT)
Dept: CARDIOLOGY | Facility: CLINIC | Age: 80
End: 2025-04-10
Payer: COMMERCIAL

## 2025-04-10 VITALS
SYSTOLIC BLOOD PRESSURE: 135 MMHG | OXYGEN SATURATION: 96 % | DIASTOLIC BLOOD PRESSURE: 70 MMHG | RESPIRATION RATE: 20 BRPM | HEART RATE: 78 BPM | WEIGHT: 286 LBS | BODY MASS INDEX: 41.04 KG/M2

## 2025-04-10 DIAGNOSIS — E66.09 CLASS 2 OBESITY DUE TO EXCESS CALORIES WITHOUT SERIOUS COMORBIDITY WITH BODY MASS INDEX (BMI) OF 39.0 TO 39.9 IN ADULT: ICD-10-CM

## 2025-04-10 DIAGNOSIS — I25.83 CORONARY ARTERIOSCLEROSIS DUE TO LIPID RICH PLAQUE: Primary | ICD-10-CM

## 2025-04-10 DIAGNOSIS — I35.8 AORTIC VALVE SCLEROSIS: ICD-10-CM

## 2025-04-10 DIAGNOSIS — E66.812 CLASS 2 OBESITY DUE TO EXCESS CALORIES WITHOUT SERIOUS COMORBIDITY WITH BODY MASS INDEX (BMI) OF 39.0 TO 39.9 IN ADULT: ICD-10-CM

## 2025-04-10 DIAGNOSIS — I10 ESSENTIAL HYPERTENSION: ICD-10-CM

## 2025-04-10 DIAGNOSIS — G47.33 OSA (OBSTRUCTIVE SLEEP APNEA): ICD-10-CM

## 2025-04-10 DIAGNOSIS — I25.5 ISCHEMIC CARDIOMYOPATHY: ICD-10-CM

## 2025-04-10 DIAGNOSIS — E78.00 PURE HYPERCHOLESTEROLEMIA: ICD-10-CM

## 2025-04-10 PROBLEM — I25.10 CAD (CORONARY ARTERY DISEASE): Status: RESOLVED | Noted: 2019-12-19 | Resolved: 2025-04-10

## 2025-04-10 RX ORDER — ATORVASTATIN CALCIUM 80 MG/1
TABLET, FILM COATED ORAL
Qty: 90 TABLET | Refills: 4 | Status: SHIPPED | OUTPATIENT
Start: 2025-04-10

## 2025-04-10 NOTE — PATIENT INSTRUCTIONS
Mr Priest EBONY Kevin,  I enjoyed visiting with you again today.  I am glad to hear you are doing well.  Per our conversation let us get the echo of the heart valve to see if stiff.  Please go back on ATORVASTATIN as well as baby ASPIRIN every day.  I will plan on seeing you 1 year or sooner if needed.  Also, try to check some blood pressures and if running higher top number then 140 let me know.  Suresh Lee

## 2025-04-10 NOTE — PROGRESS NOTES
[facility]  Heart Care Clinic Follow-up Note    Assessment & Plan        (I25.83) Coronary arteriosclerosis due to lipid rich plaque  (primary encounter diagnosis)Comment: Due to what sounds like accelerated angina he underwent angiography December 2019 showing left main distal 25% stenosis, proximal LAD 20% stenosis with a mid 70% lesion which was intervened upon, first diagonal 25% stenosis. Circumflex had an ostial 40% stenosis with the second obtuse marginal artery 40% stenosis, right coronary artery proximal 20%, mid 20% and distal 20%.   Continues with this atypical chest discomfort, this prompted normal stress test in 2022 as well as 2024.  If chest discomfort should worsen or come on with activity would then consider repeat stress testing.      (I25.5) Ischemic cardiomyopathy  Comment: Ejection fraction 45% and is now on carvedilol rather than amlodipine.    BNP was normal and ejection fraction had normalized on his stress test as well as his echo 50 to 55%.  No significant heart failure symptoms but will recheck echo now given aortic valve.     (I50.22) Chronic systolic congestive heart failure (H)  Comment: As above shortness of breath no significant signs or symptoms of heart failure on exam.    BNP, TSH, hemoglobin all normal.  PFTs normal as well.  Suspect shortness of breath is most likely due to obesity and sleep apnea.    (I35.8) Aortic valve sclerosis  Comment: No stenosis in the past, murmur sounds a little louder, will check echo looking for aortic stenosis.    (I10) Essential hypertension  Comment: Under good control on carvedilol.  Will continue to monitor, I have asked him to check blood pressures at home, if need be add vasodilator or increased dose of carvedilol.      (E78.00) Pure hypercholesterolemia  Comment: Total cholesterol 167 with LDL of 97 unacceptable.  He stopped atorvastatin on his own.  Will go ahead and have her restart atorvastatin and get fasting lipids and LFTs in  several months.     (E66.09) Class 2 obesity due to excess calories without serious comorbidity in adult, unspecified BMI  Comment: Work on weight loss.     (G47.33) YVONNE (obstructive sleep apnea)  Comment: Has met sleep doctor, has sleep evaluation, apparently only mild and no CPAP.    Plan  1.  Restart baby aspirin today.  2.  Restart atorvastatin.  3.  Arrange for echo.  4.  Work on weight loss.  5.  Aerobic activity.  6.  Will arrange for fasting lipids and LFTs in about 3 months.  7.  Follow-up in 1 year or sooner if needed.    The longitudinal plan of care for the diagnosis(es)/condition(s) as documented were addressed during this visit. Due to the added complexity in care, I will continue to support Cem in the subsequent management and with ongoing continuity of care.     Subjective  CC: 79-year-old white gentleman being seen in yearly follow-up.  Since I seen him he has discontinued his aspirin and discontinue his atorvastatin.  He comes in tell me some shortness of breath and heavy activity.  Tells me at rest, he will get stabbing type sensation maybe twice a month, will be under his left breast and come on and last for about 5 minutes.  No effort related angina, chest pains, effort related shortness of breath, PND, orthopnea, syncope, dizziness or significant peripheral edema.    Medications  Current Outpatient Medications   Medication Sig Dispense Refill    aspirin (ASA) 325 MG EC tablet Take 325 mg by mouth every 6 hours as needed for moderate pain      atorvastatin (LIPITOR) 80 MG tablet TAKE 1 TABLET AT BEDTIME, IF GETTING MUSCLE CRAMPS LET US KNOW 90 tablet 4    carvedilol (COREG) 12.5 MG tablet Take 1 tablet (12.5 mg) by mouth 2 times daily (with meals) 180 tablet 3    ezetimibe (ZETIA) 10 MG tablet TAKE 1 TABLET DAILY 90 tablet 0    fluticasone (FLONASE) 50 MCG/ACT nasal spray Spray 2 sprays into both nostrils daily as needed.      Multiple Vitamin (MULTI VITAMIN) TABS 1 tab(s) orally once a day   "    nitroGLYcerin (NITROSTAT) 0.4 MG sublingual tablet Place 1 tablet (0.4 mg) under the tongue every 5 minutes as needed 20 tablet 4    tamsulosin (FLOMAX) 0.4 MG capsule Take 0.4 mg by mouth daily      celecoxib (CELEBREX) 200 MG capsule Take 200 mg by mouth as needed (Patient not taking: Reported on 4/10/2025)      glucosam-msm-chond-hrb149-hyal (GLUCOS-CHOND-MSM, WITH ANTIOX,) 500-500-66.7 mg Tab Take 1-2 tablets by mouth daily  (Patient not taking: Reported on 4/10/2025)         Objective  /70 (BP Location: Left arm, Patient Position: Sitting, Cuff Size: Adult Large)   Pulse 78   Resp 20   Wt 129.7 kg (286 lb)   SpO2 96%   BMI 41.04 kg/m      General Appearance:    Alert, cooperative, no distress, appears stated age   Head:    Normocephalic, without obvious abnormality, atraumatic   Throat:   Lips, mucosa, and tongue normal; teeth and gums normal   Neck:   Supple, symmetrical, trachea midline, no adenopathy;        thyroid:  No enlargement/tenderness/nodules; no carotid    bruit or JVD   Back:     Symmetric, no curvature, ROM normal, no CVA tenderness   Lungs:     Clear to auscultation bilaterally, respirations unlabored   Chest wall:    No tenderness or deformity   Heart:    Regular rate and rhythm, S1 and S2 normal, 1/6 systolic ejection murmur, no rub   or gallop   Abdomen:     Soft, non-tender, bowel sounds active all four quadrants,     no masses, no organomegaly   Extremities:   Normal, atraumatic, no cyanosis or edema   Pulses:   2+ and symmetric all extremities   Skin:   Skin color, texture, turgor normal, no rashes or lesions     Results    Lab Results personally reviewed   Lab Results   Component Value Date    CHOL 167 07/18/2024    CHOL 164 05/19/2023     Lab Results   Component Value Date    HDL 42 07/18/2024    HDL 38 (L) 05/19/2023     No components found for: \"LDLCALC\"  Lab Results   Component Value Date    TRIG 139 07/18/2024    TRIG 247 (H) 05/19/2023     Lab Results   Component " Value Date    WBC 5.6 07/27/2021    HGB 15.1 05/03/2022    HCT 47.8 07/27/2021     07/27/2021     Lab Results   Component Value Date    BUN 21.5 07/18/2024     07/18/2024    CO2 26 07/18/2024

## 2025-04-10 NOTE — LETTER
4/10/2025    Bryson Worthington MD  [address] [place] [address]    RE: Cem Kevin       Dear Colleague,     I had the pleasure of seeing Cem Kevin in the [facility].      [facility]  Heart Care Clinic Follow-up Note    Assessment & Plan        (I25.83) Coronary arteriosclerosis due to lipid rich plaque  (primary encounter diagnosis)Comment: Due to what sounds like accelerated angina he underwent angiography December 2019 showing left main distal 25% stenosis, proximal LAD 20% stenosis with a mid 70% lesion which was intervened upon, first diagonal 25% stenosis. Circumflex had an ostial 40% stenosis with the second obtuse marginal artery 40% stenosis, right coronary artery proximal 20%, mid 20% and distal 20%.   Continues with this atypical chest discomfort, this prompted normal stress test in 2022 as well as 2024.  If chest discomfort should worsen or come on with activity would then consider repeat stress testing.      (I25.5) Ischemic cardiomyopathy  Comment: Ejection fraction 45% and is now on carvedilol rather than amlodipine.    BNP was normal and ejection fraction had normalized on his stress test as well as his echo 50 to 55%.  No significant heart failure symptoms but will recheck echo now given aortic valve.     (I50.22) Chronic systolic congestive heart failure (H)  Comment: As above shortness of breath no significant signs or symptoms of heart failure on exam.    BNP, TSH, hemoglobin all normal.  PFTs normal as well.  Suspect shortness of breath is most likely due to obesity and sleep apnea.    (I35.8) Aortic valve sclerosis  Comment: No stenosis in the past, murmur sounds a little louder, will check echo looking for aortic stenosis.    (I10) Essential hypertension  Comment: Under good control on carvedilol.  Will continue to monitor, I have asked him to check blood pressures at home, if need be add vasodilator or increased dose of carvedilol.      (E78.00)  Pure hypercholesterolemia  Comment: Total cholesterol 167 with LDL of 97 unacceptable.  He stopped atorvastatin on his own.  Will go ahead and have her restart atorvastatin and get fasting lipids and LFTs in several months.     (E66.09) Class 2 obesity due to excess calories without serious comorbidity in adult, unspecified BMI  Comment: Work on weight loss.     (G47.33) YVONNE (obstructive sleep apnea)  Comment: Has met sleep doctor, has sleep evaluation, apparently only mild and no CPAP.    Plan  1.  Restart baby aspirin today.  2.  Restart atorvastatin.  3.  Arrange for echo.  4.  Work on weight loss.  5.  Aerobic activity.  6.  Will arrange for fasting lipids and LFTs in about 3 months.  7.  Follow-up in 1 year or sooner if needed.    The longitudinal plan of care for the diagnosis(es)/condition(s) as documented were addressed during this visit. Due to the added complexity in care, I will continue to support Cem in the subsequent management and with ongoing continuity of care.     Subjective  CC: 79-year-old white gentleman being seen in yearly follow-up.  Since I seen him he has discontinued his aspirin and discontinue his atorvastatin.  He comes in tell me some shortness of breath and heavy activity.  Tells me at rest, he will get stabbing type sensation maybe twice a month, will be under his left breast and come on and last for about 5 minutes.  No effort related angina, chest pains, effort related shortness of breath, PND, orthopnea, syncope, dizziness or significant peripheral edema.    Medications  Current Outpatient Medications   Medication Sig Dispense Refill     aspirin (ASA) 325 MG EC tablet Take 325 mg by mouth every 6 hours as needed for moderate pain       atorvastatin (LIPITOR) 80 MG tablet TAKE 1 TABLET AT BEDTIME, IF GETTING MUSCLE CRAMPS LET US KNOW 90 tablet 4     carvedilol (COREG) 12.5 MG tablet Take 1 tablet (12.5 mg) by mouth 2 times daily (with meals) 180 tablet 3     ezetimibe (ZETIA) 10  MG tablet TAKE 1 TABLET DAILY 90 tablet 0     fluticasone (FLONASE) 50 MCG/ACT nasal spray Spray 2 sprays into both nostrils daily as needed.       Multiple Vitamin (MULTI VITAMIN) TABS 1 tab(s) orally once a day       nitroGLYcerin (NITROSTAT) 0.4 MG sublingual tablet Place 1 tablet (0.4 mg) under the tongue every 5 minutes as needed 20 tablet 4     tamsulosin (FLOMAX) 0.4 MG capsule Take 0.4 mg by mouth daily       celecoxib (CELEBREX) 200 MG capsule Take 200 mg by mouth as needed (Patient not taking: Reported on 4/10/2025)       glucosam-msm-chond-hrb149-hyal (GLUCOS-CHOND-MSM, WITH ANTIOX,) 500-500-66.7 mg Tab Take 1-2 tablets by mouth daily  (Patient not taking: Reported on 4/10/2025)         Objective  /70 (BP Location: Left arm, Patient Position: Sitting, Cuff Size: Adult Large)   Pulse 78   Resp 20   Wt 129.7 kg (286 lb)   SpO2 96%   BMI 41.04 kg/m      General Appearance:    Alert, cooperative, no distress, appears stated age   Head:    Normocephalic, without obvious abnormality, atraumatic   Throat:   Lips, mucosa, and tongue normal; teeth and gums normal   Neck:   Supple, symmetrical, trachea midline, no adenopathy;        thyroid:  No enlargement/tenderness/nodules; no carotid    bruit or JVD   Back:     Symmetric, no curvature, ROM normal, no CVA tenderness   Lungs:     Clear to auscultation bilaterally, respirations unlabored   Chest wall:    No tenderness or deformity   Heart:    Regular rate and rhythm, S1 and S2 normal, 1/6 systolic ejection murmur, no rub   or gallop   Abdomen:     Soft, non-tender, bowel sounds active all four quadrants,     no masses, no organomegaly   Extremities:   Normal, atraumatic, no cyanosis or edema   Pulses:   2+ and symmetric all extremities   Skin:   Skin color, texture, turgor normal, no rashes or lesions     Results    Lab Results personally reviewed   Lab Results   Component Value Date    CHOL 167 07/18/2024    CHOL 164 05/19/2023     Lab Results  "  Component Value Date    HDL 42 07/18/2024    HDL 38 (L) 05/19/2023     No components found for: \"LDLCALC\"  Lab Results   Component Value Date    TRIG 139 07/18/2024    TRIG 247 (H) 05/19/2023     Lab Results   Component Value Date    WBC 5.6 07/27/2021    HGB 15.1 05/03/2022    HCT 47.8 07/27/2021     07/27/2021     Lab Results   Component Value Date    BUN 21.5 07/18/2024     07/18/2024    CO2 26 07/18/2024             Thank you for allowing me to participate in the care of your patient.      Sincerely,     OCTAVIO LEE MD     [facility]  cc:   Andria Lee MD  [address], [address]  [place] [address]      "

## 2025-04-11 ENCOUNTER — TELEPHONE (OUTPATIENT)
Dept: CARDIOLOGY | Facility: CLINIC | Age: 80
End: 2025-04-11
Payer: COMMERCIAL

## 2025-04-11 DIAGNOSIS — E78.00 PURE HYPERCHOLESTEROLEMIA: ICD-10-CM

## 2025-04-11 DIAGNOSIS — I25.83 CORONARY ARTERIOSCLEROSIS DUE TO LIPID RICH PLAQUE: Primary | ICD-10-CM

## 2025-04-11 NOTE — TELEPHONE ENCOUNTER
----- Message from OCTAVIO WRIGHT sent at 4/10/2025  2:01 PM CDT -----  Need a little guidance for this man.  Saw him in yearly follow-up, apparently stopped his aspirin, stopped his HCTZ, stopped his atorvastatin.  I restarted atorvastatin and asked him to restart aspirin.  Can we arrange for fasting lipids in about 3 months, at that time LFTs as well.  Once blood work done, can you please also connect with him, what meds is he taking and any side effects.  LF

## 2025-04-15 NOTE — TELEPHONE ENCOUNTER
Noted. aa  ----------------------------------------------------------------  Msg received:  From: Andria Lee MD  Sent: 4/15/2025  12:58 PM CDT  To: Carrie Mane RN    If he is taking aspirin 325, leave him on 325.  No  81.  LF

## 2025-04-15 NOTE — TELEPHONE ENCOUNTER
Dr. Lee -- pt reports that he is taking 325mg Aspirin daily. It looks like in your OV note you recommended he restart a baby Aspirin daily. Do you want him to be on 81mg daily of Aspirin or is the 325mg okay? Thanks. minoo    ----------------------------------------------------------------------  Spoke with pt regarding Dr. Lee's comments/recommendations. Pt verbalized understanding and is agreeable to FLP and LFT in 3 months. Order placed. Pt reports that he is taking the current cardiac medications:    - Atorvastatin 80mg daily  - Zetia 10mg daily  - Coreg 12.5mg BID  - Aspirin 325mg daily    Encouraged pt to reach out should he develop any side effects of these medications so we could review them. Pt agreeable. Pt transferred to scheduling to arrange FLP and LFT. minoo

## 2025-05-19 DIAGNOSIS — E78.00 PURE HYPERCHOLESTEROLEMIA: ICD-10-CM

## 2025-05-20 RX ORDER — EZETIMIBE 10 MG/1
10 TABLET ORAL DAILY
Qty: 90 TABLET | Refills: 2 | Status: SHIPPED | OUTPATIENT
Start: 2025-05-20

## 2025-07-15 ENCOUNTER — RESULTS FOLLOW-UP (OUTPATIENT)
Dept: CARDIOLOGY | Facility: CLINIC | Age: 80
End: 2025-07-15

## 2025-07-15 ENCOUNTER — LAB (OUTPATIENT)
Dept: CARDIOLOGY | Facility: CLINIC | Age: 80
End: 2025-07-15
Payer: COMMERCIAL

## 2025-07-15 DIAGNOSIS — E78.00 PURE HYPERCHOLESTEROLEMIA: ICD-10-CM

## 2025-07-15 DIAGNOSIS — I25.83 CORONARY ARTERIOSCLEROSIS DUE TO LIPID RICH PLAQUE: ICD-10-CM

## 2025-07-15 LAB
ALBUMIN SERPL BCG-MCNC: 4 G/DL (ref 3.5–5.2)
ALP SERPL-CCNC: 69 U/L (ref 40–150)
ALT SERPL W P-5'-P-CCNC: 19 U/L (ref 0–70)
AST SERPL W P-5'-P-CCNC: 20 U/L (ref 0–45)
BILIRUB DIRECT SERPL-MCNC: 0.14 MG/DL (ref 0–0.3)
BILIRUB SERPL-MCNC: 0.6 MG/DL
GGT SERPL-CCNC: 19 U/L (ref 8–61)
PROT SERPL-MCNC: 7.2 G/DL (ref 6.4–8.3)

## 2025-07-15 PROCEDURE — 36415 COLL VENOUS BLD VENIPUNCTURE: CPT

## 2025-07-15 PROCEDURE — 80076 HEPATIC FUNCTION PANEL: CPT

## 2025-07-15 PROCEDURE — 82977 ASSAY OF GGT: CPT

## 2025-07-17 ENCOUNTER — LAB (OUTPATIENT)
Dept: CARDIOLOGY | Facility: CLINIC | Age: 80
End: 2025-07-17
Payer: COMMERCIAL

## 2025-07-17 DIAGNOSIS — E78.00 PURE HYPERCHOLESTEROLEMIA: ICD-10-CM

## 2025-07-17 DIAGNOSIS — I25.83 CORONARY ARTERIOSCLEROSIS DUE TO LIPID RICH PLAQUE: ICD-10-CM

## 2025-07-17 LAB
CHOLEST SERPL-MCNC: 339 MG/DL
FASTING STATUS PATIENT QL REPORTED: YES
HDLC SERPL-MCNC: 46 MG/DL
LDLC SERPL CALC-MCNC: 249 MG/DL
NONHDLC SERPL-MCNC: 293 MG/DL
TRIGL SERPL-MCNC: 218 MG/DL

## 2025-07-17 PROCEDURE — 80061 LIPID PANEL: CPT

## 2025-07-17 PROCEDURE — 3050F LDL-C >= 130 MG/DL: CPT

## 2025-07-17 PROCEDURE — 36415 COLL VENOUS BLD VENIPUNCTURE: CPT

## 2025-08-20 ENCOUNTER — HOSPITAL ENCOUNTER (OUTPATIENT)
Dept: CARDIOLOGY | Facility: HOSPITAL | Age: 80
Discharge: HOME | End: 2025-08-20
Attending: INTERNAL MEDICINE
Payer: COMMERCIAL

## 2025-08-20 ENCOUNTER — RESULTS FOLLOW-UP (OUTPATIENT)
Dept: CARDIOLOGY | Facility: CLINIC | Age: 80
End: 2025-08-20

## 2025-08-20 DIAGNOSIS — I35.8 AORTIC VALVE SCLEROSIS: ICD-10-CM

## 2025-08-20 LAB — LVEF ECHO: NORMAL

## 2025-08-20 PROCEDURE — 93306 TTE W/DOPPLER COMPLETE: CPT

## 2025-08-20 PROCEDURE — 93306 TTE W/DOPPLER COMPLETE: CPT | Mod: 26 | Performed by: INTERNAL MEDICINE

## (undated) DEVICE — DRAPE STERI 23X17 NON STERILE 1010NSD

## (undated) DEVICE — SUTURE VICRYL+ 1 18 CT/CR  VLT VCP753D

## (undated) DEVICE — SPONGE NEURO 1/2X1/2 WECK 200100

## (undated) DEVICE — NEEDLE HYPO 18X1-1/2 SAFETY 305918

## (undated) DEVICE — PACK COLD 6 X 10 1-HOUR 0814-0610

## (undated) DEVICE — DRAPE C-ARM 60X42" 1013

## (undated) DEVICE — Device

## (undated) DEVICE — GLOVE BIOGEL PI ULTRATOUCH G SZ 8.0 42180

## (undated) DEVICE — GLOVE BIOGEL INDICATOR 7.5 LF 41675

## (undated) DEVICE — POSITIONER ARM CRADLE LAMINECTOMY DISP

## (undated) DEVICE — SOL NACL 0.9% IRRIG 1000ML BOTTLE 2F7124

## (undated) DEVICE — SUCTION MANIFOLD NEPTUNE 2 SYS 1 PORT 702-025-000

## (undated) DEVICE — ADH LIQUID MASTISOL TOPICAL VIAL 2-3ML 0523-48

## (undated) DEVICE — GLOVE SURG PI ULTRA TOUCH M SZ 7-1/2 LF

## (undated) DEVICE — DRSG STERI STRIP 1/2X4" R1547

## (undated) DEVICE — PLATE GROUNDING ADULT W/CORD 9165L

## (undated) DEVICE — SOL WATER IRRIG 1000ML BOTTLE 2F7114

## (undated) DEVICE — CUSHION INSERT LG PRONE VIEW JACKSON TABLE

## (undated) DEVICE — CUSTOM CATH LAB BASIN SET PACK 640PACK LHE SUTCNBPFCA

## (undated) DEVICE — CATH IV ANGIO INTRO 14GA X 1 3/4" 381467

## (undated) DEVICE — SYR 30ML LL W/O NDL 302832

## (undated) DEVICE — DRSG PRIMAPORE 03 1/8X6" 66000318

## (undated) DEVICE — ESU PENCIL SMOKE EVAC W/ROCKER SWITCH 0703-047-000

## (undated) DEVICE — SU MONOCRYL+ 4-0 18IN PS2 UND MCP496G

## (undated) DEVICE — WRAP LUMBAR COMPRESS COLD THERAPY 4632P

## (undated) DEVICE — TIP SUCTION FRAIZER 10FR DISP 163

## (undated) DEVICE — TOOL DISSECT MIDAS MR8 14CM MATCH HEAD 3MM MR8-14MH30

## (undated) DEVICE — SUTURE VICRYL+ 2-0 27IN CT-1 UND VCP259H

## (undated) DEVICE — CUSTOM PACK LUMBAR FUSION SNE5BLFHEA